# Patient Record
Sex: MALE
[De-identification: names, ages, dates, MRNs, and addresses within clinical notes are randomized per-mention and may not be internally consistent; named-entity substitution may affect disease eponyms.]

---

## 2019-09-06 ENCOUNTER — APPOINTMENT (OUTPATIENT)
Dept: UROLOGY | Facility: CLINIC | Age: 69
End: 2019-09-06
Payer: MEDICARE

## 2019-09-06 VITALS
WEIGHT: 180 LBS | HEIGHT: 67 IN | OXYGEN SATURATION: 97 % | HEART RATE: 67 BPM | DIASTOLIC BLOOD PRESSURE: 79 MMHG | BODY MASS INDEX: 28.25 KG/M2 | SYSTOLIC BLOOD PRESSURE: 112 MMHG

## 2019-09-06 DIAGNOSIS — Z87.891 PERSONAL HISTORY OF NICOTINE DEPENDENCE: ICD-10-CM

## 2019-09-06 DIAGNOSIS — Z86.39 PERSONAL HISTORY OF OTHER ENDOCRINE, NUTRITIONAL AND METABOLIC DISEASE: ICD-10-CM

## 2019-09-06 DIAGNOSIS — Z82.49 FAMILY HISTORY OF ISCHEMIC HEART DISEASE AND OTHER DISEASES OF THE CIRCULATORY SYSTEM: ICD-10-CM

## 2019-09-06 PROCEDURE — 51798 US URINE CAPACITY MEASURE: CPT

## 2019-09-06 PROCEDURE — 99203 OFFICE O/P NEW LOW 30 MIN: CPT | Mod: 25

## 2019-09-06 RX ORDER — TAMSULOSIN HYDROCHLORIDE 0.4 MG/1
0.4 CAPSULE ORAL
Refills: 0 | Status: ACTIVE | COMMUNITY

## 2019-09-06 RX ORDER — ATORVASTATIN CALCIUM 40 MG/1
40 TABLET, FILM COATED ORAL
Refills: 0 | Status: ACTIVE | COMMUNITY

## 2019-09-06 RX ORDER — MECLIZINE HYDROCHLORIDE 25 MG/1
25 TABLET ORAL
Refills: 0 | Status: ACTIVE | COMMUNITY

## 2019-09-06 NOTE — HISTORY OF PRESENT ILLNESS
[FreeTextEntry1] : 70 yo M presents with 1 month history of suprapubic pressure and discomfort with radiation down the penis\par Seems to occur only in the evening\par Voids every 2 hours during the day, nocturia every 2 hours\par no dysuria or gross hematuria\par weak stream and has been on tamsulosin for several years\par PSA 2 days ago was 1.9\par Drinks 50-60 ounces of water daily,1-2 cups of coffee,\par normal bowel movements\par no hematospermia or sexual dysfunction. Has been sexually active recently outside of his marriage. No protection

## 2019-09-06 NOTE — PHYSICAL EXAM
[General Appearance - Well Developed] : well developed [General Appearance - Well Nourished] : well nourished [Normal Appearance] : normal appearance [General Appearance - In No Acute Distress] : no acute distress [Well Groomed] : well groomed [Edema] : no peripheral edema [Respiration, Rhythm And Depth] : normal respiratory rhythm and effort [Exaggerated Use Of Accessory Muscles For Inspiration] : no accessory muscle use [Abdomen Soft] : soft [Abdomen Tenderness] : non-tender [Costovertebral Angle Tenderness] : no ~M costovertebral angle tenderness [Penis Abnormality] : normal uncircumcised penis [Urethral Meatus] : meatus normal [Urinary Bladder Findings] : the bladder was normal on palpation [Scrotum] : the scrotum was normal [Epididymis] : the epididymides were normal [Testes Tenderness] : no tenderness of the testes [Testes Mass (___cm)] : there were no testicular masses [Prostate Tenderness] : the prostate was not tender [No Prostate Nodules] : no prostate nodules [Prostate Size ___ gm] : prostate size [unfilled] gm [Normal Station and Gait] : the gait and station were normal for the patient's age [] : no rash [No Focal Deficits] : no focal deficits [Affect] : the affect was normal [Oriented To Time, Place, And Person] : oriented to person, place, and time [Mood] : the mood was normal [Not Anxious] : not anxious [No Palpable Adenopathy] : no palpable adenopathy

## 2019-09-06 NOTE — ASSESSMENT
[FreeTextEntry1] : 70 yo M with suprapubic discomfort, longstanding BPH\par \par - Discussed possible etiologies for LUTS. Discussed ways to manage them including behavioral modifications such as adequate hydration, controlling constipation, restricting fluids in the evening\par - UA, culture, GC testing\par - increase to 0.8mg tamsulosin

## 2019-09-06 NOTE — REVIEW OF SYSTEMS
[see HPI] : see HPI [both] : pain during and after intercourse [denies] : denies pain with orgasm [base] : pain in base of penis [Dizziness] : dizziness [Negative] : Endocrine

## 2019-09-12 LAB
APPEARANCE: CLEAR
BACTERIA UR CULT: NORMAL
BACTERIA: NEGATIVE
BILIRUBIN URINE: NEGATIVE
BLOOD URINE: NEGATIVE
C TRACH RRNA SPEC QL NAA+PROBE: NOT DETECTED
COLOR: YELLOW
GLUCOSE QUALITATIVE U: NEGATIVE
HYALINE CASTS: 0 /LPF
KETONES URINE: NEGATIVE
LEUKOCYTE ESTERASE URINE: NEGATIVE
MICROSCOPIC-UA: NORMAL
N GONORRHOEA RRNA SPEC QL NAA+PROBE: NOT DETECTED
NITRITE URINE: NEGATIVE
PH URINE: 5.5
PROTEIN URINE: NEGATIVE
RED BLOOD CELLS URINE: 3 /HPF
SOURCE AMPLIFICATION: NORMAL
SPECIFIC GRAVITY URINE: 1.02
SQUAMOUS EPITHELIAL CELLS: 0 /HPF
UROBILINOGEN URINE: NORMAL
WHITE BLOOD CELLS URINE: 1 /HPF

## 2019-09-13 ENCOUNTER — RESULT REVIEW (OUTPATIENT)
Age: 69
End: 2019-09-13

## 2019-09-17 ENCOUNTER — FORM ENCOUNTER (OUTPATIENT)
Age: 69
End: 2019-09-17

## 2019-09-18 ENCOUNTER — OUTPATIENT (OUTPATIENT)
Dept: OUTPATIENT SERVICES | Facility: HOSPITAL | Age: 69
LOS: 1 days | End: 2019-09-18
Payer: COMMERCIAL

## 2019-09-18 ENCOUNTER — APPOINTMENT (OUTPATIENT)
Dept: CT IMAGING | Facility: IMAGING CENTER | Age: 69
End: 2019-09-18
Payer: MEDICARE

## 2019-09-18 DIAGNOSIS — R31.29 OTHER MICROSCOPIC HEMATURIA: ICD-10-CM

## 2019-09-18 PROCEDURE — 74178 CT ABD&PLV WO CNTR FLWD CNTR: CPT | Mod: 26

## 2019-09-18 PROCEDURE — 82565 ASSAY OF CREATININE: CPT

## 2019-09-18 PROCEDURE — 74178 CT ABD&PLV WO CNTR FLWD CNTR: CPT

## 2019-09-30 ENCOUNTER — APPOINTMENT (OUTPATIENT)
Dept: UROLOGY | Facility: CLINIC | Age: 69
End: 2019-09-30
Payer: MEDICARE

## 2019-09-30 DIAGNOSIS — R31.29 OTHER MICROSCOPIC HEMATURIA: ICD-10-CM

## 2019-09-30 DIAGNOSIS — R39.9 UNSPECIFIED SYMPTOMS AND SIGNS INVOLVING THE GENITOURINARY SYSTEM: ICD-10-CM

## 2019-09-30 PROCEDURE — 52000 CYSTOURETHROSCOPY: CPT

## 2020-04-07 ENCOUNTER — RX RENEWAL (OUTPATIENT)
Age: 70
End: 2020-04-07

## 2020-04-07 RX ORDER — TAMSULOSIN HYDROCHLORIDE 0.4 MG/1
0.4 CAPSULE ORAL
Qty: 90 | Refills: 3 | Status: ACTIVE | COMMUNITY
Start: 2019-09-25 | End: 1900-01-01

## 2020-11-23 ENCOUNTER — APPOINTMENT (OUTPATIENT)
Dept: UROLOGY | Facility: CLINIC | Age: 70
End: 2020-11-23
Payer: MEDICARE

## 2020-11-23 VITALS
HEIGHT: 67 IN | DIASTOLIC BLOOD PRESSURE: 70 MMHG | RESPIRATION RATE: 15 BRPM | WEIGHT: 186 LBS | BODY MASS INDEX: 29.19 KG/M2 | TEMPERATURE: 98.3 F | SYSTOLIC BLOOD PRESSURE: 114 MMHG | HEART RATE: 74 BPM

## 2020-11-23 DIAGNOSIS — R97.20 ELEVATED PROSTATE, SPECIFIC ANTIGEN [PSA]: ICD-10-CM

## 2020-11-23 DIAGNOSIS — N13.8 BENIGN PROSTATIC HYPERPLASIA WITH LOWER URINARY TRACT SYMPMS: ICD-10-CM

## 2020-11-23 DIAGNOSIS — N40.1 BENIGN PROSTATIC HYPERPLASIA WITH LOWER URINARY TRACT SYMPMS: ICD-10-CM

## 2020-11-23 PROCEDURE — 99214 OFFICE O/P EST MOD 30 MIN: CPT

## 2020-11-23 NOTE — PHYSICAL EXAM
[General Appearance - Well Developed] : well developed [General Appearance - Well Nourished] : well nourished [Normal Appearance] : normal appearance [Well Groomed] : well groomed [General Appearance - In No Acute Distress] : no acute distress [Abdomen Soft] : soft [Abdomen Tenderness] : non-tender [Costovertebral Angle Tenderness] : no ~M costovertebral angle tenderness [Urethral Meatus] : meatus normal [Penis Abnormality] : normal uncircumcised penis [Urinary Bladder Findings] : the bladder was normal on palpation [Scrotum] : the scrotum was normal [Epididymis] : the epididymides were normal [Testes Tenderness] : no tenderness of the testes [Testes Mass (___cm)] : there were no testicular masses [Prostate Tenderness] : the prostate was not tender [No Prostate Nodules] : no prostate nodules [Prostate Size ___ gm] : prostate size [unfilled] gm [Edema] : no peripheral edema [] : no respiratory distress [Respiration, Rhythm And Depth] : normal respiratory rhythm and effort [Exaggerated Use Of Accessory Muscles For Inspiration] : no accessory muscle use [Oriented To Time, Place, And Person] : oriented to person, place, and time [Affect] : the affect was normal [Mood] : the mood was normal [Not Anxious] : not anxious [Normal Station and Gait] : the gait and station were normal for the patient's age [No Focal Deficits] : no focal deficits [No Palpable Adenopathy] : no palpable adenopathy

## 2020-11-29 PROBLEM — N40.1 BPH WITH OBSTRUCTION/LOWER URINARY TRACT SYMPTOMS: Status: ACTIVE | Noted: 2019-09-06

## 2020-11-29 NOTE — ASSESSMENT
[FreeTextEntry1] : 69 yo M with history of BPH, elevated PSA\par \par - Obtain recent PSA results from PCP\par - PSA\par - Continue tamsulosin\par - Discussed possible etiologies for elevated PSA including benign vs. malignancy\par - Discussed pros and cons of proceeding with a TRUS-PNB. Discussed risk and benefits including infection, hematochezia, hematuria, hematospermia as well as specificity and sensitivity of the biopsy.

## 2020-11-29 NOTE — ASSESSMENT
[FreeTextEntry1] : 71 yo M with history of BPH, elevated PSA\par \par - Obtain recent PSA results from PCP\par - PSA\par - Continue tamsulosin\par - Discussed possible etiologies for elevated PSA including benign vs. malignancy\par - Discussed pros and cons of proceeding with a TRUS-PNB. Discussed risk and benefits including infection, hematochezia, hematuria, hematospermia as well as specificity and sensitivity of the biopsy.

## 2020-11-29 NOTE — HISTORY OF PRESENT ILLNESS
[FreeTextEntry1] : 68 yo M presents with 1 month history of suprapubic pressure and discomfort with radiation down the penis\par Seems to occur only in the evening\par Voids every 2 hours during the day, nocturia every 2 hours\par no dysuria or gross hematuria\par weak stream and has been on tamsulosin for several years\par PSA 2 days ago was 1.9\par Drinks 50-60 ounces of water daily,1-2 cups of coffee,\par normal bowel movements\par no hematospermia or sexual dysfunction. Has been sexually active recently outside of his marriage. No protection\par \par 11/23/20 Interval history: Doing well since last visit\par Voiding every 2 hours, nocturia every 2 hours\par Drinks 3-4 L of water, 1-2 cups of coffee daily\par Pt states that he was told to follow-up for elevated PSA

## 2020-12-02 LAB — PSA SERPL-MCNC: 3.09 NG/ML

## 2022-04-27 ENCOUNTER — APPOINTMENT (OUTPATIENT)
Dept: ORTHOPEDIC SURGERY | Facility: CLINIC | Age: 72
End: 2022-04-27
Payer: MEDICARE

## 2022-04-27 VITALS — HEIGHT: 68 IN | BODY MASS INDEX: 28.34 KG/M2 | WEIGHT: 187 LBS

## 2022-04-27 DIAGNOSIS — Z96.651 PRESENCE OF RIGHT ARTIFICIAL KNEE JOINT: ICD-10-CM

## 2022-04-27 DIAGNOSIS — Z96.652 PRESENCE OF LEFT ARTIFICIAL KNEE JOINT: ICD-10-CM

## 2022-04-27 PROCEDURE — 73560 X-RAY EXAM OF KNEE 1 OR 2: CPT | Mod: LT

## 2022-04-27 PROCEDURE — 99203 OFFICE O/P NEW LOW 30 MIN: CPT

## 2022-04-27 NOTE — HISTORY OF PRESENT ILLNESS
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  Reports mild residual anterior knee numbness denies pain swelling locking giving out.  Walking independently and does play soccer

## 2022-04-27 NOTE — CONSULT LETTER
[Dear  ___] : Dear  [unfilled], [Consult Letter:] : I had the pleasure of evaluating your patient, [unfilled]. [Consult Closing:] : Thank you very much for allowing me to participate in the care of this patient.  If you have any questions, please do not hesitate to contact me. [Sincerely,] : Sincerely, [FreeTextEntry3] : Raudel Pérez MD, FAAOS\par Total Hip and Total Knee Replacement\par Anterior Total Hip Replacement\par \par \par Orthopaedic Surgery\par [facility]\par

## 2022-04-27 NOTE — PHYSICAL EXAM
[de-identified] : Constitutional:Well nourished , well developed and in no acute distress\par Psychiatric: Alert and oriented to time place and person.Appropriate affect \par Skin:Head, neck, arms and lower extremities:no lesions or discoloration\par HEENT: Normocephalic, EOM intact, Nasal septum midline,\par Respiratory: Unlabored respirations,no audible wheezing ,no tachypnea, no cyanosis\par Cardiovascular: no leg swelling  no ankle edema no JVD, pulse regular\par Vascular: no calf or thigh tenderness, \par Peripheral pulses; intact\par Lymphatics:No groin adenopathy,no lymphedema lower  or upper extremities\par Left knee healed normal alignment flexion 0/120 ligaments intact\par Right knee healed neutral alignment no effusion flexion 0/115 [de-identified] : X-rays right and left knee 3 views demonstrate satisfactory component alignment right and left total knee replacement

## 2023-04-14 ENCOUNTER — APPOINTMENT (OUTPATIENT)
Dept: UROLOGY | Facility: CLINIC | Age: 73
End: 2023-04-14

## 2023-05-03 ENCOUNTER — APPOINTMENT (OUTPATIENT)
Dept: ORTHOPEDIC SURGERY | Facility: CLINIC | Age: 73
End: 2023-05-03

## 2023-05-04 ENCOUNTER — APPOINTMENT (OUTPATIENT)
Dept: UROLOGY | Facility: CLINIC | Age: 73
End: 2023-05-04

## 2024-05-23 NOTE — HISTORY OF PRESENT ILLNESS
I reviewed the resident/fellow's documentation and discussed the patient with the resident/fellow. I agree with the resident/fellow's medical decision making as documented in the note.    Priyank Barreto MD       [FreeTextEntry1] : 70 yo M presents with 1 month history of suprapubic pressure and discomfort with radiation down the penis\par Seems to occur only in the evening\par Voids every 2 hours during the day, nocturia every 2 hours\par no dysuria or gross hematuria\par weak stream and has been on tamsulosin for several years\par PSA 2 days ago was 1.9\par Drinks 50-60 ounces of water daily,1-2 cups of coffee,\par normal bowel movements\par no hematospermia or sexual dysfunction. Has been sexually active recently outside of his marriage. No protection\par \par 11/23/20 Interval history: Doing well since last visit\par Voiding every 2 hours, nocturia every 2 hours\par Drinks 3-4 L of water, 1-2 cups of coffee daily\par Pt states that he was told to follow-up for elevated PSA

## 2024-06-21 ENCOUNTER — APPOINTMENT (OUTPATIENT)
Dept: OTOLARYNGOLOGY | Facility: CLINIC | Age: 74
End: 2024-06-21

## 2024-08-30 ENCOUNTER — NON-APPOINTMENT (OUTPATIENT)
Age: 74
End: 2024-08-30

## 2024-09-04 ENCOUNTER — RESULT REVIEW (OUTPATIENT)
Age: 74
End: 2024-09-04

## 2024-09-04 ENCOUNTER — APPOINTMENT (OUTPATIENT)
Dept: OTOLARYNGOLOGY | Facility: CLINIC | Age: 74
End: 2024-09-04
Payer: MEDICARE

## 2024-09-04 VITALS
HEIGHT: 68 IN | BODY MASS INDEX: 27.58 KG/M2 | TEMPERATURE: 97.4 F | HEART RATE: 77 BPM | DIASTOLIC BLOOD PRESSURE: 71 MMHG | OXYGEN SATURATION: 98 % | SYSTOLIC BLOOD PRESSURE: 106 MMHG | WEIGHT: 182 LBS

## 2024-09-04 DIAGNOSIS — C11.9 MALIGNANT NEOPLASM OF NASOPHARYNX, UNSPECIFIED: ICD-10-CM

## 2024-09-04 DIAGNOSIS — C77.0 SECONDARY AND UNSPECIFIED MALIGNANT NEOPLASM OF LYMPH NODES OF HEAD, FACE AND NECK: ICD-10-CM

## 2024-09-04 PROCEDURE — 99204 OFFICE O/P NEW MOD 45 MIN: CPT | Mod: 25

## 2024-09-04 PROCEDURE — 31231 NASAL ENDOSCOPY DX: CPT

## 2024-09-04 NOTE — CONSULT LETTER
[Dear  ___] : Dear  [unfilled], [Consult Letter:] : I had the pleasure of evaluating your patient, [unfilled]. [Please see my note below.] : Please see my note below. [Consult Closing:] : Thank you very much for allowing me to participate in the care of this patient.  If you have any questions, please do not hesitate to contact me. [Sincerely,] : Sincerely, [FreeTextEntry2] : STEFAN Tolliver ( [place], [place]) Adebayo Smith MD ([place], [place]) [FreeTextEntry3] : Sergio Romo MD, FACS     [facility] Associate Chair    Department of Otolaryngology  Professor Otolaryngology & Molecular Medicine [facility]

## 2024-09-04 NOTE — PROCEDURE
[Mass] : a mass [None] : none [Flexible Endoscope] : examined with the flexible endoscope [FreeTextEntry6] : there is an ulcerated and indutaed mass epicentered midline uipper NP w extension to R fossae Rosenmueller. There is no OP entension but is ? some extensikon to post aspect R inf turbinate

## 2024-09-04 NOTE — PHYSICAL EXAM
[de-identified] : AD retracted w fluid.  As generally clear [FreeTextEntry1] : there is an ulcerated and indutaed mass epicentered midline uipper NP w extension to R fossae Rosenmueller. There is no OP entension but is ? some extensikon to post aspect R inf turbinate [Midline] : trachea located in midline position [Normal] : no rashes

## 2024-09-05 ENCOUNTER — NON-APPOINTMENT (OUTPATIENT)
Age: 74
End: 2024-09-05

## 2024-09-10 ENCOUNTER — OUTPATIENT (OUTPATIENT)
Dept: OUTPATIENT SERVICES | Facility: HOSPITAL | Age: 74
LOS: 1 days | End: 2024-09-10
Payer: COMMERCIAL

## 2024-09-10 ENCOUNTER — APPOINTMENT (OUTPATIENT)
Dept: ULTRASOUND IMAGING | Facility: IMAGING CENTER | Age: 74
End: 2024-09-10
Payer: MEDICARE

## 2024-09-10 ENCOUNTER — RESULT REVIEW (OUTPATIENT)
Age: 74
End: 2024-09-10

## 2024-09-10 DIAGNOSIS — C11.9 MALIGNANT NEOPLASM OF NASOPHARYNX, UNSPECIFIED: ICD-10-CM

## 2024-09-10 DIAGNOSIS — C77.0 SECONDARY AND UNSPECIFIED MALIGNANT NEOPLASM OF LYMPH NODES OF HEAD, FACE AND NECK: ICD-10-CM

## 2024-09-10 PROCEDURE — 88364 INSITU HYBRIDIZATION (FISH): CPT | Mod: 26

## 2024-09-10 PROCEDURE — 38505 NEEDLE BIOPSY LYMPH NODES: CPT | Mod: RT

## 2024-09-10 PROCEDURE — 88360 TUMOR IMMUNOHISTOCHEM/MANUAL: CPT

## 2024-09-10 PROCEDURE — 76942 ECHO GUIDE FOR BIOPSY: CPT | Mod: 26

## 2024-09-10 PROCEDURE — 38505 NEEDLE BIOPSY LYMPH NODES: CPT

## 2024-09-10 PROCEDURE — 76942 ECHO GUIDE FOR BIOPSY: CPT

## 2024-09-10 PROCEDURE — 88342 IMHCHEM/IMCYTCHM 1ST ANTB: CPT | Mod: 26,59

## 2024-09-10 PROCEDURE — 88364 INSITU HYBRIDIZATION (FISH): CPT

## 2024-09-10 PROCEDURE — 88305 TISSUE EXAM BY PATHOLOGIST: CPT

## 2024-09-10 PROCEDURE — 88173 CYTOPATH EVAL FNA REPORT: CPT | Mod: 26

## 2024-09-10 PROCEDURE — 88341 IMHCHEM/IMCYTCHM EA ADD ANTB: CPT | Mod: 26,59

## 2024-09-10 PROCEDURE — 88305 TISSUE EXAM BY PATHOLOGIST: CPT | Mod: 26

## 2024-09-10 PROCEDURE — 88365 INSITU HYBRIDIZATION (FISH): CPT | Mod: 26,59

## 2024-09-10 PROCEDURE — 88342 IMHCHEM/IMCYTCHM 1ST ANTB: CPT

## 2024-09-10 PROCEDURE — 88365 INSITU HYBRIDIZATION (FISH): CPT

## 2024-09-10 PROCEDURE — 88173 CYTOPATH EVAL FNA REPORT: CPT

## 2024-09-10 PROCEDURE — 88360 TUMOR IMMUNOHISTOCHEM/MANUAL: CPT | Mod: 26

## 2024-09-10 PROCEDURE — 88341 IMHCHEM/IMCYTCHM EA ADD ANTB: CPT

## 2024-09-11 ENCOUNTER — OUTPATIENT (OUTPATIENT)
Dept: OUTPATIENT SERVICES | Facility: HOSPITAL | Age: 74
LOS: 1 days | End: 2024-09-11
Payer: COMMERCIAL

## 2024-09-11 ENCOUNTER — APPOINTMENT (OUTPATIENT)
Dept: NUCLEAR MEDICINE | Facility: IMAGING CENTER | Age: 74
End: 2024-09-11

## 2024-09-11 ENCOUNTER — OUTPATIENT (OUTPATIENT)
Dept: OUTPATIENT SERVICES | Facility: HOSPITAL | Age: 74
LOS: 1 days | End: 2024-09-11

## 2024-09-11 VITALS
HEART RATE: 71 BPM | HEIGHT: 66 IN | WEIGHT: 179.9 LBS | RESPIRATION RATE: 15 BRPM | TEMPERATURE: 98 F | OXYGEN SATURATION: 99 % | DIASTOLIC BLOOD PRESSURE: 82 MMHG | SYSTOLIC BLOOD PRESSURE: 121 MMHG

## 2024-09-11 DIAGNOSIS — J39.2 OTHER DISEASES OF PHARYNX: ICD-10-CM

## 2024-09-11 DIAGNOSIS — C11.9 MALIGNANT NEOPLASM OF NASOPHARYNX, UNSPECIFIED: ICD-10-CM

## 2024-09-11 DIAGNOSIS — Z96.652 PRESENCE OF LEFT ARTIFICIAL KNEE JOINT: Chronic | ICD-10-CM

## 2024-09-11 DIAGNOSIS — Z98.49 CATARACT EXTRACTION STATUS, UNSPECIFIED EYE: Chronic | ICD-10-CM

## 2024-09-11 DIAGNOSIS — Z96.651 PRESENCE OF RIGHT ARTIFICIAL KNEE JOINT: Chronic | ICD-10-CM

## 2024-09-11 DIAGNOSIS — Z98.890 OTHER SPECIFIED POSTPROCEDURAL STATES: Chronic | ICD-10-CM

## 2024-09-11 DIAGNOSIS — Z91.89 OTHER SPECIFIED PERSONAL RISK FACTORS, NOT ELSEWHERE CLASSIFIED: ICD-10-CM

## 2024-09-11 PROCEDURE — 78815 PET IMAGE W/CT SKULL-THIGH: CPT

## 2024-09-11 PROCEDURE — A9552: CPT

## 2024-09-11 PROCEDURE — 78815 PET IMAGE W/CT SKULL-THIGH: CPT | Mod: 26,PI

## 2024-09-11 NOTE — H&P PST ADULT - TEMPERATURE IN CELSIUS (DEGREES C)
Pt discharged in stable condition, discharge instructions and prescriptions given, pt verbalized understanding. Surgical site intact.pt waiting on transport. Montserrat Guidry RN     36.7

## 2024-09-11 NOTE — H&P PST ADULT - ENMT COMMENTS
FROM of neck pre op dx-  right nasopharyngeal mass and right cervical adenopathy reports of right otalgia, tinnitus, for last 2 months, right sided neck + lump for last 2 weeks-followed up with ENT- s/p cervical  node biopsy, plan for nasopharynx  biopsy

## 2024-09-11 NOTE — H&P PST ADULT - PRO ARRIVE FROM
As we spoke continue to take ibuprofen if you are headache comes back, follow-up with your primary care doctor have given you Dr. Odell Norris name and number if you do not have 1.   Return to the emergency department if you have any questions or concerns home

## 2024-09-11 NOTE — H&P PST ADULT - SPO2 (%)
99 Flap Thinning Complex Repair Preamble Text (Leave Blank If You Do Not Want): An incision was made along the previous flap suture line. Undermining was performed beneath the flap and redundant tissue was removed to restore the normal contour of the skin.

## 2024-09-11 NOTE — H&P PST ADULT - NEUROLOGICAL COMMENTS
chronic LE intermittent paresthesias for - 5 years, history of vertigo- followed with PCP was on meclizine intermittently

## 2024-09-11 NOTE — H&P PST ADULT - NECK
right lateral neck with swollen cervical lymph node/normal/supple/symmetric/no tracheal deviation/no thyromegaly/no palpable masses

## 2024-09-11 NOTE — H&P PST ADULT - PSY GEN HX ROS MEA POS PC
Initial Clinical Review    Admission: Date/Time/Statement:   Admission Orders (From admission, onward)       Ordered        04/01/24 0323  Inpatient Admission  Once                          Orders Placed This Encounter   Procedures    Inpatient Admission     Standing Status:   Standing     Number of Occurrences:   1     Order Specific Question:   Level of Care     Answer:   Med Surg [16]     Order Specific Question:   Estimated length of stay     Answer:   More than 2 Midnights     Order Specific Question:   Certification     Answer:   I certify that inpatient services are medically necessary for this patient for a duration of greater than two midnights. See H&P and MD Progress Notes for additional information about the patient's course of treatment.     ED Arrival Information       Expected   -    Arrival   3/31/2024 21:08    Acuity   Urgent              Means of arrival   Ambulance    Escorted by   [facility]    Service   Hospitalist    Admission type   Trauma Center              Arrival complaint   Fall             Chief Complaint   Patient presents with    Fall     See trauma charting       Initial Presentation: 76 y.o. male Admitted Inpatient with Acute Encephalopathy s/p Unwitnessed Fall.  Pt presented to ED by ems from IP rehab facility s/p unwitnessed fall with reported head strike. Pt is aware he fell but does not recall events. Trauma w/u in ED neg for acute injuries. WBC 15.41. On exam small lac to L forehead, confused and repetitive, frequently asking where he is and where family is.  UA with innumerable RBCs/WBC without bacteria, and has completing treatment for acute cystitis.   PMHx: T2 DM, MS, HTN, HONEY on CPAP, aneurysm of basilar artery  Plan: Admit IP d/t confusion. -- Obtain routine EEG, TSH/B12/folate. Monitor mental status closely. Neuro checks q4h. Further w/u pending these results particularly if pt's mental status should not improve. Observe off further abx currently. PT/OT evals.  Continue pt po meds.       Date: 4/2   Day 2:   Wound care note: Pressure injury right buttock, stage - Wound is oval in shape, dry and intact skin, 100% non-blanchable red intact skin. Zee-wound is dry, intact, blanchable. B/L elbows, hips, sacrum, left buttock and heels- skin is dry, intact, blanchable.   Plan: Hydraguard to b/l sacrum, buttock and heels BID and PRN. Elevate heels to offload pressure. Ehob cushion in chair when out of bed. Moisturize skin daily with skin nourishing cream.    OOB in chair. Neck pain is improved today. Acute encephalopathy as resolved. AA&Ox3. WBCs now wnl. Agreeable to return to rehab. EEG abnormal however nonspecific. No epileptic discharges. Continue supportive care for musculoskeletal neck pain. Outpatient follow-up with PCP.           ED Triage Vitals   Temperature Pulse Respirations Blood Pressure SpO2   03/31/24 2111 03/31/24 2111 03/31/24 2111 03/31/24 2111 03/31/24 2111   98.3 °F (36.8 °C) 89 18 169/80 94 %      Temp Source Heart Rate Source Patient Position - Orthostatic VS BP Location FiO2 (%)   03/31/24 2111 03/31/24 2111 03/31/24 2111 04/01/24 0609 --   Oral Monitor Lying Right arm       Pain Score       04/01/24 0900       No Pain          Wt Readings from Last 1 Encounters:   03/31/24 69.3 kg (152 lb 12.5 oz)     Additional Vital Signs:   Date/Time Temp Pulse Resp BP MAP (mmHg) SpO2 O2 Device Patient Position - Orthostatic VS   04/02/24 0730 -- -- -- -- -- -- None (Room air) --   04/02/24 07:21:44 98 °F (36.7 °C) 67 17 140/69 93 95 % None (Room air) Lying   04/01/24 2211 -- 68 -- -- -- -- -- --   04/01/24 2158 -- -- -- 130/62 -- -- -- --   04/01/24 21:56:53 98 °F (36.7 °C) -- 18 100/41 Abnormal  61 Abnormal  -- -- --   04/01/24 2012 -- -- -- -- -- -- None (Room air) --   04/01/24 16:07:46 98.2 °F (36.8 °C) -- -- 147/68 94 -- -- --   04/01/24 14:41:59 98.6 °F (37 °C) 83 16 126/54 78 96 % None (Room air) Lying   04/01/24 0915 -- 76 -- -- -- -- -- --   04/01/24 0900 --  -- -- -- -- -- None (Room air) --   04/01/24 0820 97.6 °F (36.4 °C) -- 16 137/61 86 -- -- --   04/01/24 0700 -- 60 12 156/65 93 96 % None (Room air) Lying   04/01/24 0609 -- 66 15 144/64 92 96 % None (Room air) Lying   04/01/24 0300 -- 74 16 176/95 Abnormal  -- 97 % None (Room air) --   04/01/24 0200 -- 66 12 150/62 -- 98 % None (Room air) Lying   04/01/24 0100 -- 76 15 145/68 -- 98 % None (Room air) Lying   03/31/24 2300 -- 82 20 158/74 -- 99 % None (Room air) Lying   03/31/24 2230 -- 80 19 174/77 Abnormal  -- 97 % None (Room air) Lying   03/31/24 2200 -- 78 17 168/74 -- 98 % None (Room air) Lying   03/31/24 2145 -- 82 21 164/74 -- 99 % None (Room air) Lying   03/31/24 2129 -- 87 20 158/125 Abnormal  -- 97 % None (Room air) --   03/31/24 21:15:22 -- 88 19 156/68 -- 97 % None (Room air) Lying   03/31/24 21:11:26 98.3 °F (36.8 °C) 89 18 169/80 -- 94 % None (Room air) Lying     Pertinent Labs/Diagnostic Test Results:   EEG 4/1:   Interpretation:   This is an abnormal 28 minutes awake and drowsy EEG due to excessive theta and delta activity during wakefulness and generalized intermittent rhythmic delta activity (GRDA).   These findings are etiologically nonspecific for moderate diffuse cerebral dysfunction.    MRI cervical spine wo contrast   Final Result by Rasheed Lassiter MD (04/01 0848)      Severely motion degraded, incomplete MR of the cervical spine, verging on nondiagnostic.         XR Trauma multiple (B/RA trauma bay ONLY)   Final Result by Branden Dale DO (04/01 0040)      No acute cardiopulmonary disease within limitations of supine imaging.      Other findings as above.      Correlation with pending trauma CTs recommended.         XR chest 1 view   Final Result by Branden Dale DO (04/01 1047)      No acute cardiopulmonary disease within limitations of supine imaging.      Other findings as above.      Correlation with pending trauma CTs recommended.         XR pelvis ap  only 1 or 2 vw   Final Result by Branden Dale DO (04/01 1047)      No acute cardiopulmonary disease within limitations of supine imaging.      Other findings as above.      Correlation with pending trauma CTs recommended.         CT head without contrast   Final Result by Wil Phillips MD (03/31 2232)      No acute intracranial hemorrhage or depressed calvarial fracture. Senescent changes and other ancillary findings detailed above.         CT spine cervical without contrast   Final Result by Wil Phillips MD (03/31 2238)      Multilevel degenerative changes without acute cervical spine fracture or traumatic malalignment.            Results from last 7 days   Lab Units 04/02/24  0454 04/01/24  0458 03/31/24 2120 03/31/24 2119   WBC Thousand/uL 11.15* 13.50* 15.41*  --    HEMOGLOBIN g/dL 13.3 14.8 14.6  --    I STAT HEMOGLOBIN g/dl  --   --   --  15.0   HEMATOCRIT % 41.7 45.4 44.7  --    HEMATOCRIT, ISTAT %  --   --   --  44   PLATELETS Thousands/uL 416* 394* 444*  --    NEUTROS ABS Thousands/µL 4.87  --  9.70*  --      Results from last 7 days   Lab Units 04/02/24  0454 04/01/24  0609 03/31/24 2305 03/31/24 2119   SODIUM mmol/L 140 139 136  --    POTASSIUM mmol/L 3.8 4.4 5.3  --    CHLORIDE mmol/L 104 102 102  --    CO2 mmol/L 24 27 27  --    CO2, I-STAT mmol/L  --   --   --  31   ANION GAP mmol/L 12 10 7  --    BUN mg/dL 17 12 13  --    CREATININE mg/dL 0.88 0.77 0.77  --    EGFR ml/min/1.73sq m 84 89 89  --    CALCIUM mg/dL 9.3 9.6 9.9  --    CALCIUM, IONIZED, ISTAT mmol/L  --   --   --  1.17     Results from last 7 days   Lab Units 03/31/24  2305   AST U/L 25   ALT U/L 23   ALK PHOS U/L 82   TOTAL PROTEIN g/dL 7.5   ALBUMIN g/dL 3.6   TOTAL BILIRUBIN mg/dL 0.44     Results from last 7 days   Lab Units 04/02/24  1058 04/02/24  0724 04/01/24  2051 04/01/24  1710 04/01/24  1240   POC GLUCOSE mg/dl 131 112 108 178* 115     Results from last 7 days   Lab Units 04/02/24  0454 04/01/24  0609  03/31/24  2305   GLUCOSE RANDOM mg/dL 112 111 122     Results from last 7 days   Lab Units 03/31/24  2119   PH, KENIA I-STAT  7.432*   PCO2, KENIA ISTAT mm HG 44.1   PO2, KENIA ISTAT mm HG 19.0*   HCO3, KENIA ISTAT mmol/L 29.4   I STAT BASE EXC mmol/L 4*   I STAT O2 SAT % 30*     Results from last 7 days   Lab Units 04/01/24  0458   TSH 3RD GENERATON uIU/mL 2.475     Results from last 7 days   Lab Units 04/01/24  0059   CLARITY UA  Extra Turbid   COLOR UA  Yellow   SPEC GRAV UA  1.010   PH UA  6.5   GLUCOSE UA mg/dl 1000 (1%)*   KETONES UA mg/dl Negative   BLOOD UA  Small*   PROTEIN UA mg/dl 30 (1+)*   NITRITE UA  Negative   BILIRUBIN UA  Negative   UROBILINOGEN UA (BE) mg/dl <2.0   LEUKOCYTES UA  Large*   WBC UA /hpf Innumerable*   RBC UA /hpf Innumerable*   BACTERIA UA /hpf None Seen   EPITHELIAL CELLS WET PREP /hpf None Seen   MUCUS THREADS  Occasional*       ED Treatment:   Medication Administration from 03/31/2024 2105 to 04/01/2024 0753         Date/Time Order Dose Route Action     03/31/2024 2137 EDT tetanus-diphtheria-acellular pertussis (BOOSTRIX) IM injection 0.5 mL 0.5 mL Intramuscular Given     03/31/2024 2337 EDT acetaminophen (Ofirmev) injection 1,000 mg 1,000 mg Intravenous New Bag       Past Medical History:   Diagnosis Date    Diabetes (HCC)     Hypertension     Multiple sclerosis (HCC)      Present on Admission:   Acute encephalopathy   Leukocytosis   HONEY on CPAP   Fall   Primary hypertension   Type 2 diabetes mellitus without complication, without long-term current use of insulin (HCC)   Aneurysm of basilar artery (HCC)   Multiple sclerosis (HCC)   Urinary retention      Admitting Diagnosis: Unspecified multiple injuries, initial encounter [T07.XXXA]  Age/Sex: 76 y.o. male  Admission Orders:  Scheduled Medications:  acetaminophen, 975 mg, Oral, Q8H LEE  amLODIPine, 10 mg, Oral, Daily   And  atorvastatin, 80 mg, Oral, Daily  clopidogrel, 75 mg, Oral, Daily  vitamin B-12, 500 mcg, Oral, Daily  enoxaparin,  40 mg, Subcutaneous, Daily  gabapentin, 300 mg, Oral, BID  gabapentin, 600 mg, Oral, HS  insulin lispro, 1-5 Units, Subcutaneous, TID AC  insulin lispro, 1-5 Units, Subcutaneous, HS  latanoprost, 1 drop, Both Eyes, HS  lidocaine, 1 patch, Topical, Daily  pantoprazole, 40 mg, Oral, BID  propranolol, 60 mg, Oral, Daily  sertraline, 25 mg, Oral, Daily    PRN Meds:  bisacodyl, 10 mg, Rectal, Daily PRN  ondansetron, 4 mg, Intravenous, Q6H PRN        IP CONSULT TO GERONTOLOGY    Network Utilization Review Department  ATTENTION: Please call with any questions or concerns to [phone] and carefully listen to the prompts so that you are directed to the right person. All voicemails are confidential.   For Discharge needs, contact Care Management DC Support Team at [phone] opt. [phone]  Send all requests for admission clinical reviews, approved or denied determinations and any other requests to dedicated fax number below belonging to the [place] where the patient is receiving treatment. List of dedicated fax numbers for the Facilities:  FACILITY NAME UR FAX NUMBER   ADMISSION DENIALS (Administrative/Medical Necessity) [phone]   DISCHARGE SUPPORT TEAM ([facility]) [phone]   PARENT CHILD HEALTH (Maternity/NICU/Pediatrics) [phone]   [facility] [phone]   [facility] [phone]   [facility] [phone]   [facility] [phone]   [facility] [phone]   [facility] [phone]   [facility] [phone]   [facility] [phone]   [facility] [phone]   [facility] [phone]   [facility] [phone]   [facility] [phone]          anxiety/insomnia

## 2024-09-11 NOTE — H&P PST ADULT - LYMPHATICS COMMENTS
right lateral cervical lymph node enlargement- s/p biopsy with mild ecchymotic area on right lateral neck

## 2024-09-11 NOTE — H&P PST ADULT - NSICDXFAMILYHX_GEN_ALL_CORE_FT
FAMILY HISTORY:  Aunt  Still living? Unknown  Family hx of colon cancer, Age at diagnosis: Age Unknown

## 2024-09-11 NOTE — H&P PST ADULT - NSICDXPASTSURGICALHX_GEN_ALL_CORE_FT
PAST SURGICAL HISTORY:  H/O cervical biopsy     H/O colonoscopy     History of knee replacement procedure of right knee     S/P cataract surgery     S/P total knee replacement, left

## 2024-09-11 NOTE — H&P PST ADULT - NSANTHOSAYNRD_GEN_A_CORE
No. GARCIA screening performed.  STOP BANG Legend: 0-2 = LOW Risk; 3-4 = INTERMEDIATE Risk; 5-8 = HIGH Risk

## 2024-09-11 NOTE — H&P PST ADULT - PROBLEM SELECTOR PLAN 3
Postoperative Delirium Screen    Patient eligible for flaco risk screen age>75? (if <= 75 then done) NO    Health care proxy paperwork given to patient? Yes (all patients should be given the packet to fill out at home and return on day of surgery to pre-op RN)    Impaired mobility (ie: uses cane, walker, wheelchair, or assist device)? Yes/no    Known dementia diagnosis? Yes/no    Impaired functional status (METS<4)? Yes/no    Malnutrition BMI<20? Yes/no

## 2024-09-11 NOTE — H&P PST ADULT - HISTORY OF PRESENT ILLNESS
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 About 2 months ago pt  was experiencing right ear ringing and feeling the ear clogged, antibiotics and and a nasal spray given with no improvement. s/p followed up with otolaryngologist given medication, steroids and sprays and a ct scan was ordered. Ct scan showed nasopharyngeal mass. Pt also developed lump on the  right side of neck about 2 weeks. Symptoms still the same and now he is experiencing right side otalgia and his voice sounds nasally. s/p right dised cervical lymph node biopsy -09/10/24, plan for nasopharynx biopsy. 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 Pt  was experiencing right ear ringing and feeling the ear clogged for last 2 months, s/p antibiotics  and  nasal spray given with no improvement. Pt followed up with otolaryngologist given medication, steroids and sprays and a ct scan was ordered. Ct scan showed nasopharyngeal mass. Also developed lump on the  right side of neck about 2 weeks ago. s/p right sided cervical lymph node biopsy -09/10/24, plan for nasopharynx biopsy.

## 2024-09-11 NOTE — H&P PST ADULT - PROBLEM SELECTOR PLAN 1
Patient is tentatively scheduled  for nasopharynx biopsy, nasal  endoscopy with biopsy with Dr Romo- 09/17/24.    Pre-op instructions provided. Pt given verbal and written instructions with teach back on pt own omeprazole. Pt verbalized understanding with return demonstration.    No labs warranted for minimally risk procedure

## 2024-09-11 NOTE — H&P PST ADULT - ADDITIONAL PE
denies loose teeth, patient  has upper complete dentures  visualization of only the base of uvula- mallampati class 3

## 2024-09-11 NOTE — H&P PST ADULT - BLOOD TRANSFUSION, PREVIOUS, PROFILE
Have Your Skin Lesions Been Treated?: not been treated Is This A New Presentation, Or A Follow-Up?: Skin Lesions How Severe Is Your Skin Lesion?: moderate no

## 2024-09-11 NOTE — H&P PST ADULT - PROBLEM/PLAN-3
"                  Clinical Nutrition     Patient Name: Esteban Winters  YOB: 2018  MRN: [id number]  Date of Encounter: 20 14:07  Admission date: 2018    Reason for Visit: Education on discharge feeding plan    Patient/Client History:   Hospital Problem List     , gestational age 34 completed weeks    RDS (respiratory distress syndrome in the )    LGA (large for gestational age) infant    Anthropometrics:  Birth Length: (inches)  Current Length: 20.5  Height: 49.2 cm (19.37\")      Birth OFC:   Current OFC: Head Circumference: 13.58\" (34.5 cm)  Head Circumference: 13.39\" (34 cm)      Birth Weight:                                              3100 g (6 lb 13.4 oz)  Current Weight: Weight: 3112 g (6 lb 13.8 oz)   Weight change from Birth Weight: 0%         Food and Nutrition-Related History:     Discharge diet: EBM fortified with HMF 1:25; Similac Sensitive mixed to 22 kcal/oz if no EBM    Education Assessment:    Education provided to: Mother and Father  Readiness to learn: Acceptance and Eager  Barriers to learning: No barriers identified at this time  Method of Education: Written material and Verbal instruction  Level of Understanding: Knowledge or skill consistently and independently        Met with parents to discuss discharge feeding plan.  Mom states she has been pumping but does not get as much when pumping at night. She states when she visits infant in NICU she is able to pump more during those times.  Advised mom her milk supply may increase once infant is home and around her more frequently in a less stressful environment.   We went over mixing HMF with her expressed milk at a 1:25 ratio.  Discussed use of Sim Sensitive as needed and how to mix to 22 kcal/oz RD provided HMF and formula, faxed form to have additional HMF shipped to parents address. Food safety reviewed.    Nutrition Diagnosis        Problem Food and nutrition knowledge deficit   Etiology " Discharge feeding plan   Signs/Symptoms Instruction on mixing EBM with HMF and formula use required       Intervention/Recommendations      Provided written and verbal instructions, Provided written and verbal instructions, mixing/measurement equipment  and Provided formula samples     Monitor: RD contact information provided to family and available to assist as needed.      Maria Del Carmen Parada RD,   Time Spent: 30 min         DISPLAY PLAN FREE TEXT

## 2024-09-13 ENCOUNTER — APPOINTMENT (OUTPATIENT)
Dept: OTOLARYNGOLOGY | Facility: CLINIC | Age: 74
End: 2024-09-13

## 2024-09-13 PROBLEM — M19.90 UNSPECIFIED OSTEOARTHRITIS, UNSPECIFIED SITE: Chronic | Status: ACTIVE | Noted: 2024-09-11

## 2024-09-13 PROBLEM — F41.9 ANXIETY DISORDER, UNSPECIFIED: Chronic | Status: ACTIVE | Noted: 2024-09-11

## 2024-09-13 PROBLEM — N40.0 BENIGN PROSTATIC HYPERPLASIA WITHOUT LOWER URINARY TRACT SYMPTOMS: Chronic | Status: ACTIVE | Noted: 2024-09-11

## 2024-09-15 ENCOUNTER — OUTPATIENT (OUTPATIENT)
Dept: OUTPATIENT SERVICES | Facility: HOSPITAL | Age: 74
LOS: 1 days | Discharge: HOME | End: 2024-09-15

## 2024-09-15 DIAGNOSIS — Z98.890 OTHER SPECIFIED POSTPROCEDURAL STATES: Chronic | ICD-10-CM

## 2024-09-15 DIAGNOSIS — Z96.652 PRESENCE OF LEFT ARTIFICIAL KNEE JOINT: Chronic | ICD-10-CM

## 2024-09-15 DIAGNOSIS — C11.9 MALIGNANT NEOPLASM OF NASOPHARYNX, UNSPECIFIED: ICD-10-CM

## 2024-09-15 DIAGNOSIS — Z96.651 PRESENCE OF RIGHT ARTIFICIAL KNEE JOINT: Chronic | ICD-10-CM

## 2024-09-15 DIAGNOSIS — Z98.49 CATARACT EXTRACTION STATUS, UNSPECIFIED EYE: Chronic | ICD-10-CM

## 2024-09-16 ENCOUNTER — TRANSCRIPTION ENCOUNTER (OUTPATIENT)
Age: 74
End: 2024-09-16

## 2024-09-16 NOTE — ASU PATIENT PROFILE, ADULT - BLOOD TRANSFUSION, PREVIOUS, PROFILE
Video Visit - Kem Byrd 34 y o  female MRN: [id number]    REQUIRED DOCUMENTATION:         1  This service was provided via [facility]  2  Provider located at [address] [place] [id number] [phone]  3  [employer] provider: ERIK Cervantes  4  Identify all parties in room with patient during [facility] visit:  Patient   5  After connecting through [employer], patient was identified by name and date of birth  Patient was then informed that this was a Telemedicine visit and that the exam was being conducted confidentially over secure lines  My office door was closed  No one else was in the room  Patient acknowledged consent and understanding of privacy and security of the Telemedicine visit  I informed the patient that I have reviewed their record in Epic and presented the opportunity for them to ask any questions regarding the visit today  The patient agreed to participate  This is a 34year old female here today for video visit  She states she is still having vaginally itching and pressure  She is having frequent urination  She states she had 2 virtual visit and then seen ob/gyn  She states she continues to have urgency, frequency and itchy  She was seen by OB/GYN yesterday and had testing ordering  She continues to have symptoms despite being almost done with flagyl  She has pending ultrasound  Exam from yesterday appears relatively benign  She has ultrasound scheduled for 6/12  Review of Systems   HENT: Negative  Respiratory: Negative  Cardiovascular: Negative  Gastrointestinal: Positive for abdominal pain  Genitourinary: Positive for urgency and vaginal discharge  Negative for flank pain, frequency, hematuria and vaginal bleeding  Neurological: Negative  Psychiatric/Behavioral: Negative  There were no vitals filed for this visit  Physical Exam  Constitutional:       Appearance: Normal appearance     HENT:      Head: Normocephalic and atraumatic  Skin:     Comments: No rash   Neurological:      Mental Status: She is alert and oriented to person, place, and time  Psychiatric:         Mood and Affect: Mood normal          Behavior: Behavior normal          Thought Content: Thought content normal          Judgment: Judgment normal        Diagnoses and all orders for this visit:    Suprapubic discomfort  -     phenazopyridine (PYRIDIUM) 100 mg tablet; Take 1 tablet (100 mg total) by mouth 3 (three) times a day as needed for bladder spasms for up to 3 days      Patient Instructions   At this time       Follow up with PCP if not improved, if symptoms are worse, go to the ER  no

## 2024-09-16 NOTE — ASU PATIENT PROFILE, ADULT - AS SC BRADEN NUTRITION
Encounter Date: 8/18/2017       History     Chief Complaint   Patient presents with    Extremity Weakness     Mother reports paatient having leg weakness, reported he keeps falling and cant walk X2 days     The mother reported that the child has not been using bilateral lower extremities seems to be having pain in or around his upper femur area bilaterally.  She says as long as he is sitting still he seems to be okay but the minute he tries to walk he has pain      The history is provided by the mother.   Leg Pain    The incident occurred at home. There was no injury mechanism. The incident occurred today. The pain is present in the left hip, left thigh, right hip and right thigh. The quality of the pain is described as aching. The pain is at a severity of 4/10. The pain has been constant since onset. Associated symptoms include inability to bear weight. He reports no foreign bodies present. The symptoms are aggravated by bearing weight. He has tried nothing for the symptoms.     Review of patient's allergies indicates:  No Known Allergies  Past Medical History:   Diagnosis Date    RSV (respiratory syncytial virus infection)      History reviewed. No pertinent surgical history.  Family History   Problem Relation Age of Onset    No Known Problems Mother     No Known Problems Father      Social History   Substance Use Topics    Smoking status: Never Smoker    Smokeless tobacco: Never Used    Alcohol use No     Review of Systems   Constitutional: Negative.    HENT: Negative.    Eyes: Negative.    Respiratory: Negative.    Cardiovascular: Negative.    Gastrointestinal: Negative.    Endocrine: Negative.    Genitourinary: Negative.  Negative for decreased urine volume, difficulty urinating, discharge, dysuria, enuresis, flank pain, frequency, genital sores, hematuria, penile pain, penile swelling, scrotal swelling, testicular pain and urgency.   Musculoskeletal: Positive for gait problem.   Skin: Negative.     Allergic/Immunologic: Negative.    Hematological: Negative.    Psychiatric/Behavioral: Negative.    All other systems reviewed and are negative.      Physical Exam     Initial Vitals [08/18/17 1723]   BP Pulse Resp Temp SpO2   -- (!) 66 (!) 18 99.1 °F (37.3 °C) 96 %      MAP       --         Physical Exam    Nursing note and vitals reviewed.  Constitutional: Vital signs are normal. He appears well-developed and well-nourished. He is active, easily engaged and cooperative.   HENT:   Head: Normocephalic and atraumatic.   Right Ear: Tympanic membrane normal.   Left Ear: Tympanic membrane normal.   Eyes: Lids are normal. Red reflex is present bilaterally. Visual tracking is normal.   Neck: Trachea normal, normal range of motion, full passive range of motion without pain and phonation normal. Neck supple.   Cardiovascular: Normal rate, regular rhythm, S1 normal and S2 normal. Pulses are strong and palpable.    Pulmonary/Chest: Effort normal. There is normal air entry.   Abdominal: Soft. Bowel sounds are normal. Hernia confirmed negative in the right inguinal area and confirmed negative in the left inguinal area.   Genitourinary: Testes normal and penis normal. Cremasteric reflex is present. Circumcised.   Musculoskeletal:        Right hip: He exhibits decreased range of motion and tenderness.        Legs:  Lymphadenopathy: No inguinal adenopathy noted on the right or left side.   Neurological: He is alert and oriented for age.   Skin: Skin is warm and moist.         ED Course   Procedures  Labs Reviewed - No data to display           Imaging Results          X-Ray Hips Bilateral 2 View Inc AP Pelvis (Final result)  Result time 08/18/17 18:17:32   Procedure changed from X-Ray Hip 2 View Left     Final result by Harris Saavedra MD (08/18/17 18:17:32)                 Impression:        No acute displaced fracture or dislocation identified.      Electronically signed by: HARRIS SAAVEDRA MD, MD  Date:      08/18/17  Time:    18:17              Narrative:    COMPARISON: bilateral femur series same date    FINDINGS: 2 views bilateral hip series.      Skeletally immature patient.  Bones are well mineralized. Alignment is within normal limits. No abnormal widening of the physes.  No acute displaced fracture, dislocation, or destructive osseous process identified.  The joint spaces appear relatively maintained.   No subcutaneous emphysema or radiodense retained foreign body.                             X-Ray Femur AP/LAT Left (Final result)  Result time 08/18/17 18:16:46    Final result by Harris Saavedra MD (08/18/17 18:16:46)                 Impression:        No acute displaced fracture or dislocation identified.      Electronically signed by: HARRIS SAAVEDRA MD, MD  Date:     08/18/17  Time:    18:16              Narrative:    COMPARISON: Contralateral right femur and bilateral hip series same date    FINDINGS: 2 views left femur.      Skeletally immature patient.  Bones are well mineralized. Alignment is within normal limits. No abnormal widening of the physes.  No acute displaced fracture, dislocation, or destructive osseous process identified.  The joint spaces appear relatively maintained.   No subcutaneous emphysema or radiodense retained foreign body.                             X-Ray Femur 2 AP/LAT Right (Final result)  Result time 08/18/17 18:16:28    Final result by Harris Saavedra MD (08/18/17 18:16:28)                 Impression:        No acute displaced fracture or dislocation identified.      Electronically signed by: HARRIS SAAVEDRA MD, MD  Date:     08/18/17  Time:    18:16              Narrative:    COMPARISON: Contralateral left femur and bilateral hip series same date    FINDINGS: 2 views right femur.      Skeletally immature patient.  Bones are well mineralized. Alignment is within normal limits. No abnormal widening of the physes.  No acute displaced fracture, dislocation, or destructive osseous  process identified.  The joint spaces appear relatively maintained.   No subcutaneous emphysema or radiodense retained foreign body.                              Medical Decision Making:   ED Management:  This patient presents with a gait disturbance.  Physical examinations are nonspecific but the mother is noted that the child is favoring bilateral hips.  My examination essentially revealed the child's apprehensive when I move or rotate his hips he cries.  He has no evidence of the  problem as he has no hernia and a normal cremasteric reflex with bilateral descended testes.  His suprapubic area is nontender he has no back pain.  Patient is neurological status is unremarkable.  When the patient is left sitting in alone he behaves normally but when he gets up to walk he starts to cry.  X-ray evaluation was unremarkable to include bilateral hips pelvis and bilateral femurs.  The etiology of his presenting symptomatology is unclear.  We'll discharge patient with instructions to the mother and father to give Tylenol Motrin for pain I will also write for low dose hydrocodone in case the pain is intractable.  I'm encouraged the mother to follow up with pediatrician or an orthopedic surgeon if the problem persist.  There is no sign of osteomyelitis                   ED Course     Clinical Impression:   The primary encounter diagnosis was Musculoskeletal pain of left lower extremity. Diagnoses of Pain and Musculoskeletal pain of right thigh were also pertinent to this visit.                           Shad Apodaca MD  08/18/17 [id number]     (4) excellent

## 2024-09-17 ENCOUNTER — RESULT REVIEW (OUTPATIENT)
Age: 74
End: 2024-09-17

## 2024-09-17 ENCOUNTER — OUTPATIENT (OUTPATIENT)
Dept: OUTPATIENT SERVICES | Facility: HOSPITAL | Age: 74
LOS: 1 days | Discharge: HOME | End: 2024-09-17
Payer: MEDICARE

## 2024-09-17 ENCOUNTER — TRANSCRIPTION ENCOUNTER (OUTPATIENT)
Age: 74
End: 2024-09-17

## 2024-09-17 ENCOUNTER — APPOINTMENT (OUTPATIENT)
Dept: OTOLARYNGOLOGY | Facility: HOSPITAL | Age: 74
End: 2024-09-17

## 2024-09-17 VITALS
HEART RATE: 70 BPM | RESPIRATION RATE: 15 BRPM | SYSTOLIC BLOOD PRESSURE: 141 MMHG | OXYGEN SATURATION: 98 % | DIASTOLIC BLOOD PRESSURE: 83 MMHG

## 2024-09-17 VITALS
RESPIRATION RATE: 16 BRPM | TEMPERATURE: 98 F | HEIGHT: 66 IN | HEART RATE: 66 BPM | WEIGHT: 179.9 LBS | OXYGEN SATURATION: 96 % | DIASTOLIC BLOOD PRESSURE: 83 MMHG | SYSTOLIC BLOOD PRESSURE: 124 MMHG

## 2024-09-17 DIAGNOSIS — C11.9 MALIGNANT NEOPLASM OF NASOPHARYNX, UNSPECIFIED: ICD-10-CM

## 2024-09-17 DIAGNOSIS — Z96.651 PRESENCE OF RIGHT ARTIFICIAL KNEE JOINT: Chronic | ICD-10-CM

## 2024-09-17 DIAGNOSIS — Z98.890 OTHER SPECIFIED POSTPROCEDURAL STATES: Chronic | ICD-10-CM

## 2024-09-17 DIAGNOSIS — Z96.652 PRESENCE OF LEFT ARTIFICIAL KNEE JOINT: Chronic | ICD-10-CM

## 2024-09-17 DIAGNOSIS — Z98.49 CATARACT EXTRACTION STATUS, UNSPECIFIED EYE: Chronic | ICD-10-CM

## 2024-09-17 LAB — NON-GYNECOLOGICAL CYTOLOGY STUDY: SIGNIFICANT CHANGE UP

## 2024-09-17 PROCEDURE — 88305 TISSUE EXAM BY PATHOLOGIST: CPT | Mod: 26

## 2024-09-17 PROCEDURE — 31237 NSL/SINS NDSC SURG BX POLYPC: CPT | Mod: GC,RT

## 2024-09-17 PROCEDURE — 88364 INSITU HYBRIDIZATION (FISH): CPT | Mod: 26

## 2024-09-17 PROCEDURE — 88342 IMHCHEM/IMCYTCHM 1ST ANTB: CPT | Mod: 26

## 2024-09-17 PROCEDURE — 88365 INSITU HYBRIDIZATION (FISH): CPT | Mod: 26

## 2024-09-17 RX ORDER — OXYCODONE HYDROCHLORIDE 5 MG/1
5 TABLET ORAL ONCE
Refills: 0 | Status: DISCONTINUED | OUTPATIENT
Start: 2024-09-17 | End: 2024-09-17

## 2024-09-17 RX ORDER — FENTANYL CITRATE 50 UG/ML
25 INJECTION INTRAMUSCULAR; INTRAVENOUS
Refills: 0 | Status: DISCONTINUED | OUTPATIENT
Start: 2024-09-17 | End: 2024-09-17

## 2024-09-17 RX ORDER — OXYCODONE HYDROCHLORIDE 5 MG/1
1 TABLET ORAL
Qty: 20 | Refills: 0
Start: 2024-09-17

## 2024-09-17 NOTE — ASU DISCHARGE PLAN (ADULT/PEDIATRIC) - ASU DC SPECIAL INSTRUCTIONSFT
take tylenol alternating with oxycodone (narcotic) for pain  you may resume all normal activity and home medications

## 2024-09-17 NOTE — ASU DISCHARGE PLAN (ADULT/PEDIATRIC) - NURSING INSTRUCTIONS
You received IV Tylenol for pain management at 4PM. Please DO NOT take any Tylenol (Acetaminophen) containing products, such as Vicodin, Percocet, Excedrin, and cold medications for the next 6 hours (until 10PM). DO NOT TAKE MORE THAN 3000 MG OF TYLENOL in a 24 hour period.

## 2024-09-18 ENCOUNTER — NON-APPOINTMENT (OUTPATIENT)
Age: 74
End: 2024-09-18

## 2024-09-19 ENCOUNTER — APPOINTMENT (OUTPATIENT)
Dept: RADIATION ONCOLOGY | Facility: CLINIC | Age: 74
End: 2024-09-19
Payer: MEDICARE

## 2024-09-19 ENCOUNTER — APPOINTMENT (OUTPATIENT)
Dept: OTOLARYNGOLOGY | Facility: CLINIC | Age: 74
End: 2024-09-19
Payer: MEDICARE

## 2024-09-19 VITALS
OXYGEN SATURATION: 99 % | TEMPERATURE: 97.16 F | WEIGHT: 182 LBS | RESPIRATION RATE: 16 BRPM | SYSTOLIC BLOOD PRESSURE: 133 MMHG | HEART RATE: 73 BPM | HEIGHT: 68 IN | BODY MASS INDEX: 27.58 KG/M2 | DIASTOLIC BLOOD PRESSURE: 81 MMHG

## 2024-09-19 DIAGNOSIS — H65.91 UNSPECIFIED NONSUPPURATIVE OTITIS MEDIA, RIGHT EAR: ICD-10-CM

## 2024-09-19 DIAGNOSIS — Z78.9 OTHER SPECIFIED HEALTH STATUS: ICD-10-CM

## 2024-09-19 DIAGNOSIS — G89.18 OTHER ACUTE POSTPROCEDURAL PAIN: ICD-10-CM

## 2024-09-19 DIAGNOSIS — H65.21 CHRONIC SEROUS OTITIS MEDIA, RIGHT EAR: ICD-10-CM

## 2024-09-19 DIAGNOSIS — H90.3 SENSORINEURAL HEARING LOSS, BILATERAL: ICD-10-CM

## 2024-09-19 DIAGNOSIS — H90.A11 CONDUCTIVE HEARING LOSS, UNILATERAL, RIGHT EAR WITH RESTRICTED HEARING ON THE CONTRALATERAL SIDE: ICD-10-CM

## 2024-09-19 PROCEDURE — 92504 EAR MICROSCOPY EXAMINATION: CPT

## 2024-09-19 PROCEDURE — 99213 OFFICE O/P EST LOW 20 MIN: CPT | Mod: 25

## 2024-09-19 PROCEDURE — 69433 CREATE EARDRUM OPENING: CPT | Mod: RT

## 2024-09-19 PROCEDURE — 99204 OFFICE O/P NEW MOD 45 MIN: CPT | Mod: GC

## 2024-09-19 RX ORDER — OXYCODONE 5 MG/1
5 TABLET ORAL EVERY 8 HOURS
Qty: 18 | Refills: 0 | Status: ACTIVE | COMMUNITY
Start: 2024-09-19 | End: 1900-01-01

## 2024-09-19 RX ORDER — DEXAMETHASONE 4 MG/1
4 TABLET ORAL
Qty: 25 | Refills: 0 | Status: ACTIVE | COMMUNITY
Start: 2024-09-19 | End: 1900-01-01

## 2024-09-20 PROBLEM — H90.3 BILATERAL SENSORINEURAL HEARING LOSS: Status: ACTIVE | Noted: 2024-09-20

## 2024-09-20 PROBLEM — H90.A11 CONDUCTIVE HEARING LOSS OF RIGHT EAR WITH RESTRICTED HEARING OF LEFT EAR: Status: ACTIVE | Noted: 2024-09-20

## 2024-09-20 PROBLEM — H65.21 RIGHT CHRONIC SEROUS OTITIS MEDIA: Status: ACTIVE | Noted: 2024-09-20

## 2024-09-20 PROBLEM — H65.91 MIDDLE EAR EFFUSION, RIGHT: Status: ACTIVE | Noted: 2024-09-20

## 2024-09-20 NOTE — DATA REVIEWED
[de-identified] : I have independently reviewed the patient's audiogram from dr greer's office and my findings include ashley SNHL R CHL, B franniep

## 2024-09-20 NOTE — HISTORY OF PRESENT ILLNESS
[de-identified] : 74 year old male presents for ear tube placement.  Referred by Dr. Demetrius sheldon of right nasopharyngeal mass, right sided neck lymphadenopathy Reports right ear echoing and clogging sensation for about 2 months  Denies otalgia, otorrhea, ear infections.

## 2024-09-20 NOTE — CONSULT LETTER
[Dear  ___] : Dear  [unfilled], [Consult Letter:] : I had the pleasure of evaluating your patient, [unfilled]. [Please see my note below.] : Please see my note below. [Consult Closing:] : Thank you very much for allowing me to participate in the care of this patient.  If you have any questions, please do not hesitate to contact me. [Sincerely,] : Sincerely, [FreeTextEntry2] : Ning Ruiz MD  [FreeTextEntry3] : Mauro Freire MD, Otology Neurology & Skull Base Surgery

## 2024-09-20 NOTE — PHYSICAL EXAM
[Binocular Microscopic Exam] : Binocular microscopic exam was performed [Hearing Quinonez Test (Tuning Fork On Forehead)] : no lateralization of tone [Hearing Loss Right Only] : normal [Hearing Loss Left Only] : normal [Rinne Test Air Conduction Persists > Bone Conduction Right] : bone conduction greater than air conduction on the right [Rinne Test Air Conduction Persists > Bone Conduction Left] : bone conduction greater than air conduction on the left [Nystagmus] : ~T no ~M nystagmus was seen [Fukuda Step Test] : Fukuda Step Test was Negative [Romberg's Sign] : Romberg's sign was absent [Fistula Sign] : Fistula Sign: Negative [Past-Pointing] : Past-Pointing: Negative [Sailaja-Hallmalkake] : [place]-Hallpike: Negative [de-identified] : opaque [de-identified] : brown tinged middle ear fluid [Normal] : no rashes

## 2024-09-20 NOTE — HISTORY OF PRESENT ILLNESS
[de-identified] : 74 year old male presents for ear tube placement.  Referred by Dr. Demetrius sheldon of right nasopharyngeal mass, right sided neck lymphadenopathy Reports right ear echoing and clogging sensation for about 2 months  Denies otalgia, otorrhea, ear infections.

## 2024-09-20 NOTE — PROCEDURE
[Risk and Benefits Discussed] : The purpose, risks, discomforts, benefits and alternatives of the procedure have been explained to the patient including no treatment. [Same] : same as the Pre Op Dx. [] : M & T [FreeTextEntry1] : R FREDERIC/GAEL [FreeTextEntry4] : topical phenol [FreeTextEntry6] : After application of topical phenol for anesthesia, incision was made with a myringotomy blade anteriorly in the tympanic membrane. Fluid was aspirated from the middle ear. A paparella type 1 tube was inserted atraumatically. Drops were instilled and cotton ball applied to lateral ear canal.  Patient tolerated the procedure well without complications.

## 2024-09-20 NOTE — DATA REVIEWED
[de-identified] : I have independently reviewed the patient's audiogram from dr greer's office and my findings include ashley SNHL R CHL, B franniep

## 2024-09-20 NOTE — PHYSICAL EXAM
[Binocular Microscopic Exam] : Binocular microscopic exam was performed [Hearing Quinonez Test (Tuning Fork On Forehead)] : no lateralization of tone [Hearing Loss Right Only] : normal [Hearing Loss Left Only] : normal [Rinne Test Air Conduction Persists > Bone Conduction Right] : bone conduction greater than air conduction on the right [Rinne Test Air Conduction Persists > Bone Conduction Left] : bone conduction greater than air conduction on the left [Nystagmus] : ~T no ~M nystagmus was seen [Fukuda Step Test] : Fukuda Step Test was Negative [Romberg's Sign] : Romberg's sign was absent [Fistula Sign] : Fistula Sign: Negative [Past-Pointing] : Past-Pointing: Negative [[place]-Hallmalkake] : [place]-Hallpike: Negative [de-identified] : opaque [de-identified] : brown tinged middle ear fluid [Normal] : no rashes

## 2024-09-23 ENCOUNTER — NON-APPOINTMENT (OUTPATIENT)
Age: 74
End: 2024-09-23

## 2024-09-24 ENCOUNTER — APPOINTMENT (OUTPATIENT)
Dept: HEMATOLOGY ONCOLOGY | Facility: CLINIC | Age: 74
End: 2024-09-24
Payer: MEDICARE

## 2024-09-24 ENCOUNTER — APPOINTMENT (OUTPATIENT)
Dept: OTOLARYNGOLOGY | Facility: CLINIC | Age: 74
End: 2024-09-24
Payer: MEDICARE

## 2024-09-24 ENCOUNTER — RESULT REVIEW (OUTPATIENT)
Age: 74
End: 2024-09-24

## 2024-09-24 VITALS
HEIGHT: 67 IN | SYSTOLIC BLOOD PRESSURE: 120 MMHG | OXYGEN SATURATION: 99 % | BODY MASS INDEX: 28.49 KG/M2 | DIASTOLIC BLOOD PRESSURE: 78 MMHG | TEMPERATURE: 97.2 F | RESPIRATION RATE: 16 BRPM | WEIGHT: 181.5 LBS | HEART RATE: 67 BPM

## 2024-09-24 DIAGNOSIS — Z80.0 FAMILY HISTORY OF MALIGNANT NEOPLASM OF DIGESTIVE ORGANS: ICD-10-CM

## 2024-09-24 DIAGNOSIS — Z78.9 OTHER SPECIFIED HEALTH STATUS: ICD-10-CM

## 2024-09-24 DIAGNOSIS — C77.0 SECONDARY AND UNSPECIFIED MALIGNANT NEOPLASM OF LYMPH NODES OF HEAD, FACE AND NECK: ICD-10-CM

## 2024-09-24 LAB
BASOPHILS # BLD AUTO: 0.01 K/UL — SIGNIFICANT CHANGE UP (ref 0–0.2)
BASOPHILS NFR BLD AUTO: 0.1 % — SIGNIFICANT CHANGE UP (ref 0–2)
EOSINOPHIL # BLD AUTO: 0.01 K/UL — SIGNIFICANT CHANGE UP (ref 0–0.5)
EOSINOPHIL NFR BLD AUTO: 0.1 % — SIGNIFICANT CHANGE UP (ref 0–6)
HCT VFR BLD CALC: 39 % — SIGNIFICANT CHANGE UP (ref 39–50)
HGB BLD-MCNC: 13.2 G/DL — SIGNIFICANT CHANGE UP (ref 13–17)
IMM GRANULOCYTES NFR BLD AUTO: 1 % — HIGH (ref 0–0.9)
LYMPHOCYTES # BLD AUTO: 1.75 K/UL — SIGNIFICANT CHANGE UP (ref 1–3.3)
LYMPHOCYTES # BLD AUTO: 19.1 % — SIGNIFICANT CHANGE UP (ref 13–44)
MCHC RBC-ENTMCNC: 32.4 PG — SIGNIFICANT CHANGE UP (ref 27–34)
MCHC RBC-ENTMCNC: 33.8 G/DL — SIGNIFICANT CHANGE UP (ref 32–36)
MCV RBC AUTO: 95.8 FL — SIGNIFICANT CHANGE UP (ref 80–100)
MONOCYTES # BLD AUTO: 1.12 K/UL — HIGH (ref 0–0.9)
MONOCYTES NFR BLD AUTO: 12.3 % — SIGNIFICANT CHANGE UP (ref 2–14)
NEUTROPHILS # BLD AUTO: 6.16 K/UL — SIGNIFICANT CHANGE UP (ref 1.8–7.4)
NEUTROPHILS NFR BLD AUTO: 67.4 % — SIGNIFICANT CHANGE UP (ref 43–77)
NRBC # BLD: 0 /100 WBCS — SIGNIFICANT CHANGE UP (ref 0–0)
PLATELET # BLD AUTO: 328 K/UL — SIGNIFICANT CHANGE UP (ref 150–400)
RBC # BLD: 4.07 M/UL — LOW (ref 4.2–5.8)
RBC # FLD: 13.1 % — SIGNIFICANT CHANGE UP (ref 10.3–14.5)
SURGICAL PATHOLOGY STUDY: SIGNIFICANT CHANGE UP
WBC # BLD: 9.14 K/UL — SIGNIFICANT CHANGE UP (ref 3.8–10.5)
WBC # FLD AUTO: 9.14 K/UL — SIGNIFICANT CHANGE UP (ref 3.8–10.5)

## 2024-09-24 PROCEDURE — 92610 EVALUATE SWALLOWING FUNCTION: CPT | Mod: GN

## 2024-09-24 PROCEDURE — 99205 OFFICE O/P NEW HI 60 MIN: CPT | Mod: 25

## 2024-09-24 PROCEDURE — G2212 PROLONG OUTPT/OFFICE VIS: CPT

## 2024-09-24 RX ORDER — METOCLOPRAMIDE 10 MG/1
10 TABLET ORAL
Qty: 60 | Refills: 5 | Status: ACTIVE | COMMUNITY
Start: 2024-09-24 | End: 1900-01-01

## 2024-09-24 NOTE — HISTORY OF PRESENT ILLNESS
[Disease: _____________________] : Disease: [unfilled] [T: ___] : T[unfilled] [N: ___] : N[unfilled] [AJCC Stage: ____] : AJCC Stage: [unfilled] [de-identified] : 74M with former remote smoking history developed a clogging sensation in his ear in roughly July 2024.  He saw ENT and was treated with a course of antibiotics without symptom improvement.  Patient had follow-up in August and was sent for a CT scan of his neck revealing a large nasopharyngeal mass with cervical lymph node involvement.  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  Patient was referred for nonoperative management.  PET/CT with evidence of a locally advanced disease. Patient's case was reviewed and discussed at &N tumor board on 9/24/2024 with recommendation for induction therapy followed by definitive chemo/RT.  It was noted that patient has evidence of sensorineural hearing loss however that would not preclude administration of cisplatin chemotherapy.  Patient presents today with his wife and son; his daughter showed up at the end of the visit.  Patient established care with radiation oncology.  Patient was prescribed oxycodone to use as needed for pain by radiation oncology along with a course of dexamethasone 4 mg twice daily x 2 weeks. He reports discomfort and a clogged sensation in his head.  He has right neck discomfort due to lymphadenopathy.  Reports difficulty sleeping due to his symptoms.  Denies dysphagia.  He feels that his nose is clogged.  Weight is stable. [de-identified] : - Lymph node right level 3 US guided biopsy 9/10/2024: Positive for malignant cells.  Squamous cell carcinoma. HAMILTON DILEEP is negative. PD-L1 CPS positive at 20. - Right nasopharynx biopsy 9/17/2024: Nonkeratinizing squamous cell carcinoma, p16 positive. HAMILTON DILEEP is negative. HPV E6/E7 DILEEP is positive.

## 2024-09-24 NOTE — REASON FOR VISIT
[Initial Consultation] : an initial consultation [Spouse] : spouse [Other: _____] : [unfilled] [FreeTextEntry2] : Nasopharyngeal Cancer

## 2024-09-24 NOTE — ASSESSMENT
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  Discussed and recommended induction systemic therapy given the extent of disease, followed by definitive concurrent chemo/RT. Recommend: - Administration of induction chemotherapy with cisplatin 37.5mg/m2 and gemcitabine 1000 mg/m2 IV on days 1 and 8 repeated every 3 weeks x 3 cycles.  Dosing/schedule chosen given patient's age.  Should the patient develop significant toxicity to the cisplatin such as nephrotoxicity or ototoxicity, would plan to utilize carboplatin chemotherapy. - Risks, benefits and side effects of systemic therapy were discussed with the patient who agreed to proceed and signed the consent form; drug info sheets provided. - The appropriate antiemetic and prophylactic medications were prescribed - Obtain blood work in the office today in preparation for systemic therapy - Will have the patient follow-up prior to C1 D8 of systemic therapy to assess tolerability and then on D1 of each cycle going forward, or more often depending on clinical course. - Patient will need a restaging scan at completion of induction therapy to assess response. - Following induction therapy, plan to proceed with definitive RT with administration of concurrent chemotherapy.  Would plan to administer weekly cisplatin or weekly carboplatin, depending upon tolerability/clinical course.  During concurrent chemo/RT, if the patient is receiving weekly cisplatin, patient will need IVF hydration on a separate day from chemotherapy administration where serum chemistries and renal function can be monitored. - Arranged for the start of induction chemotherapy to begin this week. All questions answered to their apparent satisfaction

## 2024-09-24 NOTE — PHYSICAL EXAM
[Fully active, able to carry on all pre-disease performance without restriction] : Status 0 - Fully active, able to carry on all pre-disease performance without restriction [Normal] : affect appropriate [de-identified] : No icterus  [de-identified] : MMM, + bulging of superior O/P  [de-identified] : Supple Bulky Right conglomerate LAD; shottly Left LAD  [de-identified] : Clear  [de-identified] : S1 S2  [de-identified] : No edema  [de-identified] : No spine/CVA tenderness  [de-identified] : Ambulatory

## 2024-09-24 NOTE — PHYSICAL EXAM
[Fully active, able to carry on all pre-disease performance without restriction] : Status 0 - Fully active, able to carry on all pre-disease performance without restriction [Normal] : affect appropriate [de-identified] : No icterus  [de-identified] : MMM, + bulging of superior O/P  [de-identified] : Supple Bulky Right conglomerate LAD; shottly Left LAD  [de-identified] : Clear  [de-identified] : S1 S2  [de-identified] : No edema  [de-identified] : No spine/CVA tenderness  [de-identified] : Ambulatory

## 2024-09-24 NOTE — HISTORY OF PRESENT ILLNESS
[Disease: _____________________] : Disease: [unfilled] [T: ___] : T[unfilled] [N: ___] : N[unfilled] [AJCC Stage: ____] : AJCC Stage: [unfilled] [de-identified] : 74M with former remote smoking history developed a clogging sensation in his ear in roughly July 2024.  He saw ENT and was treated with a course of antibiotics without symptom improvement.  Patient had follow-up in August and was sent for a CT scan of his neck revealing a large nasopharyngeal mass with cervical lymph node involvement.  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  Patient was referred for nonoperative management.  PET/CT with evidence of a locally advanced disease. Patient's case was reviewed and discussed at &N tumor board on 9/24/2024 with recommendation for induction therapy followed by definitive chemo/RT.  It was noted that patient has evidence of sensorineural hearing loss however that would not preclude administration of cisplatin chemotherapy.  Patient presents today with his wife and son; his daughter showed up at the end of the visit.  Patient established care with radiation oncology.  Patient was prescribed oxycodone to use as needed for pain by radiation oncology along with a course of dexamethasone 4 mg twice daily x 2 weeks. He reports discomfort and a clogged sensation in his head.  He has right neck discomfort due to lymphadenopathy.  Reports difficulty sleeping due to his symptoms.  Denies dysphagia.  He feels that his nose is clogged.  Weight is stable. [de-identified] : - Lymph node right level 3 US guided biopsy 9/10/2024: Positive for malignant cells.  Squamous cell carcinoma. HAMILTON DILEEP is negative. PD-L1 CPS positive at 20. - Right nasopharynx biopsy 9/17/2024: Nonkeratinizing squamous cell carcinoma, p16 positive. HAMILTON DILEEP is negative. HPV E6/E7 DILEEP is positive.

## 2024-09-24 NOTE — RESULTS/DATA
[FreeTextEntry1] : Images Reviewed/Interpreted:  - CT neck 8/27/2024: Soft tissue mass centered in the right fossa of Rosenmuller extending anteriorly and laterally into the palate teen tonsils, parapharyngeal and  space with probable involvement of the posterior palate and lateral/posterior pharyngeal wall.  There is extension posterolaterally into the carotid space to placing the internal jugular vein and internal carotid artery.  Mild/moderate stenosis of the internal carotid artery at this location.  No definite osseous or muscular invasion.  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 Multiple probably contiguous cystic lesions with irregular peripheral enhancement extends just inferior to the soft tissue lesion which extend inferiorly posterior lateral to the carotid along the jugular chain and posterior triangle to the level of the thyroid with mild mass effect and probable partial encasement of the common and internal carotid artery without significant stenosis at these locations.  Contact with multiple definite invasion of multiple cervical muscles including the SCM.  Findings most compatible with cystic necrotic adenopathy versus abscess. Right mastoid effusion and opacification of the middle ear suspicious for otomastoiditis with probable eustachian tube obstruction due to the nasopharyngeal mass. Asymmetric prominence of the lingual tonsils. Hyperdensity within the inferior maxillary sinuses bilateral compatible with chronic calcified inspissated secretions or probable benign osseous thickening. Cervical spondylosis.  -PET/CT 9/11/24:  Abnormal skull vertex-to-thigh FDG-PET/CT scan. 1. FDG-avid mass centered in right fossa of Rosenmuller corresponds to known squamous cell carcinoma. 2. Extensive FDG-avid right cervical lymphadenopathy and few FDG-avid left cervical lymph nodes are compatible with metastatic disease. 3. No evidence of distant disease.

## 2024-09-25 ENCOUNTER — NON-APPOINTMENT (OUTPATIENT)
Age: 74
End: 2024-09-25

## 2024-09-25 NOTE — VITALS
[Maximal Pain Intensity: 4/10] : 4/10 [Least Pain Intensity: 0/10] : 0/10 [Pain Description/Quality: ___] : Pain description/quality: [unfilled] [Pain Duration: ___] : Pain duration: [unfilled] [Pain Location: ___] : Pain Location: [unfilled] [OTC] : OTC [NSAID/Non-Opioid] : NSAID/Non-Opioid [90: Able to carry normal activity; minor signs or symptoms of disease.] : 90: Able to carry normal activity; minor signs or symptoms of disease.  [ECOG Performance Status: 0 - Fully active, able to carry on all pre-disease performance without restriction] : Performance Status: 0 - Fully active, able to carry on all pre-disease performance without restriction [8 - Distress Level] : Distress Level: 8 [Pain Interferes with ADLs] : Pain does not interfere with activities of daily living [FreeTextEntry7] : Does not wish to speak with  SURG

## 2024-09-25 NOTE — HISTORY OF PRESENT ILLNESS
[FreeTextEntry1] : Mr Cee is a 74 year old man, former quit in 1991 with a newly diagnosed nasopharyngeal carcinoma [id number], stage IV  Oncologic history About 2 months ago he was experiencing right ear ringing and feeling the ear clogged, antibiotics and a nasal spray given with no improvement. He waited a few weeks and then saw otolaryngologist given medication, steroids and sprays and a CT scan was ordered. Since then, about 2 weeks ago a lump appeared on the right of the cheek area. Symptoms still the same and now he is experiencing right side otalgia and his voice sounds nasally. No weight loss, eating ok. Occasionally trouble swallowing.  8/27/2024 CT STN showing soft tissues mass centered in the right fossa of Rosenmuller extending anteriorly and laterally to the palatine tonsils, parapharyngeal and  space. There is probable involvement of the posterior palate and lateral/posterior pharyngeal wall and into the carotid space. No definitive osseous/muscular invasion. There is aswoicaited cystic necrotic adenopathy on ipsilateral neck.  9/10/2024 Right level LN3 positive for squamous cell carcinoma. p40 and p16 positivee  9/11/2024  PET/CT There is a mass centered around the right fossa of Rosenmuller measuring 4.3 x 3.1 x 5.1 cm, SUV 24.1 (image 56 of dedicated head and neck series). There is an extensive FDG-avid right cervical chain lymph node conglomerate extending from the III-IV, measuring 4.2 x 2.4 x 7.0 cm, SUV 24.1(image 74). Scattered additional, FDG-avid bilateral cervical lymph and right supraclavicular nodes are compatible with metastatic disease. For reference, a right level V lymph node measures 1.1 x 0.9 cm, SUV 14.3 (image 73) and a left level V node measures 0.8 x 0.7 cm, SUV 10.9 (image 79). No evidence of distant disease.  9/3/2024 MRI STN performed, read is pending  He is scheduled to see Dr Araujo on 24th September

## 2024-09-25 NOTE — REVIEW OF SYSTEMS
[Dysphagia] : dysphagia [Negative] : Allergic/Immunologic [Loss of Hearing] : no loss of hearing [Nosebleeds] : no nosebleeds [Hoarseness] : no hoarseness [Odynophagia] : no odynophagia [Mucosal Pain] : no mucosal pain [FreeTextEntry3] : Cataracts  [FreeTextEntry4] : Occasional difficulty swallowing

## 2024-09-25 NOTE — HISTORY OF PRESENT ILLNESS
[FreeTextEntry1] : Mr Cee is a 74 year old man, former quit in 1991 with a newly diagnosed nasopharyngeal carcinoma [id number], stage IV  Oncologic history About 2 months ago he was experiencing right ear ringing and feeling the ear clogged, antibiotics and a nasal spray given with no improvement. He waited a few weeks and then saw otolaryngologist given medication, steroids and sprays and a CT scan was ordered. Since then, about 2 weeks ago a lump appeared on the right of the cheek area. Symptoms still the same and now he is experiencing right side otalgia and his voice sounds nasally. No weight loss, eating ok. Occasionally trouble swallowing.  8/27/2024 CT STN showing soft tissues mass centered in the right fossa of Rosenmuller extending anteriorly and laterally to the palatine tonsils, parapharyngeal and  space. There is probable involvement of the posterior palate and lateral/posterior pharyngeal wall and into the carotid space. No definitive osseous/muscular invasion. There is aswoicaited cystic necrotic adenopathy on ipsilateral neck.  9/10/2024 Right level LN3 positive for squamous cell carcinoma. p40 and p16 positivee  9/11/2024  PET/CT There is a mass centered around the right fossa of Rosenmuller measuring 4.3 x 3.1 x 5.1 cm, SUV 24.1 (image 56 of dedicated head and neck series). There is an extensive FDG-avid right cervical chain lymph node conglomerate extending from the III-IV, measuring 4.2 x 2.4 x 7.0 cm, SUV 24.1(image 74). Scattered additional, FDG-avid bilateral cervical lymph and right supraclavicular nodes are compatible with metastatic disease. For reference, a right level V lymph node measures 1.1 x 0.9 cm, SUV 14.3 (image 73) and a left level V node measures 0.8 x 0.7 cm, SUV 10.9 (image 79). No evidence of distant disease.  9/3/2024 MRI STN performed, read is pending  He is scheduled to see Dr Araujo on 24th September

## 2024-09-25 NOTE — HISTORY OF PRESENT ILLNESS
[FreeTextEntry1] : Mr Cee is a 74 year old man, former quit in 1991 with a newly diagnosed nasopharyngeal carcinoma [id number], stage IV  Oncologic history About 2 months ago he was experiencing right ear ringing and feeling the ear clogged, antibiotics and a nasal spray given with no improvement. He waited a few weeks and then saw otolaryngologist given medication, steroids and sprays and a CT scan was ordered. Since then, about 2 weeks ago a lump appeared on the right of the cheek area. Symptoms still the same and now he is experiencing right side otalgia and his voice sounds nasally. No weight loss, eating ok. Occasionally trouble swallowing.  8/27/2024 CT STN showing soft tissues mass centered in the right fossa of Rosenmuller extending anteriorly and laterally to the palatine tonsils, parapharyngeal and  space. There is probable involvement of the posterior palate and lateral/posterior pharyngeal wall and into the carotid space. No definitive osseous/muscular invasion. There is aswoicaited cystic necrotic adenopathy on ipsilateral neck.  9/10/2024 Right level LN3 positive for squamous cell carcinoma. p40 and p16 positivee  9/11/2024  PET/CT There is a mass centered around the right fossa of Rosenmuller measuring 4.3 x 3.1 x 5.1 cm, SUV 24.1 (image 56 of dedicated head and neck series). There is an extensive FDG-avid right cervical chain lymph node conglomerate extending from the III-IV, measuring 4.2 x 2.4 x 7.0 cm, SUV 24.1(image 74). Scattered additional, FDG-avid bilateral cervical lymph and right supraclavicular nodes are compatible with metastatic disease. For reference, a right level V lymph node measures 1.1 x 0.9 cm, SUV 14.3 (image 73) and a left level V node measures 0.8 x 0.7 cm, SUV 10.9 (image 79). No evidence of distant disease.  9/3/2024 MRI STN performed, read is pending  He is scheduled to see Dr Araujo on 24th September

## 2024-09-25 NOTE — VITALS
[Maximal Pain Intensity: 4/10] : 4/10 [Least Pain Intensity: 0/10] : 0/10 [Pain Description/Quality: ___] : Pain description/quality: [unfilled] [Pain Duration: ___] : Pain duration: [unfilled] [Pain Location: ___] : Pain Location: [unfilled] [OTC] : OTC [NSAID/Non-Opioid] : NSAID/Non-Opioid [90: Able to carry normal activity; minor signs or symptoms of disease.] : 90: Able to carry normal activity; minor signs or symptoms of disease.  [ECOG Performance Status: 0 - Fully active, able to carry on all pre-disease performance without restriction] : Performance Status: 0 - Fully active, able to carry on all pre-disease performance without restriction [8 - Distress Level] : Distress Level: 8 [Pain Interferes with ADLs] : Pain does not interfere with activities of daily living [FreeTextEntry7] : Does not wish to speak with

## 2024-09-26 ENCOUNTER — NON-APPOINTMENT (OUTPATIENT)
Age: 74
End: 2024-09-26

## 2024-09-26 ENCOUNTER — APPOINTMENT (OUTPATIENT)
Dept: INFUSION THERAPY | Facility: HOSPITAL | Age: 74
End: 2024-09-26

## 2024-09-26 LAB
ALBUMIN SERPL ELPH-MCNC: 4.1 G/DL
ALP BLD-CCNC: 68 U/L
ALT SERPL-CCNC: 17 U/L
ANION GAP SERPL CALC-SCNC: 13 MMOL/L
AST SERPL-CCNC: 14 U/L
BILIRUB SERPL-MCNC: 0.4 MG/DL
BUN SERPL-MCNC: 24 MG/DL
CALCIUM SERPL-MCNC: 9.8 MG/DL
CHLORIDE SERPL-SCNC: 100 MMOL/L
CO2 SERPL-SCNC: 25 MMOL/L
CREAT SERPL-MCNC: 1.06 MG/DL
EGFR: 75 ML/MIN/1.73M2
GLUCOSE SERPL-MCNC: 96 MG/DL
HBV CORE IGG+IGM SER QL: NONREACTIVE
HBV SURFACE AB SER QL: NONREACTIVE
HBV SURFACE AG SER QL: NONREACTIVE
HCV AB SER QL: NONREACTIVE
HCV S/CO RATIO: 0.11 S/CO
LDH SERPL-CCNC: 181 U/L
MAGNESIUM SERPL-MCNC: 2 MG/DL
POTASSIUM SERPL-SCNC: 4.2 MMOL/L
PROT SERPL-MCNC: 6.3 G/DL
SODIUM SERPL-SCNC: 138 MMOL/L
TSH SERPL-ACNC: 3 UIU/ML

## 2024-09-26 RX ORDER — FINASTERIDE 1 MG/1
1 TABLET, FILM COATED ORAL
Refills: 0 | DISCHARGE

## 2024-09-26 RX ORDER — OMEPRAZOLE 40 MG/1
1 CAPSULE, DELAYED RELEASE ORAL
Refills: 0 | DISCHARGE

## 2024-09-26 RX ORDER — ALPRAZOLAM 0.25 MG
1 TABLET ORAL
Refills: 0 | DISCHARGE

## 2024-09-26 RX ORDER — ACETAMINOPHEN 325 MG/1
0 TABLET ORAL
Refills: 0 | DISCHARGE

## 2024-09-26 RX ORDER — TAMSULOSIN HYDROCHLORIDE 0.4 MG/1
1 CAPSULE ORAL
Refills: 0 | DISCHARGE

## 2024-09-26 RX ORDER — IBUPROFEN 600 MG
0 TABLET ORAL
Refills: 0 | DISCHARGE

## 2024-09-27 DIAGNOSIS — Z51.11 ENCOUNTER FOR ANTINEOPLASTIC CHEMOTHERAPY: ICD-10-CM

## 2024-09-27 DIAGNOSIS — R11.2 NAUSEA WITH VOMITING, UNSPECIFIED: ICD-10-CM

## 2024-09-30 ENCOUNTER — NON-APPOINTMENT (OUTPATIENT)
Age: 74
End: 2024-09-30

## 2024-10-01 ENCOUNTER — APPOINTMENT (OUTPATIENT)
Dept: OTOLARYNGOLOGY | Facility: CLINIC | Age: 74
End: 2024-10-01

## 2024-10-04 ENCOUNTER — RESULT REVIEW (OUTPATIENT)
Age: 74
End: 2024-10-04

## 2024-10-04 ENCOUNTER — APPOINTMENT (OUTPATIENT)
Dept: HEMATOLOGY ONCOLOGY | Facility: CLINIC | Age: 74
End: 2024-10-04
Payer: MEDICARE

## 2024-10-04 ENCOUNTER — NON-APPOINTMENT (OUTPATIENT)
Age: 74
End: 2024-10-04

## 2024-10-04 ENCOUNTER — APPOINTMENT (OUTPATIENT)
Dept: INFUSION THERAPY | Facility: HOSPITAL | Age: 74
End: 2024-10-04

## 2024-10-04 LAB
ANION GAP SERPL CALC-SCNC: 11 MMOL/L — SIGNIFICANT CHANGE UP (ref 5–17)
BASOPHILS # BLD AUTO: 0 K/UL — SIGNIFICANT CHANGE UP (ref 0–0.2)
BASOPHILS NFR BLD AUTO: 0 % — SIGNIFICANT CHANGE UP (ref 0–2)
BUN SERPL-MCNC: 24 MG/DL — HIGH (ref 7–23)
CALCIUM SERPL-MCNC: 9.6 MG/DL — SIGNIFICANT CHANGE UP (ref 8.4–10.5)
CHLORIDE SERPL-SCNC: 99 MMOL/L — SIGNIFICANT CHANGE UP (ref 96–108)
CO2 SERPL-SCNC: 25 MMOL/L — SIGNIFICANT CHANGE UP (ref 22–31)
CREAT SERPL-MCNC: 1.05 MG/DL — SIGNIFICANT CHANGE UP (ref 0.5–1.3)
EGFR: 75 ML/MIN/1.73M2 — SIGNIFICANT CHANGE UP
EOSINOPHIL # BLD AUTO: 0.01 K/UL — SIGNIFICANT CHANGE UP (ref 0–0.5)
EOSINOPHIL NFR BLD AUTO: 0.1 % — SIGNIFICANT CHANGE UP (ref 0–6)
GLUCOSE SERPL-MCNC: 97 MG/DL — SIGNIFICANT CHANGE UP (ref 70–99)
HCT VFR BLD CALC: 36.1 % — LOW (ref 39–50)
HGB BLD-MCNC: 12.2 G/DL — LOW (ref 13–17)
IMM GRANULOCYTES NFR BLD AUTO: 0.5 % — SIGNIFICANT CHANGE UP (ref 0–0.9)
LYMPHOCYTES # BLD AUTO: 1.8 K/UL — SIGNIFICANT CHANGE UP (ref 1–3.3)
LYMPHOCYTES # BLD AUTO: 21.3 % — SIGNIFICANT CHANGE UP (ref 13–44)
MAGNESIUM SERPL-MCNC: 2 MG/DL — SIGNIFICANT CHANGE UP (ref 1.6–2.6)
MCHC RBC-ENTMCNC: 32.4 PG — SIGNIFICANT CHANGE UP (ref 27–34)
MCHC RBC-ENTMCNC: 33.8 G/DL — SIGNIFICANT CHANGE UP (ref 32–36)
MCV RBC AUTO: 95.8 FL — SIGNIFICANT CHANGE UP (ref 80–100)
MONOCYTES # BLD AUTO: 0.64 K/UL — SIGNIFICANT CHANGE UP (ref 0–0.9)
MONOCYTES NFR BLD AUTO: 7.6 % — SIGNIFICANT CHANGE UP (ref 2–14)
NEUTROPHILS # BLD AUTO: 5.98 K/UL — SIGNIFICANT CHANGE UP (ref 1.8–7.4)
NEUTROPHILS NFR BLD AUTO: 70.5 % — SIGNIFICANT CHANGE UP (ref 43–77)
NRBC # BLD: 0 /100 WBCS — SIGNIFICANT CHANGE UP (ref 0–0)
PLATELET # BLD AUTO: 173 K/UL — SIGNIFICANT CHANGE UP (ref 150–400)
POTASSIUM SERPL-MCNC: 3.9 MMOL/L — SIGNIFICANT CHANGE UP (ref 3.5–5.3)
POTASSIUM SERPL-SCNC: 3.9 MMOL/L — SIGNIFICANT CHANGE UP (ref 3.5–5.3)
RBC # BLD: 3.77 M/UL — LOW (ref 4.2–5.8)
RBC # FLD: 13.1 % — SIGNIFICANT CHANGE UP (ref 10.3–14.5)
SODIUM SERPL-SCNC: 135 MMOL/L — SIGNIFICANT CHANGE UP (ref 135–145)
WBC # BLD: 8.47 K/UL — SIGNIFICANT CHANGE UP (ref 3.8–10.5)
WBC # FLD AUTO: 8.47 K/UL — SIGNIFICANT CHANGE UP (ref 3.8–10.5)

## 2024-10-04 PROCEDURE — 99214 OFFICE O/P EST MOD 30 MIN: CPT

## 2024-10-04 NOTE — ASSESSMENT
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  Discussed and recommended induction systemic therapy given the extent of disease, followed by definitive concurrent chemo/RT. Recommend: - Continue induction chemotherapy with cisplatin 37.5mg/m2 and gemcitabine 1000 mg/m2 IV on days 1 and 8 repeated every 3 weeks x 3 cycles. Currently C1D8 -Monitor blood work -Pt completing course of steroid rx'd by Dr. Carter for pain. Pain is much improved.  - Patient will need a restaging scan at completion of induction therapy to assess response. - Following induction therapy, plan to proceed with definitive RT with administration of concurrent chemotherapy.  Would plan to administer weekly cisplatin or weekly carboplatin, depending upon tolerability/clinical course.  During concurrent chemo/RT, if the patient is receiving weekly cisplatin, patient will need IVF hydration on a separate day from chemotherapy administration where serum chemistries and renal function can be monitored. -Continue follow up D8 of each induction cycle to assess tolerability.

## 2024-10-04 NOTE — HISTORY OF PRESENT ILLNESS
[Disease: _____________________] : Disease: [unfilled] [T: ___] : T[unfilled] [N: ___] : N[unfilled] [AJCC Stage: ____] : AJCC Stage: [unfilled] [de-identified] : 74M with former remote smoking history developed a clogging sensation in his ear in roughly July 2024.  He saw ENT and was treated with a course of antibiotics without symptom improvement.  Patient had follow-up in August and was sent for a CT scan of his neck revealing a large nasopharyngeal mass with cervical lymph node involvement.  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  Patient was referred for nonoperative management.  PET/CT with evidence of a locally advanced disease. Patient's case was reviewed and discussed at [facility] tumor board on 9/24/2024 with recommendation for induction therapy followed by definitive chemo/RT.  It was noted that patient has evidence of sensorineural hearing loss however that would not preclude administration of cisplatin chemotherapy.  Patient presents today with his wife and son; his daughter showed up at the end of the visit.  Patient established care with radiation oncology.  Patient was prescribed oxycodone to use as needed for pain by radiation oncology along with a course of dexamethasone 4 mg twice daily x 2 weeks. He reports discomfort and a clogged sensation in his head.  He has right neck discomfort due to lymphadenopathy.  Reports difficulty sleeping due to his symptoms.  Denies dysphagia.  He feels that his nose is clogged.  Weight is stable. [de-identified] : - Lymph node right level 3 US guided biopsy 9/10/2024: Positive for malignant cells.  Squamous cell carcinoma. HAMILTON DILEEP is negative. PD-L1 CPS positive at 20. - Right nasopharynx biopsy 9/17/2024: Nonkeratinizing squamous cell carcinoma, p16 positive. HAMILTON DILEEP is negative. HPV E6/E7 DILEEP is positive. [de-identified] : Since initial consultation, pt started treatment with induction chemotherapy with cisplatin 37.5mg/m2 and gemcitabine 1000 mg/m2 IV on days 1 and 8 repeated every 3 weeks x 3 cycles. He is being evaluated in the treatment room prior to C1D8. He tolerated W1 wo AE. Pt endorses mild fatigue Pain/discomfort improved.  Completing course of dexamethasone per Dr. Carter's recommendation.  CCRT being planned following induction therapy.  No new complaints

## 2024-10-04 NOTE — PHYSICAL EXAM
[Fully active, able to carry on all pre-disease performance without restriction] : Status 0 - Fully active, able to carry on all pre-disease performance without restriction [Normal] : affect appropriate [de-identified] : No icterus  [de-identified] : MMM, + bulging of superior O/P  [de-identified] : Supple Bulky Right conglomerate LAD; shottly Left LAD  [de-identified] : Clear  [de-identified] : S1 S2  [de-identified] : No edema  [de-identified] : No spine/CVA tenderness  [de-identified] : Ambulatory

## 2024-10-05 LAB
ALBUMIN SERPL ELPH-MCNC: 3.6 G/DL — SIGNIFICANT CHANGE UP (ref 3.3–5)
ALP SERPL-CCNC: 60 U/L — SIGNIFICANT CHANGE UP (ref 40–120)
ALT FLD-CCNC: 22 U/L — SIGNIFICANT CHANGE UP (ref 10–45)
AST SERPL-CCNC: 18 U/L — SIGNIFICANT CHANGE UP (ref 10–40)
BILIRUB DIRECT SERPL-MCNC: 0.1 MG/DL — SIGNIFICANT CHANGE UP (ref 0–0.3)
BILIRUB INDIRECT FLD-MCNC: 0.1 MG/DL — LOW (ref 0.2–1.2)
BILIRUB SERPL-MCNC: 0.2 MG/DL — SIGNIFICANT CHANGE UP (ref 0.2–1.2)
PROT SERPL-MCNC: 6.3 G/DL — SIGNIFICANT CHANGE UP (ref 6–8.3)

## 2024-10-08 ENCOUNTER — APPOINTMENT (OUTPATIENT)
Dept: OTOLARYNGOLOGY | Facility: CLINIC | Age: 74
End: 2024-10-08
Payer: MEDICARE

## 2024-10-08 VITALS
HEIGHT: 67 IN | WEIGHT: 173 LBS | OXYGEN SATURATION: 99 % | HEART RATE: 99 BPM | SYSTOLIC BLOOD PRESSURE: 126 MMHG | BODY MASS INDEX: 27.15 KG/M2 | DIASTOLIC BLOOD PRESSURE: 82 MMHG

## 2024-10-08 VITALS — BODY MASS INDEX: 28.49 KG/M2 | WEIGHT: 181.5 LBS | HEIGHT: 67 IN

## 2024-10-08 DIAGNOSIS — H90.A11 CONDUCTIVE HEARING LOSS, UNILATERAL, RIGHT EAR WITH RESTRICTED HEARING ON THE CONTRALATERAL SIDE: ICD-10-CM

## 2024-10-08 DIAGNOSIS — H65.21 CHRONIC SEROUS OTITIS MEDIA, RIGHT EAR: ICD-10-CM

## 2024-10-08 DIAGNOSIS — H65.91 UNSPECIFIED NONSUPPURATIVE OTITIS MEDIA, RIGHT EAR: ICD-10-CM

## 2024-10-08 PROCEDURE — 92557 COMPREHENSIVE HEARING TEST: CPT

## 2024-10-08 PROCEDURE — 92504 EAR MICROSCOPY EXAMINATION: CPT

## 2024-10-08 PROCEDURE — 99214 OFFICE O/P EST MOD 30 MIN: CPT | Mod: 25

## 2024-10-08 PROCEDURE — 99214 OFFICE O/P EST MOD 30 MIN: CPT

## 2024-10-08 PROCEDURE — 92567 TYMPANOMETRY: CPT

## 2024-10-09 ENCOUNTER — OUTPATIENT (OUTPATIENT)
Dept: OUTPATIENT SERVICES | Facility: HOSPITAL | Age: 74
LOS: 1 days | Discharge: HOME | End: 2024-10-09

## 2024-10-09 DIAGNOSIS — Z98.890 OTHER SPECIFIED POSTPROCEDURAL STATES: Chronic | ICD-10-CM

## 2024-10-09 DIAGNOSIS — Z96.651 PRESENCE OF RIGHT ARTIFICIAL KNEE JOINT: Chronic | ICD-10-CM

## 2024-10-09 DIAGNOSIS — Z98.49 CATARACT EXTRACTION STATUS, UNSPECIFIED EYE: Chronic | ICD-10-CM

## 2024-10-09 DIAGNOSIS — Z96.652 PRESENCE OF LEFT ARTIFICIAL KNEE JOINT: Chronic | ICD-10-CM

## 2024-10-18 ENCOUNTER — RESULT REVIEW (OUTPATIENT)
Age: 74
End: 2024-10-18

## 2024-10-18 ENCOUNTER — APPOINTMENT (OUTPATIENT)
Dept: INFUSION THERAPY | Facility: HOSPITAL | Age: 74
End: 2024-10-18

## 2024-10-18 ENCOUNTER — NON-APPOINTMENT (OUTPATIENT)
Age: 74
End: 2024-10-18

## 2024-10-18 ENCOUNTER — APPOINTMENT (OUTPATIENT)
Dept: HEMATOLOGY ONCOLOGY | Facility: CLINIC | Age: 74
End: 2024-10-18

## 2024-10-18 ENCOUNTER — APPOINTMENT (OUTPATIENT)
Dept: HEMATOLOGY ONCOLOGY | Facility: CLINIC | Age: 74
End: 2024-10-18
Payer: MEDICARE

## 2024-10-18 VITALS
SYSTOLIC BLOOD PRESSURE: 122 MMHG | DIASTOLIC BLOOD PRESSURE: 81 MMHG | TEMPERATURE: 97.3 F | OXYGEN SATURATION: 97 % | WEIGHT: 181.66 LBS | HEART RATE: 85 BPM | BODY MASS INDEX: 28.45 KG/M2 | RESPIRATION RATE: 16 BRPM

## 2024-10-18 DIAGNOSIS — C11.9 MALIGNANT NEOPLASM OF NASOPHARYNX, UNSPECIFIED: ICD-10-CM

## 2024-10-18 DIAGNOSIS — E86.0 DEHYDRATION: ICD-10-CM

## 2024-10-18 LAB
BASOPHILS # BLD AUTO: 0.03 K/UL — SIGNIFICANT CHANGE UP (ref 0–0.2)
BASOPHILS NFR BLD AUTO: 0.7 % — SIGNIFICANT CHANGE UP (ref 0–2)
EOSINOPHIL # BLD AUTO: 0.3 K/UL — SIGNIFICANT CHANGE UP (ref 0–0.5)
EOSINOPHIL NFR BLD AUTO: 6.8 % — HIGH (ref 0–6)
HCT VFR BLD CALC: 36 % — LOW (ref 39–50)
HGB BLD-MCNC: 12.1 G/DL — LOW (ref 13–17)
IMM GRANULOCYTES NFR BLD AUTO: 1.4 % — HIGH (ref 0–0.9)
LYMPHOCYTES # BLD AUTO: 1.42 K/UL — SIGNIFICANT CHANGE UP (ref 1–3.3)
LYMPHOCYTES # BLD AUTO: 32.2 % — SIGNIFICANT CHANGE UP (ref 13–44)
MAGNESIUM SERPL-MCNC: 1.8 MG/DL — SIGNIFICANT CHANGE UP (ref 1.6–2.6)
MCHC RBC-ENTMCNC: 32.4 PG — SIGNIFICANT CHANGE UP (ref 27–34)
MCHC RBC-ENTMCNC: 33.6 G/DL — SIGNIFICANT CHANGE UP (ref 32–36)
MCV RBC AUTO: 96.3 FL — SIGNIFICANT CHANGE UP (ref 80–100)
MONOCYTES # BLD AUTO: 0.63 K/UL — SIGNIFICANT CHANGE UP (ref 0–0.9)
MONOCYTES NFR BLD AUTO: 14.3 % — HIGH (ref 2–14)
NEUTROPHILS # BLD AUTO: 1.97 K/UL — SIGNIFICANT CHANGE UP (ref 1.8–7.4)
NEUTROPHILS NFR BLD AUTO: 44.6 % — SIGNIFICANT CHANGE UP (ref 43–77)
NRBC # BLD: 0 /100 WBCS — SIGNIFICANT CHANGE UP (ref 0–0)
PLATELET # BLD AUTO: 312 K/UL — SIGNIFICANT CHANGE UP (ref 150–400)
RBC # BLD: 3.74 M/UL — LOW (ref 4.2–5.8)
RBC # FLD: 13.3 % — SIGNIFICANT CHANGE UP (ref 10.3–14.5)
WBC # BLD: 4.41 K/UL — SIGNIFICANT CHANGE UP (ref 3.8–10.5)
WBC # FLD AUTO: 4.41 K/UL — SIGNIFICANT CHANGE UP (ref 3.8–10.5)

## 2024-10-18 PROCEDURE — 99214 OFFICE O/P EST MOD 30 MIN: CPT

## 2024-10-19 LAB
ALBUMIN SERPL ELPH-MCNC: 3.9 G/DL — SIGNIFICANT CHANGE UP (ref 3.3–5)
ALP SERPL-CCNC: 73 U/L — SIGNIFICANT CHANGE UP (ref 40–120)
ALT FLD-CCNC: 15 U/L — SIGNIFICANT CHANGE UP (ref 10–45)
AST SERPL-CCNC: 15 U/L — SIGNIFICANT CHANGE UP (ref 10–40)
BILIRUB DIRECT SERPL-MCNC: 0.1 MG/DL — SIGNIFICANT CHANGE UP (ref 0–0.3)
BILIRUB INDIRECT FLD-MCNC: 0.1 MG/DL — LOW (ref 0.2–1.2)
BILIRUB SERPL-MCNC: 0.2 MG/DL — SIGNIFICANT CHANGE UP (ref 0.2–1.2)
PROT SERPL-MCNC: 6 G/DL — SIGNIFICANT CHANGE UP (ref 6–8.3)

## 2024-10-22 ENCOUNTER — NON-APPOINTMENT (OUTPATIENT)
Age: 74
End: 2024-10-22

## 2024-10-25 ENCOUNTER — RESULT REVIEW (OUTPATIENT)
Age: 74
End: 2024-10-25

## 2024-10-25 ENCOUNTER — APPOINTMENT (OUTPATIENT)
Dept: HEMATOLOGY ONCOLOGY | Facility: CLINIC | Age: 74
End: 2024-10-25
Payer: MEDICARE

## 2024-10-25 ENCOUNTER — NON-APPOINTMENT (OUTPATIENT)
Age: 74
End: 2024-10-25

## 2024-10-25 ENCOUNTER — LABORATORY RESULT (OUTPATIENT)
Age: 74
End: 2024-10-25

## 2024-10-25 ENCOUNTER — APPOINTMENT (OUTPATIENT)
Dept: INFUSION THERAPY | Facility: HOSPITAL | Age: 74
End: 2024-10-25

## 2024-10-25 DIAGNOSIS — C11.9 MALIGNANT NEOPLASM OF NASOPHARYNX, UNSPECIFIED: ICD-10-CM

## 2024-10-25 DIAGNOSIS — C77.0 SECONDARY AND UNSPECIFIED MALIGNANT NEOPLASM OF LYMPH NODES OF HEAD, FACE AND NECK: ICD-10-CM

## 2024-10-25 LAB
ALBUMIN SERPL ELPH-MCNC: 3.7 G/DL — SIGNIFICANT CHANGE UP (ref 3.3–5)
ALP SERPL-CCNC: 62 U/L — SIGNIFICANT CHANGE UP (ref 40–120)
ALT FLD-CCNC: 10 U/L — SIGNIFICANT CHANGE UP (ref 10–45)
ANION GAP SERPL CALC-SCNC: 11 MMOL/L — SIGNIFICANT CHANGE UP (ref 5–17)
AST SERPL-CCNC: 18 U/L — SIGNIFICANT CHANGE UP (ref 10–40)
BASOPHILS # BLD AUTO: 0.05 K/UL — SIGNIFICANT CHANGE UP (ref 0–0.2)
BASOPHILS NFR BLD AUTO: 1.3 % — SIGNIFICANT CHANGE UP (ref 0–2)
BILIRUB DIRECT SERPL-MCNC: 0.1 MG/DL — SIGNIFICANT CHANGE UP (ref 0–0.3)
BILIRUB INDIRECT FLD-MCNC: SIGNIFICANT CHANGE UP MG/DL (ref 0.2–1.2)
BILIRUB SERPL-MCNC: <0.2 MG/DL — SIGNIFICANT CHANGE UP (ref 0.2–1.2)
BUN SERPL-MCNC: 20 MG/DL — SIGNIFICANT CHANGE UP (ref 7–23)
CALCIUM SERPL-MCNC: 9.5 MG/DL — SIGNIFICANT CHANGE UP (ref 8.4–10.5)
CHLORIDE SERPL-SCNC: 102 MMOL/L — SIGNIFICANT CHANGE UP (ref 96–108)
CO2 SERPL-SCNC: 24 MMOL/L — SIGNIFICANT CHANGE UP (ref 22–31)
CREAT SERPL-MCNC: 1.08 MG/DL — SIGNIFICANT CHANGE UP (ref 0.5–1.3)
EGFR: 73 ML/MIN/1.73M2 — SIGNIFICANT CHANGE UP
EOSINOPHIL # BLD AUTO: 0.24 K/UL — SIGNIFICANT CHANGE UP (ref 0–0.5)
EOSINOPHIL NFR BLD AUTO: 6.3 % — HIGH (ref 0–6)
GLUCOSE SERPL-MCNC: 106 MG/DL — HIGH (ref 70–99)
HCT VFR BLD CALC: 30.9 % — LOW (ref 39–50)
HGB BLD-MCNC: 10.6 G/DL — LOW (ref 13–17)
IMM GRANULOCYTES NFR BLD AUTO: 4.2 % — HIGH (ref 0–0.9)
LYMPHOCYTES # BLD AUTO: 1.61 K/UL — SIGNIFICANT CHANGE UP (ref 1–3.3)
LYMPHOCYTES # BLD AUTO: 42.6 % — SIGNIFICANT CHANGE UP (ref 13–44)
MAGNESIUM SERPL-MCNC: 1.9 MG/DL — SIGNIFICANT CHANGE UP (ref 1.6–2.6)
MCHC RBC-ENTMCNC: 32.3 PG — SIGNIFICANT CHANGE UP (ref 27–34)
MCHC RBC-ENTMCNC: 34.3 G/DL — SIGNIFICANT CHANGE UP (ref 32–36)
MCV RBC AUTO: 94.2 FL — SIGNIFICANT CHANGE UP (ref 80–100)
MONOCYTES # BLD AUTO: 0.42 K/UL — SIGNIFICANT CHANGE UP (ref 0–0.9)
MONOCYTES NFR BLD AUTO: 11.1 % — SIGNIFICANT CHANGE UP (ref 2–14)
NEUTROPHILS # BLD AUTO: 1.3 K/UL — LOW (ref 1.8–7.4)
NEUTROPHILS NFR BLD AUTO: 34.5 % — LOW (ref 43–77)
NRBC # BLD: 0 /100 WBCS — SIGNIFICANT CHANGE UP (ref 0–0)
PLATELET # BLD AUTO: 320 K/UL — SIGNIFICANT CHANGE UP (ref 150–400)
POTASSIUM SERPL-MCNC: 4.1 MMOL/L — SIGNIFICANT CHANGE UP (ref 3.5–5.3)
POTASSIUM SERPL-SCNC: 4.1 MMOL/L — SIGNIFICANT CHANGE UP (ref 3.5–5.3)
PROT SERPL-MCNC: 6.4 G/DL — SIGNIFICANT CHANGE UP (ref 6–8.3)
RBC # BLD: 3.28 M/UL — LOW (ref 4.2–5.8)
RBC # FLD: 13 % — SIGNIFICANT CHANGE UP (ref 10.3–14.5)
SODIUM SERPL-SCNC: 137 MMOL/L — SIGNIFICANT CHANGE UP (ref 135–145)
WBC # BLD: 3.78 K/UL — LOW (ref 3.8–10.5)
WBC # FLD AUTO: 3.78 K/UL — LOW (ref 3.8–10.5)

## 2024-10-25 PROCEDURE — G2211 COMPLEX E/M VISIT ADD ON: CPT

## 2024-10-25 PROCEDURE — 99214 OFFICE O/P EST MOD 30 MIN: CPT

## 2024-11-06 ENCOUNTER — APPOINTMENT (OUTPATIENT)
Dept: OTOLARYNGOLOGY | Facility: CLINIC | Age: 74
End: 2024-11-06
Payer: MEDICARE

## 2024-11-06 VITALS
OXYGEN SATURATION: 96 % | HEART RATE: 92 BPM | WEIGHT: 176 LBS | HEIGHT: 67 IN | DIASTOLIC BLOOD PRESSURE: 78 MMHG | SYSTOLIC BLOOD PRESSURE: 124 MMHG | BODY MASS INDEX: 27.62 KG/M2

## 2024-11-06 DIAGNOSIS — C77.0 SECONDARY AND UNSPECIFIED MALIGNANT NEOPLASM OF LYMPH NODES OF HEAD, FACE AND NECK: ICD-10-CM

## 2024-11-06 PROCEDURE — 31231 NASAL ENDOSCOPY DX: CPT

## 2024-11-06 PROCEDURE — 99214 OFFICE O/P EST MOD 30 MIN: CPT | Mod: 25

## 2024-11-08 ENCOUNTER — RESULT REVIEW (OUTPATIENT)
Age: 74
End: 2024-11-08

## 2024-11-08 ENCOUNTER — APPOINTMENT (OUTPATIENT)
Dept: HEMATOLOGY ONCOLOGY | Facility: CLINIC | Age: 74
End: 2024-11-08
Payer: MEDICARE

## 2024-11-08 ENCOUNTER — APPOINTMENT (OUTPATIENT)
Dept: INFUSION THERAPY | Facility: HOSPITAL | Age: 74
End: 2024-11-08

## 2024-11-08 ENCOUNTER — APPOINTMENT (OUTPATIENT)
Dept: HEMATOLOGY ONCOLOGY | Facility: CLINIC | Age: 74
End: 2024-11-08

## 2024-11-08 DIAGNOSIS — C11.9 MALIGNANT NEOPLASM OF NASOPHARYNX, UNSPECIFIED: ICD-10-CM

## 2024-11-08 DIAGNOSIS — C77.0 SECONDARY AND UNSPECIFIED MALIGNANT NEOPLASM OF LYMPH NODES OF HEAD, FACE AND NECK: ICD-10-CM

## 2024-11-08 LAB
ALBUMIN SERPL ELPH-MCNC: 3.6 G/DL — SIGNIFICANT CHANGE UP (ref 3.3–5)
ALP SERPL-CCNC: 63 U/L — SIGNIFICANT CHANGE UP (ref 40–120)
ALT FLD-CCNC: 12 U/L — SIGNIFICANT CHANGE UP (ref 10–45)
AST SERPL-CCNC: 15 U/L — SIGNIFICANT CHANGE UP (ref 10–40)
BASOPHILS # BLD AUTO: 0.03 K/UL — SIGNIFICANT CHANGE UP (ref 0–0.2)
BASOPHILS NFR BLD AUTO: 0.8 % — SIGNIFICANT CHANGE UP (ref 0–2)
BILIRUB DIRECT SERPL-MCNC: 0.1 MG/DL — SIGNIFICANT CHANGE UP (ref 0–0.3)
BILIRUB INDIRECT FLD-MCNC: 0.1 MG/DL — LOW (ref 0.2–1.2)
BILIRUB SERPL-MCNC: 0.2 MG/DL — SIGNIFICANT CHANGE UP (ref 0.2–1.2)
EOSINOPHIL # BLD AUTO: 0.07 K/UL — SIGNIFICANT CHANGE UP (ref 0–0.5)
EOSINOPHIL NFR BLD AUTO: 2 % — SIGNIFICANT CHANGE UP (ref 0–6)
FERRITIN SERPL-MCNC: 386 NG/ML — SIGNIFICANT CHANGE UP (ref 30–400)
FOLATE SERPL-MCNC: 9.8 NG/ML — SIGNIFICANT CHANGE UP
HCT VFR BLD CALC: 31.4 % — LOW (ref 39–50)
HGB BLD-MCNC: 10.7 G/DL — LOW (ref 13–17)
IMM GRANULOCYTES NFR BLD AUTO: 2.2 % — HIGH (ref 0–0.9)
IRON SATN MFR SERPL: 27 % — SIGNIFICANT CHANGE UP (ref 16–55)
IRON SATN MFR SERPL: 68 UG/DL — SIGNIFICANT CHANGE UP (ref 45–165)
LYMPHOCYTES # BLD AUTO: 1.39 K/UL — SIGNIFICANT CHANGE UP (ref 1–3.3)
LYMPHOCYTES # BLD AUTO: 39 % — SIGNIFICANT CHANGE UP (ref 13–44)
MAGNESIUM SERPL-MCNC: 1.8 MG/DL — SIGNIFICANT CHANGE UP (ref 1.6–2.6)
MCHC RBC-ENTMCNC: 32.6 PG — SIGNIFICANT CHANGE UP (ref 27–34)
MCHC RBC-ENTMCNC: 34.1 G/DL — SIGNIFICANT CHANGE UP (ref 32–36)
MCV RBC AUTO: 95.7 FL — SIGNIFICANT CHANGE UP (ref 80–100)
MONOCYTES # BLD AUTO: 0.68 K/UL — SIGNIFICANT CHANGE UP (ref 0–0.9)
MONOCYTES NFR BLD AUTO: 19.1 % — HIGH (ref 2–14)
NEUTROPHILS # BLD AUTO: 1.31 K/UL — LOW (ref 1.8–7.4)
NEUTROPHILS NFR BLD AUTO: 36.9 % — LOW (ref 43–77)
NRBC # BLD: 0 /100 WBCS — SIGNIFICANT CHANGE UP (ref 0–0)
PLATELET # BLD AUTO: 220 K/UL — SIGNIFICANT CHANGE UP (ref 150–400)
PROT SERPL-MCNC: 6 G/DL — SIGNIFICANT CHANGE UP (ref 6–8.3)
RBC # BLD: 3.21 M/UL — LOW (ref 4.2–5.8)
RBC # BLD: 3.28 M/UL — LOW (ref 4.2–5.8)
RBC # FLD: 14.2 % — SIGNIFICANT CHANGE UP (ref 10.3–14.5)
RETICS #: 133.5 K/UL — HIGH (ref 25–125)
RETICS/RBC NFR: 4.2 % — HIGH (ref 0.5–2.5)
TIBC SERPL-MCNC: 253 UG/DL — SIGNIFICANT CHANGE UP (ref 220–430)
UIBC SERPL-MCNC: 185 UG/DL — SIGNIFICANT CHANGE UP (ref 110–370)
VIT B12 SERPL-MCNC: >2000 PG/ML — HIGH (ref 232–1245)
WBC # BLD: 3.56 K/UL — LOW (ref 3.8–10.5)
WBC # FLD AUTO: 3.56 K/UL — LOW (ref 3.8–10.5)

## 2024-11-08 PROCEDURE — G2211 COMPLEX E/M VISIT ADD ON: CPT

## 2024-11-08 PROCEDURE — 99214 OFFICE O/P EST MOD 30 MIN: CPT

## 2024-11-11 ENCOUNTER — OUTPATIENT (OUTPATIENT)
Dept: OUTPATIENT SERVICES | Facility: HOSPITAL | Age: 74
LOS: 1 days | Discharge: HOME | End: 2024-11-11

## 2024-11-11 ENCOUNTER — NON-APPOINTMENT (OUTPATIENT)
Age: 74
End: 2024-11-11

## 2024-11-11 DIAGNOSIS — C11.9 MALIGNANT NEOPLASM OF NASOPHARYNX, UNSPECIFIED: ICD-10-CM

## 2024-11-11 DIAGNOSIS — Z98.890 OTHER SPECIFIED POSTPROCEDURAL STATES: Chronic | ICD-10-CM

## 2024-11-11 DIAGNOSIS — Z96.651 PRESENCE OF RIGHT ARTIFICIAL KNEE JOINT: Chronic | ICD-10-CM

## 2024-11-11 DIAGNOSIS — Z98.49 CATARACT EXTRACTION STATUS, UNSPECIFIED EYE: Chronic | ICD-10-CM

## 2024-11-15 ENCOUNTER — APPOINTMENT (OUTPATIENT)
Dept: INFUSION THERAPY | Facility: HOSPITAL | Age: 74
End: 2024-11-15

## 2024-11-15 ENCOUNTER — APPOINTMENT (OUTPATIENT)
Dept: HEMATOLOGY ONCOLOGY | Facility: CLINIC | Age: 74
End: 2024-11-15

## 2024-11-15 ENCOUNTER — RESULT REVIEW (OUTPATIENT)
Age: 74
End: 2024-11-15

## 2024-11-15 ENCOUNTER — NON-APPOINTMENT (OUTPATIENT)
Age: 74
End: 2024-11-15

## 2024-11-15 LAB
ANION GAP SERPL CALC-SCNC: 11 MMOL/L — SIGNIFICANT CHANGE UP (ref 5–17)
BASOPHILS # BLD AUTO: 0.02 K/UL — SIGNIFICANT CHANGE UP (ref 0–0.2)
BASOPHILS NFR BLD AUTO: 1.2 % — SIGNIFICANT CHANGE UP (ref 0–2)
BUN SERPL-MCNC: 20 MG/DL — SIGNIFICANT CHANGE UP (ref 7–23)
CALCIUM SERPL-MCNC: 10.2 MG/DL — SIGNIFICANT CHANGE UP (ref 8.4–10.5)
CHLORIDE SERPL-SCNC: 105 MMOL/L — SIGNIFICANT CHANGE UP (ref 96–108)
CO2 SERPL-SCNC: 26 MMOL/L — SIGNIFICANT CHANGE UP (ref 22–31)
CREAT SERPL-MCNC: 1.13 MG/DL — SIGNIFICANT CHANGE UP (ref 0.5–1.3)
EGFR: 69 ML/MIN/1.73M2 — SIGNIFICANT CHANGE UP
EOSINOPHIL # BLD AUTO: 0.01 K/UL — SIGNIFICANT CHANGE UP (ref 0–0.5)
EOSINOPHIL NFR BLD AUTO: 0.6 % — SIGNIFICANT CHANGE UP (ref 0–6)
GLUCOSE SERPL-MCNC: 92 MG/DL — SIGNIFICANT CHANGE UP (ref 70–99)
HCT VFR BLD CALC: 27.5 % — LOW (ref 39–50)
HGB BLD-MCNC: 9.5 G/DL — LOW (ref 13–17)
IMM GRANULOCYTES NFR BLD AUTO: 1.2 % — HIGH (ref 0–0.9)
LYMPHOCYTES # BLD AUTO: 1.01 K/UL — SIGNIFICANT CHANGE UP (ref 1–3.3)
LYMPHOCYTES # BLD AUTO: 58.7 % — HIGH (ref 13–44)
MAGNESIUM SERPL-MCNC: 1.7 MG/DL — SIGNIFICANT CHANGE UP (ref 1.6–2.6)
MCHC RBC-ENTMCNC: 33.5 PG — SIGNIFICANT CHANGE UP (ref 27–34)
MCHC RBC-ENTMCNC: 34.5 G/DL — SIGNIFICANT CHANGE UP (ref 32–36)
MCV RBC AUTO: 96.8 FL — SIGNIFICANT CHANGE UP (ref 80–100)
MONOCYTES # BLD AUTO: 0.14 K/UL — SIGNIFICANT CHANGE UP (ref 0–0.9)
MONOCYTES NFR BLD AUTO: 8.1 % — SIGNIFICANT CHANGE UP (ref 2–14)
NEUTROPHILS # BLD AUTO: 0.52 K/UL — LOW (ref 1.8–7.4)
NEUTROPHILS NFR BLD AUTO: 30.2 % — LOW (ref 43–77)
NRBC # BLD: 0 /100 WBCS — SIGNIFICANT CHANGE UP (ref 0–0)
PLATELET # BLD AUTO: 215 K/UL — SIGNIFICANT CHANGE UP (ref 150–400)
POTASSIUM SERPL-MCNC: 4 MMOL/L — SIGNIFICANT CHANGE UP (ref 3.5–5.3)
POTASSIUM SERPL-SCNC: 4 MMOL/L — SIGNIFICANT CHANGE UP (ref 3.5–5.3)
RBC # BLD: 2.84 M/UL — LOW (ref 4.2–5.8)
RBC # FLD: 14.2 % — SIGNIFICANT CHANGE UP (ref 10.3–14.5)
SODIUM SERPL-SCNC: 141 MMOL/L — SIGNIFICANT CHANGE UP (ref 135–145)
WBC # BLD: 1.72 K/UL — LOW (ref 3.8–10.5)
WBC # FLD AUTO: 1.72 K/UL — LOW (ref 3.8–10.5)

## 2024-11-18 DIAGNOSIS — E86.0 DEHYDRATION: ICD-10-CM

## 2024-11-19 ENCOUNTER — NON-APPOINTMENT (OUTPATIENT)
Age: 74
End: 2024-11-19

## 2024-11-22 ENCOUNTER — APPOINTMENT (OUTPATIENT)
Dept: INFUSION THERAPY | Facility: HOSPITAL | Age: 74
End: 2024-11-22

## 2024-11-22 ENCOUNTER — RESULT REVIEW (OUTPATIENT)
Age: 74
End: 2024-11-22

## 2024-11-22 ENCOUNTER — APPOINTMENT (OUTPATIENT)
Dept: HEMATOLOGY ONCOLOGY | Facility: CLINIC | Age: 74
End: 2024-11-22

## 2024-11-22 VITALS
DIASTOLIC BLOOD PRESSURE: 77 MMHG | SYSTOLIC BLOOD PRESSURE: 116 MMHG | WEIGHT: 174.37 LBS | OXYGEN SATURATION: 96 % | TEMPERATURE: 97.5 F | BODY MASS INDEX: 27.31 KG/M2 | HEART RATE: 110 BPM | RESPIRATION RATE: 16 BRPM

## 2024-11-22 DIAGNOSIS — C77.0 SECONDARY AND UNSPECIFIED MALIGNANT NEOPLASM OF LYMPH NODES OF HEAD, FACE AND NECK: ICD-10-CM

## 2024-11-22 DIAGNOSIS — C11.9 MALIGNANT NEOPLASM OF NASOPHARYNX, UNSPECIFIED: ICD-10-CM

## 2024-11-22 LAB
BASOPHILS # BLD AUTO: 0.02 K/UL — SIGNIFICANT CHANGE UP (ref 0–0.2)
BASOPHILS NFR BLD AUTO: 0.5 % — SIGNIFICANT CHANGE UP (ref 0–2)
EOSINOPHIL # BLD AUTO: 0.02 K/UL — SIGNIFICANT CHANGE UP (ref 0–0.5)
EOSINOPHIL NFR BLD AUTO: 0.5 % — SIGNIFICANT CHANGE UP (ref 0–6)
HCT VFR BLD CALC: 32.6 % — LOW (ref 39–50)
HGB BLD-MCNC: 10.9 G/DL — LOW (ref 13–17)
IMM GRANULOCYTES NFR BLD AUTO: 0.2 % — SIGNIFICANT CHANGE UP (ref 0–0.9)
LYMPHOCYTES # BLD AUTO: 1.44 K/UL — SIGNIFICANT CHANGE UP (ref 1–3.3)
LYMPHOCYTES # BLD AUTO: 35.9 % — SIGNIFICANT CHANGE UP (ref 13–44)
MCHC RBC-ENTMCNC: 32.4 PG — SIGNIFICANT CHANGE UP (ref 27–34)
MCHC RBC-ENTMCNC: 33.4 G/DL — SIGNIFICANT CHANGE UP (ref 32–36)
MCV RBC AUTO: 97 FL — SIGNIFICANT CHANGE UP (ref 80–100)
MONOCYTES # BLD AUTO: 0.8 K/UL — SIGNIFICANT CHANGE UP (ref 0–0.9)
MONOCYTES NFR BLD AUTO: 20 % — HIGH (ref 2–14)
NEUTROPHILS # BLD AUTO: 1.72 K/UL — LOW (ref 1.8–7.4)
NEUTROPHILS NFR BLD AUTO: 42.9 % — LOW (ref 43–77)
NRBC # BLD: 0 /100 WBCS — SIGNIFICANT CHANGE UP (ref 0–0)
PLATELET # BLD AUTO: 182 K/UL — SIGNIFICANT CHANGE UP (ref 150–400)
RBC # BLD: 3.36 M/UL — LOW (ref 4.2–5.8)
RBC # FLD: 15.1 % — HIGH (ref 10.3–14.5)
WBC # BLD: 4.01 K/UL — SIGNIFICANT CHANGE UP (ref 3.8–10.5)
WBC # FLD AUTO: 4.01 K/UL — SIGNIFICANT CHANGE UP (ref 3.8–10.5)

## 2024-11-22 PROCEDURE — G2211 COMPLEX E/M VISIT ADD ON: CPT

## 2024-11-22 PROCEDURE — 99214 OFFICE O/P EST MOD 30 MIN: CPT

## 2024-11-22 RX ORDER — OMEPRAZOLE 40 MG/1
40 CAPSULE, DELAYED RELEASE ORAL
Refills: 0 | Status: ACTIVE | COMMUNITY

## 2024-11-22 RX ORDER — FINASTERIDE 5 MG/1
5 TABLET, FILM COATED ORAL
Refills: 0 | Status: ACTIVE | COMMUNITY

## 2024-11-24 DIAGNOSIS — C11.9 MALIGNANT NEOPLASM OF NASOPHARYNX, UNSPECIFIED: ICD-10-CM

## 2024-11-25 ENCOUNTER — NON-APPOINTMENT (OUTPATIENT)
Age: 74
End: 2024-11-25

## 2024-11-25 DIAGNOSIS — Z51.11 ENCOUNTER FOR ANTINEOPLASTIC CHEMOTHERAPY: ICD-10-CM

## 2024-11-25 DIAGNOSIS — R11.2 NAUSEA WITH VOMITING, UNSPECIFIED: ICD-10-CM

## 2024-11-25 DIAGNOSIS — R52 PAIN, UNSPECIFIED: ICD-10-CM

## 2024-11-25 PROCEDURE — 77263 THER RADIOLOGY TX PLNG CPLX: CPT

## 2024-11-25 PROCEDURE — 77334 RADIATION TREATMENT AID(S): CPT | Mod: 26

## 2024-11-25 RX ORDER — TRAMADOL HYDROCHLORIDE 50 MG/1
50 TABLET, COATED ORAL TWICE DAILY
Qty: 10 | Refills: 0 | Status: ACTIVE | COMMUNITY
Start: 2024-11-25 | End: 1900-01-01

## 2024-11-26 ENCOUNTER — NON-APPOINTMENT (OUTPATIENT)
Age: 74
End: 2024-11-26

## 2024-11-27 ENCOUNTER — NON-APPOINTMENT (OUTPATIENT)
Age: 74
End: 2024-11-27

## 2024-11-27 DIAGNOSIS — C11.9 MALIGNANT NEOPLASM OF NASOPHARYNX, UNSPECIFIED: ICD-10-CM

## 2024-11-29 ENCOUNTER — EMERGENCY (EMERGENCY)
Facility: HOSPITAL | Age: 74
LOS: 1 days | Discharge: HOME | End: 2024-11-29
Attending: STUDENT IN AN ORGANIZED HEALTH CARE EDUCATION/TRAINING PROGRAM | Admitting: STUDENT IN AN ORGANIZED HEALTH CARE EDUCATION/TRAINING PROGRAM
Payer: MEDICARE

## 2024-11-29 VITALS
SYSTOLIC BLOOD PRESSURE: 124 MMHG | HEART RATE: 112 BPM | DIASTOLIC BLOOD PRESSURE: 83 MMHG | OXYGEN SATURATION: 98 % | TEMPERATURE: 98 F | RESPIRATION RATE: 16 BRPM

## 2024-11-29 VITALS
HEART RATE: 122 BPM | HEIGHT: 66.93 IN | DIASTOLIC BLOOD PRESSURE: 86 MMHG | RESPIRATION RATE: 16 BRPM | OXYGEN SATURATION: 99 % | WEIGHT: 173.06 LBS | SYSTOLIC BLOOD PRESSURE: 146 MMHG

## 2024-11-29 DIAGNOSIS — Z98.49 CATARACT EXTRACTION STATUS, UNSPECIFIED EYE: Chronic | ICD-10-CM

## 2024-11-29 DIAGNOSIS — Z96.651 PRESENCE OF RIGHT ARTIFICIAL KNEE JOINT: Chronic | ICD-10-CM

## 2024-11-29 DIAGNOSIS — Z98.890 OTHER SPECIFIED POSTPROCEDURAL STATES: Chronic | ICD-10-CM

## 2024-11-29 DIAGNOSIS — Z96.652 PRESENCE OF LEFT ARTIFICIAL KNEE JOINT: Chronic | ICD-10-CM

## 2024-11-29 PROCEDURE — 93010 ELECTROCARDIOGRAM REPORT: CPT

## 2024-11-29 PROCEDURE — 99284 EMERGENCY DEPT VISIT MOD MDM: CPT

## 2024-11-29 RX ORDER — DIPHENHYDRAMINE HCL 25 MG
25 CAPSULE ORAL ONCE
Refills: 0 | Status: DISCONTINUED | OUTPATIENT
Start: 2024-11-29 | End: 2024-11-29

## 2024-11-29 RX ORDER — DIPHENHYDRAMINE HCL 25 MG
50 CAPSULE ORAL ONCE
Refills: 0 | Status: COMPLETED | OUTPATIENT
Start: 2024-11-29 | End: 2024-11-29

## 2024-11-29 RX ADMIN — Medication 50 MILLIGRAM(S): at 20:58

## 2024-11-29 NOTE — ED PROVIDER NOTE - NSICDXPASTMEDICALHX_GEN_ALL_CORE_FT
PAST MEDICAL HISTORY:  Anxiety     BPH with elevated PSA     Cancer of sinus     Hyperlipidemia     Osteoarthritis

## 2024-11-29 NOTE — ED PROVIDER NOTE - CLINICAL SUMMARY MEDICAL DECISION MAKING FREE TEXT BOX
74M, PMHx BPH, HLD, osteoarthritis, sinus cancer recently completing chemotherapy, pending radiation presents to ED for allergic reaction.  Patient states that approximately 2 hours prior to presentation, he had shrimp cocktail and salmon.  Following ingestion, patient developed diffuse urticaria and pruritic rash.  Patient states that rash is largely resolved at this time, although some patchy areas remain.  Denies any airway swelling, shortness of breath beyond baseline.  States that he had a similar episode approximately 1 month ago, not related to any known food.  Patient states that he has never had issues with salmon, shrimp prior.  Patient recently completed chemotherapy on Friday, is currently pending radiation as stated above.  No chemotherapy reactions throughout treatment course.  Denies fever, cough, chills, chest pain, N/V/D/C, urinary/bowel symptoms, trauma.    Vitals:  /83  Resp 16 unlabored    Temp 97.6 oral  SpO2 98% room air    Patient well-appearing.  Patchy urticaria noted, no desquamation, no palmar erythema, no plantar erythema.  No oral lesions.  Rash largely resolving, patient never had any airway involvement.  Low concern for chemotherapeutic 74M, PMHx BPH, HLD, osteoarthritis, sinus cancer recently completing chemotherapy, pending radiation presents to ED for allergic reaction.  Patient states that approximately 2 hours prior to presentation, he had shrimp cocktail and salmon.  Following ingestion, patient developed diffuse urticaria and pruritic rash.  Patient states that rash is largely resolved at this time, although some patchy areas remain.  Denies any airway swelling, shortness of breath beyond baseline.  States that he had a similar episode approximately 1 month ago, not related to any known food.  Patient states that he has never had issues with salmon, shrimp prior.  Patient recently completed chemotherapy on Friday, is currently pending radiation as stated above.  No chemotherapy reactions throughout treatment course.  Denies fever, cough, chills, chest pain, N/V/D/C, urinary/bowel symptoms, trauma.    Vitals:  /83  Resp 16 unlabored    Temp 97.6 oral  SpO2 98% room air    Patient well-appearing.  Patchy urticaria noted, no desquamation, no palmar erythema, no plantar erythema.  No oral lesions.  Rash largely resolving, patient never had any airway involvement.  Low concern for chemotherapeutic reaction given previous tolerance, absence of pain, disclamation, fever, other red flag signs.  Likely allergic reaction of unknown etiology.  Given significant resolution without intervention, will provide p.o. Benadryl.  Reassessment.  Discharge.

## 2024-11-29 NOTE — ED ADULT NURSE NOTE - NSFALLHARMRISKINTERV_ED_ALL_ED

## 2024-11-29 NOTE — ED PROVIDER NOTE - NSFOLLOWUPINSTRUCTIONS_ED_ALL_ED_FT
You were seen in the emergency department today for hives and itching.  We do not believe that this was related to your chemotherapy.  Rather, we believe this is a simple allergic reaction.  It mostly subsided prior to being evaluated.  We gave you Benadryl in the emergency department.  This should work over the next several hours.    If you continue to have this rash and it is unchanged, you can continue to take Benadryl.  Please take it as indicated on the bottle.    Please return to the emergency department if you get worsening rash, if your skin begins to blister, if you get redness and pain anywhere, if you develop fevers, chills, chest pain, shortness of breath, or any other symptoms you find concerning.    Please follow-up with your physician as an outpatient for further management.

## 2024-11-29 NOTE — ED ADULT TRIAGE NOTE - CHIEF COMPLAINT QUOTE
c/o allergic reaction after eating salmon at 1700 PM. also reports ate shrimp. NKDA. hives to b/l arms/legs. pt completed chemotherapy on Friday for nasal CA. starts radiation this week. O2 >95% room air, breathing unlabored. unable to obtain temp in triage. Hx BPH, anxiety

## 2024-11-29 NOTE — ED PROVIDER NOTE - PHYSICAL EXAMINATION
Gen: AAOx3, non-toxic  HEENT: NCAT. PEERLA, EOMI, oral mucosa moist, normal conjunctiva  Lung: CTAB, no respiratory distress, no wheezes/rhonchi/rales B/L, speaking in full sentences  CV: RRR, no murmurs, rubs or gallops  Abd: soft, NTND, no guarding, no CVA tenderness  MSK: no visible deformities  Neuro: No focal sensory or motor deficits  Skin: Warm, well perfused, patchy urticaria, no airway swelling, no angioedema   Psych: normal affect.

## 2024-11-29 NOTE — ED PROVIDER NOTE - INTERNATIONAL TRAVEL
[address] [facility] [address] [facility] 
[phone] Patient: Angeles Martinez MRN:  [id number] Visit Information Date & Time Provider Department Dept. Phone Encounter #  
 7/26/2018  8:00 AM Judit Garcia MD [facility] Internal Medicine [phone] [id number] Follow-up Instructions Return if symptoms worsen or fail to improve before scheduled appointment. Your Appointments 9/18/2018 10:35 AM  
ROUTINE CARE with Naresh Segura MD  
[facility] [employer] Internal Medicine [facility]) Appt Note: f/u 6m  
 [address] [facility] [address]  
[facility] [address] [address] [place] [address] [place]  
  
    
 11/13/2018 10:20 AM  
ESTABLISHED PATIENT with Kaye Caballero MD  
[facility] ([facility]) Appt Note: 3 to 4 month f/u  
 [address] [facility]  
[address] [address] Upcoming Health Maintenance Date Due  
 EYE EXAM RETINAL OR DILATED Q1 2/9/2018 FOOT EXAM Q1 4/25/2018 MEDICARE YEARLY EXAM 8/1/2018 Influenza Age 5 to Adult 8/1/2018 HEMOGLOBIN A1C Q6M 9/8/2018 GLAUCOMA SCREENING Q2Y 2/9/2019 BREAST CANCER SCRN MAMMOGRAM 2/28/2019 LIPID PANEL Q1 3/8/2019 MICROALBUMIN Q1 7/17/2019 DTaP/Tdap/Td series (2 - Td) 8/17/2023 COLONOSCOPY 6/27/2026 Allergies as of 7/26/2018  Review Complete On: 7/26/2018 By: Cristopher Downing LPN Severity Noted Reaction Type Reactions Adhesive  09/08/2009    Itching Hydrocodone  07/19/2010    Itching Lorazepam  04/02/2015    Other (comments) Other Medication  09/08/2009    Rash Tide detergent Percocet [Oxycodone-acetaminophen]  09/08/2009    Itching Sudafed [Pseudoephedrine Hcl]  10/17/2010   Side Effect Other (comments) Vertigo Wellbutrin [Bupropion Hcl]  08/31/2011    Nausea and Vomiting Zithromax [Azithromycin]  06/22/2011    Vertigo Zyrtec [Cetirizine]  10/14/2009    Nausea Only Current Immunizations  Reviewed on 8/15/2017 Name Date Influenza High Dose Vaccine PF 8/14/2017, 8/29/2013 Influenza Vaccine 8/8/2016, 10/1/2015, 9/15/2014 Influenza Vaccine Whole 9/25/2012 Pneumococcal Conjugate (PCV-13) 3/17/2015 Pneumococcal Polysaccharide (PPSV-23) 4/23/2016 TDAP Vaccine 7/3/2012 Tdap 8/17/2013 Varicella Virus Vaccine Live 9/1/2007 ZZZ-RETIRED (DO NOT USE) Pneumococcal Vaccine (Unspecified Type) 12/1/2009 Not reviewed this visit You Were Diagnosed With   
  
 Codes Comments Nausea    -  Primary ICD-10-CM: R11.0 ICD-9-CM: 787.02 Resting tremor     ICD-10-CM: R25.9 ICD-9-CM: 781.0 Orthostatic hypotension     ICD-10-CM: I95.1 ICD-9-CM: 458.0 Vitals BP Pulse Temp Resp Height(growth percentile) Weight(growth percentile) 120/76 (BP 1 Location: Right arm, BP Patient Position: Sitting) 76 97.6 °F (36.4 °C) (Oral) 16 5' 6\" (1.676 m) 142 lb 9.6 oz (64.7 kg) SpO2 BMI OB Status Smoking Status 99% 23.02 kg/m2 Hysterectomy Never Smoker Vitals History BMI and BSA Data Body Mass Index Body Surface Area 23.02 kg/m 2 1.74 m 2 Preferred Pharmacy Pharmacy Name Phone [facility] [address] [place] [address], [address] [place] [address] [phone] Your Updated Medication List  
  
   
This list is accurate as of 7/26/18  8:54 AM.  Always use your most recent med list.  
  
  
  
  
 acetaminophen 325 mg tablet Commonly known as:  TYLENOL Take 325 mg by mouth every four (4) hours as needed for Pain. atorvastatin 20 mg tablet Commonly known as:  LIPITOR  
TAKE 1 TABLET BY MOUTH EVERY EVENING  
  
 carbidopa-levodopa  mg per tablet Commonly known as:  SINEMET  
 Take 1 Tab by mouth three (3) times daily. CORICIDIN PO Take  by mouth as needed. fludrocortisone 0.1 mg tablet Commonly known as:  FLORINEF  
TAKE 1 TABLET BY MOUTH TWICE DAILY  
  
 midodrine 5 mg tablet Commonly known as:  Jasper Mussel Take  by mouth three (3) times daily. ondansetron 4 mg disintegrating tablet Commonly known as:  ZOFRAN ODT Take 1 Tab by mouth every eight (8) hours as needed for Nausea. PARoxetine 30 mg tablet Commonly known as:  PAXIL Take 30 mg by mouth daily. Eitan Quintana NP/ Dr John Greene  
  
 potassium chloride 20 mEq tablet Commonly known as:  K-DUR, KLOR-CON  
TAKE 1 TABLET BY MOUTH DAILY PROBIOTIC PO Take  by mouth daily. pyridostigmine 60 mg tablet Commonly known as:  MESTINON  
TAKE 1 TABLET BY MOUTH THREE TIMES DAILY QUEtiapine 25 mg tablet Commonly known as:  SEROquel Take 25 mg by mouth nightly as needed. Take 1-2 tabs qhs prn Eitan Quintana NP/Dr Woodson  
  
 sodium chloride 1 gram tablet Take 1 g by mouth two (2) times a day. Prescriptions Sent to Pharmacy Refills  
 ondansetron (ZOFRAN ODT) 4 mg disintegrating tablet 0 Sig: Take 1 Tab by mouth every eight (8) hours as needed for Nausea. Class: Normal  
 Pharmacy: [facility] [address] [place] [address] Ph #: [phone] Route: Oral  
  
We Performed the Following CBC WITH AUTOMATED DIFF [62951 CPT(R)] LIPASE [id number] CPT(R)] Follow-up Instructions Return if symptoms worsen or fail to improve before scheduled appointment. Patient Instructions It was a pleasure to see you! As discussed: Your symptoms are likely from a medication side effect (Sinemet). I've ordered labs to look for other causes. Please contact Dr. Casi Bryan Nausea and Vomiting: Care Instructions Your Care Instructions When you are nauseated, you may feel weak and sweaty and notice a lot of saliva in your mouth. Nausea often leads to vomiting. Most of the time you do not need to worry about nausea and vomiting, but they can be signs of other illnesses. Two common causes of nausea and vomiting are stomach flu and food poisoning. Nausea and vomiting from viral stomach flu will usually start to improve within 24 hours. Nausea and vomiting from food poisoning may last from 12 to 48 hours. The doctor has checked you carefully, but problems can develop later. If you notice any problems or new symptoms, get medical treatment right away. Follow-up care is a key part of your treatment and safety. Be sure to make and go to all appointments, and call your doctor if you are having problems. It's also a good idea to know your test results and keep a list of the medicines you take. How can you care for yourself at home? · To prevent dehydration, drink plenty of fluids, enough so that your urine is light yellow or clear like water. Choose water and other caffeine-free clear liquids until you feel better. If you have kidney, heart, or liver disease and have to limit fluids, talk with your doctor before you increase the amount of fluids you drink. · Rest in bed until you feel better. · When you are able to eat, try clear soups, mild foods, and liquids until all symptoms are gone for 12 to 48 hours. Other good choices include dry toast, crackers, cooked cereal, and gelatin dessert, such as Jell-O. When should you call for help? Call 911 anytime you think you may need emergency care. For example, call if: 
  · You passed out (lost consciousness).  
 Call your doctor now or seek immediate medical care if: 
  · You have symptoms of dehydration, such as: ¨ Dry eyes and a dry mouth. ¨ Passing only a little dark urine. ¨ Feeling thirstier than usual.  
  · You have new or worsening belly pain.  
  · You have a new or higher fever.   · You vomit blood or what looks like coffee grounds.  
 Watch closely for changes in your health, and be sure to contact your doctor if: 
  · You have ongoing nausea and vomiting.  
  · Your vomiting is getting worse.  
  · Your vomiting lasts longer than 2 days.  
  · You are not getting better as expected. Where can you learn more? Go to http://ash-jeronimo.info/. Enter 25 [id number] in the search box to learn more about \"Nausea and Vomiting: Care Instructions. \" Current as of: November 20, 2017 Content Version: 11.7 © 0703-3571 [employer]. Care instructions adapted under license by [employer] (which disclaims liability or warranty for this information). If you have questions about a medical condition or this instruction, always ask your healthcare professional. [address] any warranty or liability for your use of this information. Introducing \[facility]\"" & HEALTH SERVICES! Dear Keagan Henning: Thank you for requesting a [employer] account. Our records indicate that you already have an active [employer] account. You can access your account anytime at [email or web address] Did you know that you can access your hospital and ER discharge instructions at any time in [employer]? You can also review all of your test results from your hospital stay or ER visit. Additional Information If you have questions, please visit the Frequently Asked Questions section of the [employer] website at [email or web address] [email or web address]/. Remember, [employer] is NOT to be used for urgent needs. For medical emergencies, dial 911. Now available from your iPhone and Android! Please provide this summary of care documentation to your next provider. Your primary care clinician is listed as RYLEE RASHID. If you have any questions after today's visit, please call [phone]. No

## 2024-11-29 NOTE — ED PROVIDER NOTE - PATIENT PORTAL LINK FT
You can access the FollowMyHealth Patient Portal offered by [facility] by registering at the following website: [email or web address]. By joining [employer]’s FollowMyHealth portal, you will also be able to view your health information using other applications (apps) compatible with our system.

## 2024-11-29 NOTE — ED PROVIDER NOTE - ATTENDING CONTRIBUTION TO CARE
I have personally performed a face to face medical and diagnostic evaluation of the patient. I have discussed with and reviewed the Resident's note and agree with the History, ROS, Physical Exam and MDM unless otherwise indicated. A brief summary of my personal evaluation and impression can be found below.    Obde TORRES: 74-year-old male, history of hyperlipidemia BPH ocular sinus cancer, recently completing chemotherapy last week, presents to the ED with a chief complaint of concern for possible allergic reaction, patient reports around 530 this evening he started developing itchy red rash to his bilateral thighs abdomen upper extremities without clear trigger, has never had an eruption like this before, no new foods pets detergents linens or other exposures.  Patient reports rashes significantly improved upon ED arrival, not painful.  He denies any nausea vomiting chest pain trouble breathing wheezing urinary or bowel complaints, denies any throat closing sensation or throat discomfort.    All other ROS negative, except as above and as per HPI and ROS section.    VITALS: Initial triage and subsequent vitals have been reviewed by me.  GEN APPEARANCE: Alert, non-toxic, well-appearing, NAD.  HEAD: Atraumatic.  EYES: PERRLa, EOMI, vision grossly intact.   MOUTH: MMM, no tonsillar erythema, swelling or exudates, protecting airway, handling secretions.=  NECK: Supple  CV: RRR, S1S2, no c/r/m/g. Cap refill <2 seconds. No bruits.   LUNGS: CTAB. No abnormal breath sounds.  ABDOMEN: Soft, NTND. No guarding or rebound.   MSK/EXT: No spinal or extremity point tenderness. No CVA ttp. Pelvis stable. No peripheral edema.  NEURO: Alert, follows commands. Weight bearing normal. Speech normal. Sensation and motor normal x4 extremities.   SKIN: mild erythematous blanching urticarial like rash to b/l thighs, no rash to palms/soles   PSYCH: Appropriate    Plan/MDM: Exam mild tachycardia otherwise vitals stable nontoxic very well-appearing with physical exam as above DDx concern for likely mild allergic reaction of unclear trigger, consider possible dryer air in setting of changing seasons?  No new soaps exposures or foods that are concerning to patient, patient also reports significant improvement upon my assessment in the ER, presentation not consistent with that of anaphylaxis, given near complete resolution of symptoms we will discharge with p.o. Benadryl wife to drive patient home.  Strict return precautions were discussed, patient is agreeable and comfortable plan for discharge and monitoring symptoms at home.

## 2024-11-29 NOTE — ED PROVIDER NOTE - PATIENT'S GENDER IDENTITY
[facility]   Pharmacy Pharmacokinetic Monitoring Service - Vancomycin     Michael Szymanski is a 87 y.o. male starting on vancomycin therapy for Skin and Soft Tissue Infection. Pharmacy consulted by LAMINE Raya, for monitoring and adjustment.    Target Concentration: Goal AUC/BARBARA 400-600 mg*hr/L    Additional Antimicrobials: Cefepime    Pertinent Laboratory Values:   Wt Readings from Last 1 Encounters:   08/01/24 89.8 kg (198 lb)     Temp Readings from Last 1 Encounters:   08/01/24 98 °F (36.7 °C) (Oral)     Estimated Creatinine Clearance: 61 mL/min (based on SCr of 1 mg/dL).  Recent Labs     08/01/24  0930   CREATININE 1.0   BUN 23   WBC 11.6*     Procalcitonin: no level    Pertinent Cultures:  Culture Date Source Results   08/01/24 Blood x 2  Sent    MRSA Nasal Swab: N/A. Non-respiratory infection.    Plan:  Dosing recommendations based on Bayesian software  Start vancomycin 2250 mg IV x 1 dose, followed by 1500 mg IV every 24 hours  Anticipated AUC of 502 and trough concentration of 13 at steady state  Renal labs as indicated   Vancomycin concentration ordered for 08/03 @ 0730   Pharmacy will continue to monitor patient and adjust therapy as indicated    Thank you for the consult,  Eloina Salgado RPH  8/1/2024 10:51 AM    Male

## 2024-11-29 NOTE — ED ADULT NURSE NOTE - DRUG PRE-SCREENING (DAST -1)
Headaches are unchanged.  She is interested in the new migraine medication although I had no experience with it so for the time being her to keep her on Maxalt until the drug is on the market a little longer       Statement Selected

## 2024-12-10 PROBLEM — C31.9 MALIGNANT NEOPLASM OF ACCESSORY SINUS, UNSPECIFIED: Chronic | Status: ACTIVE | Noted: 2024-11-29

## 2024-12-10 RX ORDER — OFLOXACIN OTIC 3 MG/ML
0.3 SOLUTION AURICULAR (OTIC)
Qty: 2 | Refills: 1 | Status: ACTIVE | COMMUNITY
Start: 2024-12-10 | End: 1900-01-01

## 2024-12-11 PROCEDURE — 77301 RADIOTHERAPY DOSE PLAN IMRT: CPT | Mod: 26

## 2024-12-11 PROCEDURE — 77338 DESIGN MLC DEVICE FOR IMRT: CPT | Mod: 26

## 2024-12-11 PROCEDURE — 77300 RADIATION THERAPY DOSE PLAN: CPT | Mod: 26

## 2024-12-12 DIAGNOSIS — H92.11 OTORRHEA, RIGHT EAR: ICD-10-CM

## 2024-12-12 RX ORDER — COLISTIN SULFATE, NEOMYCIN SULFATE, THONZONIUM BROMIDE AND HYDROCORTISONE ACETATE 3; 3.3; .5; 1 MG/ML; MG/ML; MG/ML; MG/ML
3.3-3-10-0.5 SUSPENSION AURICULAR (OTIC) 4 TIMES DAILY
Qty: 1 | Refills: 1 | Status: ACTIVE | COMMUNITY
Start: 2024-12-12 | End: 1900-01-01

## 2024-12-16 RX ORDER — NEOMYCIN SULFATE, POLYMYXIN B SULFATE, HYDROCORTISONE 3.5; 10000; 1 MG/ML; [USP'U]/ML; MG/ML
1 SOLUTION/ DROPS AURICULAR (OTIC) 4 TIMES DAILY
Qty: 1 | Refills: 1 | Status: ACTIVE | COMMUNITY
Start: 2024-12-16 | End: 1900-01-01

## 2024-12-17 ENCOUNTER — APPOINTMENT (OUTPATIENT)
Dept: OTOLARYNGOLOGY | Facility: CLINIC | Age: 74
End: 2024-12-17

## 2024-12-18 ENCOUNTER — NON-APPOINTMENT (OUTPATIENT)
Age: 74
End: 2024-12-18

## 2024-12-23 PROCEDURE — 77427 RADIATION TX MANAGEMENT X5: CPT

## 2024-12-23 PROCEDURE — G6002: CPT | Mod: 26

## 2024-12-24 ENCOUNTER — RESULT REVIEW (OUTPATIENT)
Age: 74
End: 2024-12-24

## 2024-12-24 ENCOUNTER — APPOINTMENT (OUTPATIENT)
Dept: INFUSION THERAPY | Facility: HOSPITAL | Age: 74
End: 2024-12-24

## 2024-12-24 LAB
BASOPHILS # BLD AUTO: 0.02 K/UL — SIGNIFICANT CHANGE UP (ref 0–0.2)
BASOPHILS NFR BLD AUTO: 0.3 % — SIGNIFICANT CHANGE UP (ref 0–2)
EOSINOPHIL # BLD AUTO: 0.05 K/UL — SIGNIFICANT CHANGE UP (ref 0–0.5)
EOSINOPHIL NFR BLD AUTO: 0.8 % — SIGNIFICANT CHANGE UP (ref 0–6)
HCT VFR BLD CALC: 34.8 % — LOW (ref 39–50)
HGB BLD-MCNC: 11.4 G/DL — LOW (ref 13–17)
IMM GRANULOCYTES NFR BLD AUTO: 0.3 % — SIGNIFICANT CHANGE UP (ref 0–0.9)
LYMPHOCYTES # BLD AUTO: 1.36 K/UL — SIGNIFICANT CHANGE UP (ref 1–3.3)
LYMPHOCYTES # BLD AUTO: 22.7 % — SIGNIFICANT CHANGE UP (ref 13–44)
MCHC RBC-ENTMCNC: 32.8 G/DL — SIGNIFICANT CHANGE UP (ref 32–36)
MCHC RBC-ENTMCNC: 33.5 PG — SIGNIFICANT CHANGE UP (ref 27–34)
MCV RBC AUTO: 102.4 FL — HIGH (ref 80–100)
MONOCYTES # BLD AUTO: 0.82 K/UL — SIGNIFICANT CHANGE UP (ref 0–0.9)
MONOCYTES NFR BLD AUTO: 13.7 % — SIGNIFICANT CHANGE UP (ref 2–14)
NEUTROPHILS # BLD AUTO: 3.73 K/UL — SIGNIFICANT CHANGE UP (ref 1.8–7.4)
NEUTROPHILS NFR BLD AUTO: 62.2 % — SIGNIFICANT CHANGE UP (ref 43–77)
NRBC # BLD: 0 /100 WBCS — SIGNIFICANT CHANGE UP (ref 0–0)
PLATELET # BLD AUTO: 161 K/UL — SIGNIFICANT CHANGE UP (ref 150–400)
RBC # BLD: 3.4 M/UL — LOW (ref 4.2–5.8)
RBC # FLD: 15 % — HIGH (ref 10.3–14.5)
WBC # BLD: 6 K/UL — SIGNIFICANT CHANGE UP (ref 3.8–10.5)
WBC # FLD AUTO: 6 K/UL — SIGNIFICANT CHANGE UP (ref 3.8–10.5)

## 2024-12-24 PROCEDURE — G6002: CPT | Mod: 26

## 2024-12-26 ENCOUNTER — NON-APPOINTMENT (OUTPATIENT)
Age: 74
End: 2024-12-26

## 2024-12-26 VITALS
HEART RATE: 93 BPM | DIASTOLIC BLOOD PRESSURE: 76 MMHG | OXYGEN SATURATION: 96 % | RESPIRATION RATE: 16 BRPM | SYSTOLIC BLOOD PRESSURE: 134 MMHG

## 2024-12-26 PROCEDURE — G6002: CPT | Mod: 26

## 2024-12-27 ENCOUNTER — RESULT REVIEW (OUTPATIENT)
Age: 74
End: 2024-12-27

## 2024-12-27 ENCOUNTER — APPOINTMENT (OUTPATIENT)
Dept: INFUSION THERAPY | Facility: HOSPITAL | Age: 74
End: 2024-12-27

## 2024-12-27 ENCOUNTER — APPOINTMENT (OUTPATIENT)
Dept: HEMATOLOGY ONCOLOGY | Facility: CLINIC | Age: 74
End: 2024-12-27
Payer: MEDICARE

## 2024-12-27 PROBLEM — G89.3 CANCER-RELATED PAIN: Status: ACTIVE | Noted: 2024-12-27

## 2024-12-27 PROBLEM — K11.7 XEROSTOMIA: Status: ACTIVE | Noted: 2024-12-27

## 2024-12-27 LAB
ANION GAP SERPL CALC-SCNC: 15 MMOL/L — SIGNIFICANT CHANGE UP (ref 5–17)
BUN SERPL-MCNC: 20 MG/DL — SIGNIFICANT CHANGE UP (ref 7–23)
CALCIUM SERPL-MCNC: 10 MG/DL — SIGNIFICANT CHANGE UP (ref 8.4–10.5)
CHLORIDE SERPL-SCNC: 102 MMOL/L — SIGNIFICANT CHANGE UP (ref 96–108)
CO2 SERPL-SCNC: 22 MMOL/L — SIGNIFICANT CHANGE UP (ref 22–31)
CREAT SERPL-MCNC: 0.99 MG/DL — SIGNIFICANT CHANGE UP (ref 0.5–1.3)
EGFR: 81 ML/MIN/1.73M2 — SIGNIFICANT CHANGE UP
GLUCOSE SERPL-MCNC: 122 MG/DL — HIGH (ref 70–99)
MAGNESIUM SERPL-MCNC: 1.7 MG/DL — SIGNIFICANT CHANGE UP (ref 1.6–2.6)
POTASSIUM SERPL-MCNC: 4 MMOL/L — SIGNIFICANT CHANGE UP (ref 3.5–5.3)
POTASSIUM SERPL-SCNC: 4 MMOL/L — SIGNIFICANT CHANGE UP (ref 3.5–5.3)
SODIUM SERPL-SCNC: 139 MMOL/L — SIGNIFICANT CHANGE UP (ref 135–145)

## 2024-12-27 PROCEDURE — 77014: CPT | Mod: 26

## 2024-12-27 PROCEDURE — 99214 OFFICE O/P EST MOD 30 MIN: CPT

## 2024-12-27 RX ORDER — DIPHENHYDRAMINE HYDROCHLORIDE AND LIDOCAINE HYDROCHLORIDE AND ALUMINUM HYDROXIDE AND MAGNESIUM HYDRO
KIT EVERY 4 HOURS
Qty: 1 | Refills: 2 | Status: ACTIVE | COMMUNITY
Start: 2024-12-27 | End: 1900-01-01

## 2024-12-30 ENCOUNTER — APPOINTMENT (OUTPATIENT)
Dept: INFUSION THERAPY | Facility: HOSPITAL | Age: 74
End: 2024-12-30

## 2024-12-30 ENCOUNTER — APPOINTMENT (OUTPATIENT)
Dept: HEMATOLOGY ONCOLOGY | Facility: CLINIC | Age: 74
End: 2024-12-30
Payer: MEDICARE

## 2024-12-30 ENCOUNTER — RESULT REVIEW (OUTPATIENT)
Age: 74
End: 2024-12-30

## 2024-12-30 LAB
BASOPHILS # BLD AUTO: 0.04 K/UL — SIGNIFICANT CHANGE UP (ref 0–0.2)
BASOPHILS NFR BLD AUTO: 0.7 % — SIGNIFICANT CHANGE UP (ref 0–2)
EOSINOPHIL # BLD AUTO: 0.05 K/UL — SIGNIFICANT CHANGE UP (ref 0–0.5)
EOSINOPHIL NFR BLD AUTO: 0.9 % — SIGNIFICANT CHANGE UP (ref 0–6)
HCT VFR BLD CALC: 33.8 % — LOW (ref 39–50)
HGB BLD-MCNC: 11.5 G/DL — LOW (ref 13–17)
IMM GRANULOCYTES NFR BLD AUTO: 3 % — HIGH (ref 0–0.9)
LYMPHOCYTES # BLD AUTO: 1.19 K/UL — SIGNIFICANT CHANGE UP (ref 1–3.3)
LYMPHOCYTES # BLD AUTO: 22.2 % — SIGNIFICANT CHANGE UP (ref 13–44)
MCHC RBC-ENTMCNC: 33.5 PG — SIGNIFICANT CHANGE UP (ref 27–34)
MCHC RBC-ENTMCNC: 34 G/DL — SIGNIFICANT CHANGE UP (ref 32–36)
MCV RBC AUTO: 98.5 FL — SIGNIFICANT CHANGE UP (ref 80–100)
MONOCYTES # BLD AUTO: 1.01 K/UL — HIGH (ref 0–0.9)
MONOCYTES NFR BLD AUTO: 18.8 % — HIGH (ref 2–14)
NEUTROPHILS # BLD AUTO: 2.91 K/UL — SIGNIFICANT CHANGE UP (ref 1.8–7.4)
NEUTROPHILS NFR BLD AUTO: 54.4 % — SIGNIFICANT CHANGE UP (ref 43–77)
NRBC # BLD: 0 /100 WBCS — SIGNIFICANT CHANGE UP (ref 0–0)
PLATELET # BLD AUTO: 170 K/UL — SIGNIFICANT CHANGE UP (ref 150–400)
RBC # BLD: 3.43 M/UL — LOW (ref 4.2–5.8)
RBC # FLD: 14.5 % — SIGNIFICANT CHANGE UP (ref 10.3–14.5)
WBC # BLD: 5.36 K/UL — SIGNIFICANT CHANGE UP (ref 3.8–10.5)
WBC # FLD AUTO: 5.36 K/UL — SIGNIFICANT CHANGE UP (ref 3.8–10.5)

## 2024-12-30 PROCEDURE — G6002: CPT | Mod: 26

## 2024-12-30 PROCEDURE — 99213 OFFICE O/P EST LOW 20 MIN: CPT

## 2024-12-30 RX ORDER — GABAPENTIN 300 MG/1
300 CAPSULE ORAL
Qty: 30 | Refills: 2 | Status: ACTIVE | COMMUNITY
Start: 2024-12-30 | End: 1900-01-01

## 2024-12-31 ENCOUNTER — NON-APPOINTMENT (OUTPATIENT)
Age: 74
End: 2024-12-31

## 2024-12-31 PROCEDURE — G6002: CPT | Mod: 26

## 2024-12-31 PROCEDURE — 77427 RADIATION TX MANAGEMENT X5: CPT

## 2025-01-01 ENCOUNTER — APPOINTMENT (OUTPATIENT)
Dept: HEMATOLOGY ONCOLOGY | Facility: CLINIC | Age: 75
End: 2025-01-01

## 2025-01-01 ENCOUNTER — RESULT REVIEW (OUTPATIENT)
Age: 75
End: 2025-01-01

## 2025-01-01 ENCOUNTER — APPOINTMENT (OUTPATIENT)
Dept: HEMATOLOGY ONCOLOGY | Facility: CLINIC | Age: 75
End: 2025-01-01
Payer: MEDICARE

## 2025-01-01 ENCOUNTER — INPATIENT (INPATIENT)
Facility: HOSPITAL | Age: 75
LOS: 10 days | Discharge: HOME | End: 2025-07-08
Attending: STUDENT IN AN ORGANIZED HEALTH CARE EDUCATION/TRAINING PROGRAM | Admitting: STUDENT IN AN ORGANIZED HEALTH CARE EDUCATION/TRAINING PROGRAM
Payer: MEDICARE

## 2025-01-01 ENCOUNTER — NON-APPOINTMENT (OUTPATIENT)
Age: 75
End: 2025-01-01

## 2025-01-01 ENCOUNTER — INPATIENT (INPATIENT)
Facility: HOSPITAL | Age: 75
LOS: 0 days | End: 2025-08-06
Attending: HOSPITALIST | Admitting: HOSPITALIST
Payer: MEDICARE

## 2025-01-01 ENCOUNTER — INPATIENT (INPATIENT)
Facility: HOSPITAL | Age: 75
LOS: 0 days | Discharge: HOME | End: 2025-06-07
Attending: OTOLARYNGOLOGY | Admitting: OTOLARYNGOLOGY
Payer: MEDICARE

## 2025-01-01 ENCOUNTER — APPOINTMENT (OUTPATIENT)
Dept: INFUSION THERAPY | Facility: HOSPITAL | Age: 75
End: 2025-01-01

## 2025-01-01 ENCOUNTER — OUTPATIENT (OUTPATIENT)
Dept: OUTPATIENT SERVICES | Facility: HOSPITAL | Age: 75
LOS: 1 days | End: 2025-01-01
Payer: MEDICARE

## 2025-01-01 ENCOUNTER — APPOINTMENT (OUTPATIENT)
Dept: CT IMAGING | Facility: HOSPITAL | Age: 75
End: 2025-01-01

## 2025-01-01 ENCOUNTER — APPOINTMENT (OUTPATIENT)
Dept: GERIATRICS | Facility: CLINIC | Age: 75
End: 2025-01-01

## 2025-01-01 ENCOUNTER — OUTPATIENT (OUTPATIENT)
Dept: OUTPATIENT SERVICES | Facility: HOSPITAL | Age: 75
LOS: 1 days | Discharge: HOME | End: 2025-01-01

## 2025-01-01 VITALS
RESPIRATION RATE: 12 BRPM | DIASTOLIC BLOOD PRESSURE: 54 MMHG | HEART RATE: 70 BPM | WEIGHT: 173.06 LBS | OXYGEN SATURATION: 100 % | TEMPERATURE: 98 F | HEIGHT: 68 IN | SYSTOLIC BLOOD PRESSURE: 97 MMHG

## 2025-01-01 VITALS
TEMPERATURE: 97 F | RESPIRATION RATE: 17 BRPM | DIASTOLIC BLOOD PRESSURE: 67 MMHG | SYSTOLIC BLOOD PRESSURE: 122 MMHG | HEART RATE: 84 BPM | OXYGEN SATURATION: 96 %

## 2025-01-01 VITALS
TEMPERATURE: 98 F | RESPIRATION RATE: 20 BRPM | HEART RATE: 101 BPM | SYSTOLIC BLOOD PRESSURE: 105 MMHG | OXYGEN SATURATION: 98 % | DIASTOLIC BLOOD PRESSURE: 73 MMHG

## 2025-01-01 VITALS
HEART RATE: 88 BPM | SYSTOLIC BLOOD PRESSURE: 113 MMHG | RESPIRATION RATE: 18 BRPM | OXYGEN SATURATION: 98 % | DIASTOLIC BLOOD PRESSURE: 68 MMHG | TEMPERATURE: 98 F

## 2025-01-01 VITALS
HEART RATE: 96 BPM | TEMPERATURE: 101 F | HEIGHT: 69 IN | RESPIRATION RATE: 22 BRPM | WEIGHT: 151.9 LBS | DIASTOLIC BLOOD PRESSURE: 82 MMHG | SYSTOLIC BLOOD PRESSURE: 118 MMHG | OXYGEN SATURATION: 90 %

## 2025-01-01 VITALS
OXYGEN SATURATION: 97 % | RESPIRATION RATE: 19 BRPM | SYSTOLIC BLOOD PRESSURE: 121 MMHG | HEART RATE: 62 BPM | DIASTOLIC BLOOD PRESSURE: 68 MMHG | TEMPERATURE: 98 F

## 2025-01-01 VITALS
HEIGHT: 64.17 IN | OXYGEN SATURATION: 100 % | RESPIRATION RATE: 20 BRPM | SYSTOLIC BLOOD PRESSURE: 116 MMHG | TEMPERATURE: 101 F | DIASTOLIC BLOOD PRESSURE: 74 MMHG | HEART RATE: 138 BPM

## 2025-01-01 VITALS
SYSTOLIC BLOOD PRESSURE: 119 MMHG | RESPIRATION RATE: 16 BRPM | TEMPERATURE: 97 F | OXYGEN SATURATION: 96 % | DIASTOLIC BLOOD PRESSURE: 69 MMHG | HEART RATE: 88 BPM

## 2025-01-01 DIAGNOSIS — I48.91 UNSPECIFIED ATRIAL FIBRILLATION: ICD-10-CM

## 2025-01-01 DIAGNOSIS — Z96.651 PRESENCE OF RIGHT ARTIFICIAL KNEE JOINT: Chronic | ICD-10-CM

## 2025-01-01 DIAGNOSIS — C11.9 MALIGNANT NEOPLASM OF NASOPHARYNX, UNSPECIFIED: ICD-10-CM

## 2025-01-01 DIAGNOSIS — D49.0 NEOPLASM OF UNSPECIFIED BEHAVIOR OF DIGESTIVE SYSTEM: ICD-10-CM

## 2025-01-01 DIAGNOSIS — A41.9 SEPSIS, UNSPECIFIED ORGANISM: ICD-10-CM

## 2025-01-01 DIAGNOSIS — D64.81 ANEMIA DUE TO ANTINEOPLASTIC CHEMOTHERAPY: ICD-10-CM

## 2025-01-01 DIAGNOSIS — Z98.49 CATARACT EXTRACTION STATUS, UNSPECIFIED EYE: Chronic | ICD-10-CM

## 2025-01-01 DIAGNOSIS — K57.92 DIVERTICULITIS OF INTESTINE, PART UNSPECIFIED, WITHOUT PERFORATION OR ABSCESS WITHOUT BLEEDING: ICD-10-CM

## 2025-01-01 DIAGNOSIS — R53.83 OTHER FATIGUE: ICD-10-CM

## 2025-01-01 DIAGNOSIS — E78.5 HYPERLIPIDEMIA, UNSPECIFIED: ICD-10-CM

## 2025-01-01 DIAGNOSIS — J96.01 ACUTE RESPIRATORY FAILURE WITH HYPOXIA: ICD-10-CM

## 2025-01-01 DIAGNOSIS — Z98.890 OTHER SPECIFIED POSTPROCEDURAL STATES: Chronic | ICD-10-CM

## 2025-01-01 DIAGNOSIS — K57.20 DIVERTICULITIS OF LARGE INTESTINE WITH PERFORATION AND ABSCESS WITHOUT BLEEDING: ICD-10-CM

## 2025-01-01 DIAGNOSIS — R53.82 CHRONIC FATIGUE, UNSPECIFIED: ICD-10-CM

## 2025-01-01 DIAGNOSIS — R78.81 BACTEREMIA: ICD-10-CM

## 2025-01-01 DIAGNOSIS — J38.4 EDEMA OF LARYNX: ICD-10-CM

## 2025-01-01 DIAGNOSIS — R22.1 LOCALIZED SWELLING, MASS AND LUMP, NECK: ICD-10-CM

## 2025-01-01 DIAGNOSIS — Z46.82 ENCOUNTER FOR FITTING AND ADJUSTMENT OF NON-VASCULAR CATHETER: ICD-10-CM

## 2025-01-01 DIAGNOSIS — Z29.9 ENCOUNTER FOR PROPHYLACTIC MEASURES, UNSPECIFIED: ICD-10-CM

## 2025-01-01 DIAGNOSIS — R13.10 DYSPHAGIA, UNSPECIFIED: ICD-10-CM

## 2025-01-01 DIAGNOSIS — G93.41 METABOLIC ENCEPHALOPATHY: ICD-10-CM

## 2025-01-01 DIAGNOSIS — Z51.11 ENCOUNTER FOR ANTINEOPLASTIC CHEMOTHERAPY: ICD-10-CM

## 2025-01-01 DIAGNOSIS — K11.7 DISTURBANCES OF SALIVARY SECRETION: ICD-10-CM

## 2025-01-01 DIAGNOSIS — C77.0 SECONDARY AND UNSPECIFIED MALIGNANT NEOPLASM OF LYMPH NODES OF HEAD, FACE AND NECK: ICD-10-CM

## 2025-01-01 DIAGNOSIS — J96.02 ACUTE RESPIRATORY FAILURE WITH HYPERCAPNIA: ICD-10-CM

## 2025-01-01 DIAGNOSIS — N40.0 BENIGN PROSTATIC HYPERPLASIA WITHOUT LOWER URINARY TRACT SYMPTOMS: ICD-10-CM

## 2025-01-01 DIAGNOSIS — T45.1X5A ANEMIA DUE TO ANTINEOPLASTIC CHEMOTHERAPY: ICD-10-CM

## 2025-01-01 DIAGNOSIS — Z96.652 PRESENCE OF LEFT ARTIFICIAL KNEE JOINT: Chronic | ICD-10-CM

## 2025-01-01 DIAGNOSIS — R05.9 COUGH, UNSPECIFIED: ICD-10-CM

## 2025-01-01 DIAGNOSIS — G89.3 NEOPLASM RELATED PAIN (ACUTE) (CHRONIC): ICD-10-CM

## 2025-01-01 DIAGNOSIS — E87.1 HYPO-OSMOLALITY AND HYPONATREMIA: ICD-10-CM

## 2025-01-01 DIAGNOSIS — R06.02 SHORTNESS OF BREATH: ICD-10-CM

## 2025-01-01 DIAGNOSIS — R11.2 NAUSEA WITH VOMITING, UNSPECIFIED: ICD-10-CM

## 2025-01-01 DIAGNOSIS — Z51.5 ENCOUNTER FOR PALLIATIVE CARE: ICD-10-CM

## 2025-01-01 DIAGNOSIS — I87.8 OTHER SPECIFIED DISORDERS OF VEINS: ICD-10-CM

## 2025-01-01 DIAGNOSIS — Z71.89 OTHER SPECIFIED COUNSELING: ICD-10-CM

## 2025-01-01 LAB
-  AMPICILLIN/SULBACTAM: SIGNIFICANT CHANGE UP
-  AMPICILLIN: SIGNIFICANT CHANGE UP
-  AMPICILLIN: SIGNIFICANT CHANGE UP
-  AZTREONAM: SIGNIFICANT CHANGE UP
-  CEFAZOLIN: SIGNIFICANT CHANGE UP
-  CEFEPIME: SIGNIFICANT CHANGE UP
-  CEFOXITIN: SIGNIFICANT CHANGE UP
-  CEFTRIAXONE: SIGNIFICANT CHANGE UP
-  CIPROFLOXACIN: SIGNIFICANT CHANGE UP
-  ERTAPENEM: SIGNIFICANT CHANGE UP
-  GENTAMICIN: SIGNIFICANT CHANGE UP
-  IMIPENEM: SIGNIFICANT CHANGE UP
-  LEVOFLOXACIN: SIGNIFICANT CHANGE UP
-  MEROPENEM: SIGNIFICANT CHANGE UP
-  PIPERACILLIN/TAZOBACTAM: SIGNIFICANT CHANGE UP
-  TIGECYCLINE: SIGNIFICANT CHANGE UP
-  TOBRAMYCIN: SIGNIFICANT CHANGE UP
-  TRIMETHOPRIM/SULFAMETHOXAZOLE: SIGNIFICANT CHANGE UP
-  VANCOMYCIN: SIGNIFICANT CHANGE UP
ADD ON TEST-SPECIMEN IN LAB: SIGNIFICANT CHANGE UP
ALBUMIN SERPL ELPH-MCNC: 2.9 G/DL — LOW (ref 3.3–5)
ALBUMIN SERPL ELPH-MCNC: 3.2 G/DL — LOW (ref 3.3–5)
ALBUMIN SERPL ELPH-MCNC: 3.3 G/DL — SIGNIFICANT CHANGE UP (ref 3.3–5)
ALBUMIN SERPL ELPH-MCNC: 3.3 G/DL — SIGNIFICANT CHANGE UP (ref 3.3–5)
ALBUMIN SERPL ELPH-MCNC: 3.4 G/DL — SIGNIFICANT CHANGE UP (ref 3.3–5)
ALBUMIN SERPL ELPH-MCNC: 3.6 G/DL — SIGNIFICANT CHANGE UP (ref 3.3–5)
ALBUMIN SERPL ELPH-MCNC: 3.8 G/DL — SIGNIFICANT CHANGE UP (ref 3.3–5)
ALP SERPL-CCNC: 58 U/L — SIGNIFICANT CHANGE UP (ref 40–120)
ALP SERPL-CCNC: 62 U/L — SIGNIFICANT CHANGE UP (ref 40–120)
ALP SERPL-CCNC: 64 U/L — SIGNIFICANT CHANGE UP (ref 40–120)
ALP SERPL-CCNC: 70 U/L — SIGNIFICANT CHANGE UP (ref 40–120)
ALP SERPL-CCNC: 70 U/L — SIGNIFICANT CHANGE UP (ref 40–120)
ALP SERPL-CCNC: 73 U/L — SIGNIFICANT CHANGE UP (ref 40–120)
ALP SERPL-CCNC: 74 U/L — SIGNIFICANT CHANGE UP (ref 40–120)
ALT FLD-CCNC: 14 U/L — SIGNIFICANT CHANGE UP (ref 4–41)
ALT FLD-CCNC: 18 U/L — SIGNIFICANT CHANGE UP (ref 4–41)
ALT FLD-CCNC: 20 U/L — SIGNIFICANT CHANGE UP (ref 10–45)
ALT FLD-CCNC: 21 U/L — SIGNIFICANT CHANGE UP (ref 10–45)
ALT FLD-CCNC: 21 U/L — SIGNIFICANT CHANGE UP (ref 4–41)
ALT FLD-CCNC: 24 U/L — SIGNIFICANT CHANGE UP (ref 10–45)
ALT FLD-CCNC: 30 U/L — SIGNIFICANT CHANGE UP (ref 4–41)
ANION GAP SERPL CALC-SCNC: 10 MMOL/L — SIGNIFICANT CHANGE UP (ref 5–17)
ANION GAP SERPL CALC-SCNC: 10 MMOL/L — SIGNIFICANT CHANGE UP (ref 7–14)
ANION GAP SERPL CALC-SCNC: 11 MMOL/L — SIGNIFICANT CHANGE UP (ref 5–17)
ANION GAP SERPL CALC-SCNC: 11 MMOL/L — SIGNIFICANT CHANGE UP (ref 7–14)
ANION GAP SERPL CALC-SCNC: 12 MMOL/L — SIGNIFICANT CHANGE UP (ref 7–14)
ANION GAP SERPL CALC-SCNC: 13 MMOL/L — SIGNIFICANT CHANGE UP (ref 5–17)
ANION GAP SERPL CALC-SCNC: 13 MMOL/L — SIGNIFICANT CHANGE UP (ref 7–14)
ANION GAP SERPL CALC-SCNC: 13 MMOL/L — SIGNIFICANT CHANGE UP (ref 7–14)
ANION GAP SERPL CALC-SCNC: 14 MMOL/L — SIGNIFICANT CHANGE UP (ref 7–14)
ANION GAP SERPL CALC-SCNC: 14 MMOL/L — SIGNIFICANT CHANGE UP (ref 7–14)
ANION GAP SERPL CALC-SCNC: 15 MMOL/L — HIGH (ref 7–14)
ANION GAP SERPL CALC-SCNC: 15 MMOL/L — HIGH (ref 7–14)
ANION GAP SERPL CALC-SCNC: 16 MMOL/L — SIGNIFICANT CHANGE UP (ref 5–17)
ANION GAP SERPL CALC-SCNC: 9 MMOL/L — SIGNIFICANT CHANGE UP (ref 7–14)
ANION GAP SERPL CALC-SCNC: 9 MMOL/L — SIGNIFICANT CHANGE UP (ref 7–14)
ANISOCYTOSIS BLD QL: ABNORMAL
APPEARANCE UR: ABNORMAL
APPEARANCE UR: CLEAR — SIGNIFICANT CHANGE UP
APTT BLD: 23.4 SEC — LOW (ref 26.1–36.8)
APTT BLD: 23.5 SEC — LOW (ref 26.1–36.8)
APTT BLD: 24.7 SEC — LOW (ref 26.1–36.8)
APTT BLD: 28.6 SEC — SIGNIFICANT CHANGE UP (ref 26.1–36.8)
AST SERPL-CCNC: 14 U/L — SIGNIFICANT CHANGE UP (ref 4–40)
AST SERPL-CCNC: 17 U/L — SIGNIFICANT CHANGE UP (ref 4–40)
AST SERPL-CCNC: 24 U/L — SIGNIFICANT CHANGE UP (ref 4–40)
AST SERPL-CCNC: 26 U/L — SIGNIFICANT CHANGE UP (ref 10–40)
AST SERPL-CCNC: 27 U/L — SIGNIFICANT CHANGE UP (ref 10–40)
AST SERPL-CCNC: 29 U/L — SIGNIFICANT CHANGE UP (ref 10–40)
AST SERPL-CCNC: 64 U/L — HIGH (ref 4–40)
BACTERIA # UR AUTO: NEGATIVE /HPF — SIGNIFICANT CHANGE UP
BACTERIA # UR AUTO: NEGATIVE /HPF — SIGNIFICANT CHANGE UP
BASOPHILS # BLD AUTO: 0.02 K/UL — SIGNIFICANT CHANGE UP (ref 0–0.2)
BASOPHILS # BLD AUTO: 0.02 K/UL — SIGNIFICANT CHANGE UP (ref 0–0.2)
BASOPHILS # BLD AUTO: 0.03 K/UL — SIGNIFICANT CHANGE UP (ref 0–0.2)
BASOPHILS # BLD AUTO: 0.05 K/UL — SIGNIFICANT CHANGE UP (ref 0–0.2)
BASOPHILS # BLD AUTO: 0.06 K/UL — SIGNIFICANT CHANGE UP (ref 0–0.2)
BASOPHILS # BLD MANUAL: 0 K/UL — SIGNIFICANT CHANGE UP (ref 0–0.2)
BASOPHILS # BLD MANUAL: 0.04 K/UL — SIGNIFICANT CHANGE UP (ref 0–0.2)
BASOPHILS NFR BLD AUTO: 0.2 % — SIGNIFICANT CHANGE UP (ref 0–2)
BASOPHILS NFR BLD AUTO: 0.3 % — SIGNIFICANT CHANGE UP (ref 0–2)
BASOPHILS NFR BLD AUTO: 0.3 % — SIGNIFICANT CHANGE UP (ref 0–2)
BASOPHILS NFR BLD AUTO: 0.4 % — SIGNIFICANT CHANGE UP (ref 0–2)
BASOPHILS NFR BLD AUTO: 1 % — SIGNIFICANT CHANGE UP (ref 0–2)
BASOPHILS NFR BLD AUTO: 1.1 % — SIGNIFICANT CHANGE UP (ref 0–2)
BASOPHILS NFR BLD AUTO: 1.1 % — SIGNIFICANT CHANGE UP (ref 0–2)
BASOPHILS NFR BLD MANUAL: 0 % — SIGNIFICANT CHANGE UP (ref 0–2)
BASOPHILS NFR BLD MANUAL: 0.8 % — SIGNIFICANT CHANGE UP (ref 0–2)
BILIRUB SERPL-MCNC: 0.2 MG/DL — SIGNIFICANT CHANGE UP (ref 0.2–1.2)
BILIRUB SERPL-MCNC: 0.3 MG/DL — SIGNIFICANT CHANGE UP (ref 0.2–1.2)
BILIRUB SERPL-MCNC: 0.6 MG/DL — SIGNIFICANT CHANGE UP (ref 0.2–1.2)
BILIRUB SERPL-MCNC: <0.2 MG/DL — SIGNIFICANT CHANGE UP (ref 0.2–1.2)
BILIRUB UR-MCNC: NEGATIVE — SIGNIFICANT CHANGE UP
BILIRUB UR-MCNC: NEGATIVE — SIGNIFICANT CHANGE UP
BLD GP AB SCN SERPL QL: NEGATIVE — SIGNIFICANT CHANGE UP
BLD GP AB SCN SERPL QL: NEGATIVE — SIGNIFICANT CHANGE UP
BLOOD GAS ARTERIAL COMPREHENSIVE RESULT: SIGNIFICANT CHANGE UP
BLOOD GAS VENOUS COMPREHENSIVE RESULT: SIGNIFICANT CHANGE UP
BUN SERPL-MCNC: 10 MG/DL — SIGNIFICANT CHANGE UP (ref 7–23)
BUN SERPL-MCNC: 11 MG/DL — SIGNIFICANT CHANGE UP (ref 7–23)
BUN SERPL-MCNC: 11 MG/DL — SIGNIFICANT CHANGE UP (ref 7–23)
BUN SERPL-MCNC: 12 MG/DL — SIGNIFICANT CHANGE UP (ref 7–23)
BUN SERPL-MCNC: 14 MG/DL — SIGNIFICANT CHANGE UP (ref 7–23)
BUN SERPL-MCNC: 14 MG/DL — SIGNIFICANT CHANGE UP (ref 7–23)
BUN SERPL-MCNC: 15 MG/DL — SIGNIFICANT CHANGE UP (ref 7–23)
BUN SERPL-MCNC: 16 MG/DL — SIGNIFICANT CHANGE UP (ref 7–23)
BUN SERPL-MCNC: 17 MG/DL — SIGNIFICANT CHANGE UP (ref 7–23)
BUN SERPL-MCNC: 18 MG/DL — SIGNIFICANT CHANGE UP (ref 7–23)
BUN SERPL-MCNC: 18 MG/DL — SIGNIFICANT CHANGE UP (ref 7–23)
BUN SERPL-MCNC: 19 MG/DL — SIGNIFICANT CHANGE UP (ref 7–23)
BUN SERPL-MCNC: 20 MG/DL — SIGNIFICANT CHANGE UP (ref 7–23)
BUN SERPL-MCNC: 20 MG/DL — SIGNIFICANT CHANGE UP (ref 7–23)
BUN SERPL-MCNC: 7 MG/DL — SIGNIFICANT CHANGE UP (ref 7–23)
BUN SERPL-MCNC: 8 MG/DL — SIGNIFICANT CHANGE UP (ref 7–23)
BUN SERPL-MCNC: 9 MG/DL — SIGNIFICANT CHANGE UP (ref 7–23)
CA-I BLD-SCNC: 1.13 MMOL/L — LOW (ref 1.15–1.29)
CALCIUM SERPL-MCNC: 8.1 MG/DL — LOW (ref 8.4–10.5)
CALCIUM SERPL-MCNC: 8.3 MG/DL — LOW (ref 8.4–10.5)
CALCIUM SERPL-MCNC: 8.4 MG/DL — SIGNIFICANT CHANGE UP (ref 8.4–10.5)
CALCIUM SERPL-MCNC: 8.4 MG/DL — SIGNIFICANT CHANGE UP (ref 8.4–10.5)
CALCIUM SERPL-MCNC: 8.5 MG/DL — SIGNIFICANT CHANGE UP (ref 8.4–10.5)
CALCIUM SERPL-MCNC: 8.6 MG/DL — SIGNIFICANT CHANGE UP (ref 8.4–10.5)
CALCIUM SERPL-MCNC: 8.6 MG/DL — SIGNIFICANT CHANGE UP (ref 8.4–10.5)
CALCIUM SERPL-MCNC: 8.7 MG/DL — SIGNIFICANT CHANGE UP (ref 8.4–10.5)
CALCIUM SERPL-MCNC: 8.8 MG/DL — SIGNIFICANT CHANGE UP (ref 8.4–10.5)
CALCIUM SERPL-MCNC: 8.9 MG/DL — SIGNIFICANT CHANGE UP (ref 8.4–10.5)
CALCIUM SERPL-MCNC: 8.9 MG/DL — SIGNIFICANT CHANGE UP (ref 8.4–10.5)
CALCIUM SERPL-MCNC: 9.1 MG/DL — SIGNIFICANT CHANGE UP (ref 8.4–10.5)
CALCIUM SERPL-MCNC: 9.3 MG/DL — SIGNIFICANT CHANGE UP (ref 8.4–10.5)
CALCIUM SERPL-MCNC: 9.5 MG/DL — SIGNIFICANT CHANGE UP (ref 8.4–10.5)
CALCIUM SERPL-MCNC: 9.5 MG/DL — SIGNIFICANT CHANGE UP (ref 8.4–10.5)
CALCIUM SERPL-MCNC: 9.9 MG/DL — SIGNIFICANT CHANGE UP (ref 8.4–10.5)
CALCIUM SERPL-MCNC: 9.9 MG/DL — SIGNIFICANT CHANGE UP (ref 8.4–10.5)
CAST: 1 /LPF — SIGNIFICANT CHANGE UP (ref 0–4)
CAST: 3 /LPF — SIGNIFICANT CHANGE UP (ref 0–4)
CHLORIDE SERPL-SCNC: 100 MMOL/L — SIGNIFICANT CHANGE UP (ref 98–107)
CHLORIDE SERPL-SCNC: 85 MMOL/L — LOW (ref 98–107)
CHLORIDE SERPL-SCNC: 93 MMOL/L — LOW (ref 96–108)
CHLORIDE SERPL-SCNC: 93 MMOL/L — LOW (ref 96–108)
CHLORIDE SERPL-SCNC: 93 MMOL/L — LOW (ref 98–107)
CHLORIDE SERPL-SCNC: 94 MMOL/L — LOW (ref 96–108)
CHLORIDE SERPL-SCNC: 94 MMOL/L — LOW (ref 98–107)
CHLORIDE SERPL-SCNC: 95 MMOL/L — LOW (ref 98–107)
CHLORIDE SERPL-SCNC: 96 MMOL/L — LOW (ref 98–107)
CHLORIDE SERPL-SCNC: 97 MMOL/L — LOW (ref 98–107)
CHLORIDE SERPL-SCNC: 97 MMOL/L — SIGNIFICANT CHANGE UP (ref 96–108)
CHLORIDE SERPL-SCNC: 99 MMOL/L — SIGNIFICANT CHANGE UP (ref 98–107)
CHLORIDE SERPL-SCNC: 99 MMOL/L — SIGNIFICANT CHANGE UP (ref 98–107)
CK MB BLD-MCNC: 2.4 % — SIGNIFICANT CHANGE UP (ref 0–2.5)
CK MB BLD-MCNC: 3.9 % — HIGH (ref 0–2.5)
CK MB CFR SERPL CALC: 3.6 NG/ML — SIGNIFICANT CHANGE UP
CK MB CFR SERPL CALC: 4.4 NG/ML — SIGNIFICANT CHANGE UP
CK SERPL-CCNC: 187 U/L — SIGNIFICANT CHANGE UP (ref 30–200)
CK SERPL-CCNC: 93 U/L — SIGNIFICANT CHANGE UP (ref 30–200)
CO2 SERPL-SCNC: 23 MMOL/L — SIGNIFICANT CHANGE UP (ref 22–31)
CO2 SERPL-SCNC: 24 MMOL/L — SIGNIFICANT CHANGE UP (ref 22–31)
CO2 SERPL-SCNC: 24 MMOL/L — SIGNIFICANT CHANGE UP (ref 22–31)
CO2 SERPL-SCNC: 25 MMOL/L — SIGNIFICANT CHANGE UP (ref 22–31)
CO2 SERPL-SCNC: 26 MMOL/L — SIGNIFICANT CHANGE UP (ref 22–31)
CO2 SERPL-SCNC: 27 MMOL/L — SIGNIFICANT CHANGE UP (ref 22–31)
CO2 SERPL-SCNC: 28 MMOL/L — SIGNIFICANT CHANGE UP (ref 22–31)
CO2 SERPL-SCNC: 29 MMOL/L — SIGNIFICANT CHANGE UP (ref 22–31)
CO2 SERPL-SCNC: 30 MMOL/L — SIGNIFICANT CHANGE UP (ref 22–31)
CO2 SERPL-SCNC: 31 MMOL/L — SIGNIFICANT CHANGE UP (ref 22–31)
COLOR SPEC: YELLOW — SIGNIFICANT CHANGE UP
COLOR SPEC: YELLOW — SIGNIFICANT CHANGE UP
CREAT SERPL-MCNC: 0.62 MG/DL — SIGNIFICANT CHANGE UP (ref 0.5–1.3)
CREAT SERPL-MCNC: 0.74 MG/DL — SIGNIFICANT CHANGE UP (ref 0.5–1.3)
CREAT SERPL-MCNC: 0.76 MG/DL — SIGNIFICANT CHANGE UP (ref 0.5–1.3)
CREAT SERPL-MCNC: 0.77 MG/DL — SIGNIFICANT CHANGE UP (ref 0.5–1.3)
CREAT SERPL-MCNC: 0.77 MG/DL — SIGNIFICANT CHANGE UP (ref 0.5–1.3)
CREAT SERPL-MCNC: 0.78 MG/DL — SIGNIFICANT CHANGE UP (ref 0.5–1.3)
CREAT SERPL-MCNC: 0.79 MG/DL — SIGNIFICANT CHANGE UP (ref 0.5–1.3)
CREAT SERPL-MCNC: 0.8 MG/DL — SIGNIFICANT CHANGE UP (ref 0.5–1.3)
CREAT SERPL-MCNC: 0.8 MG/DL — SIGNIFICANT CHANGE UP (ref 0.5–1.3)
CREAT SERPL-MCNC: 0.81 MG/DL — SIGNIFICANT CHANGE UP (ref 0.5–1.3)
CREAT SERPL-MCNC: 0.82 MG/DL — SIGNIFICANT CHANGE UP (ref 0.5–1.3)
CREAT SERPL-MCNC: 0.84 MG/DL — SIGNIFICANT CHANGE UP (ref 0.5–1.3)
CREAT SERPL-MCNC: 0.93 MG/DL — SIGNIFICANT CHANGE UP (ref 0.5–1.3)
CREAT SERPL-MCNC: 0.94 MG/DL — SIGNIFICANT CHANGE UP (ref 0.5–1.3)
CREAT SERPL-MCNC: 0.96 MG/DL — SIGNIFICANT CHANGE UP (ref 0.5–1.3)
CREAT SERPL-MCNC: 1 MG/DL — SIGNIFICANT CHANGE UP (ref 0.5–1.3)
CULTURE RESULTS: ABNORMAL
CULTURE RESULTS: ABNORMAL
CULTURE RESULTS: SIGNIFICANT CHANGE UP
DIFF PNL FLD: ABNORMAL
DIFF PNL FLD: NEGATIVE — SIGNIFICANT CHANGE UP
E COLI DNA BLD POS QL NAA+NON-PROBE: SIGNIFICANT CHANGE UP
EGFR: 101 ML/MIN/1.73M2 — SIGNIFICANT CHANGE UP
EGFR: 101 ML/MIN/1.73M2 — SIGNIFICANT CHANGE UP
EGFR: 80 ML/MIN/1.73M2 — SIGNIFICANT CHANGE UP
EGFR: 80 ML/MIN/1.73M2 — SIGNIFICANT CHANGE UP
EGFR: 83 ML/MIN/1.73M2 — SIGNIFICANT CHANGE UP
EGFR: 83 ML/MIN/1.73M2 — SIGNIFICANT CHANGE UP
EGFR: 86 ML/MIN/1.73M2 — SIGNIFICANT CHANGE UP
EGFR: 86 ML/MIN/1.73M2 — SIGNIFICANT CHANGE UP
EGFR: 87 ML/MIN/1.73M2 — SIGNIFICANT CHANGE UP
EGFR: 87 ML/MIN/1.73M2 — SIGNIFICANT CHANGE UP
EGFR: 92 ML/MIN/1.73M2 — SIGNIFICANT CHANGE UP
EGFR: 92 ML/MIN/1.73M2 — SIGNIFICANT CHANGE UP
EGFR: 93 ML/MIN/1.73M2 — SIGNIFICANT CHANGE UP
EGFR: 94 ML/MIN/1.73M2 — SIGNIFICANT CHANGE UP
EGFR: 95 ML/MIN/1.73M2 — SIGNIFICANT CHANGE UP
EGFR: 96 ML/MIN/1.73M2 — SIGNIFICANT CHANGE UP
EGFR: 96 ML/MIN/1.73M2 — SIGNIFICANT CHANGE UP
EOSINOPHIL # BLD AUTO: 0.01 K/UL — SIGNIFICANT CHANGE UP (ref 0–0.5)
EOSINOPHIL # BLD AUTO: 0.01 K/UL — SIGNIFICANT CHANGE UP (ref 0–0.5)
EOSINOPHIL # BLD AUTO: 0.04 K/UL — SIGNIFICANT CHANGE UP (ref 0–0.5)
EOSINOPHIL # BLD AUTO: 0.05 K/UL — SIGNIFICANT CHANGE UP (ref 0–0.5)
EOSINOPHIL # BLD AUTO: 0.06 K/UL — SIGNIFICANT CHANGE UP (ref 0–0.5)
EOSINOPHIL # BLD AUTO: 0.09 K/UL — SIGNIFICANT CHANGE UP (ref 0–0.5)
EOSINOPHIL # BLD AUTO: 0.11 K/UL — SIGNIFICANT CHANGE UP (ref 0–0.5)
EOSINOPHIL # BLD MANUAL: 0 K/UL — SIGNIFICANT CHANGE UP (ref 0–0.5)
EOSINOPHIL # BLD MANUAL: 0 K/UL — SIGNIFICANT CHANGE UP (ref 0–0.5)
EOSINOPHIL NFR BLD AUTO: 0.1 % — SIGNIFICANT CHANGE UP (ref 0–6)
EOSINOPHIL NFR BLD AUTO: 0.2 % — SIGNIFICANT CHANGE UP (ref 0–6)
EOSINOPHIL NFR BLD AUTO: 0.5 % — SIGNIFICANT CHANGE UP (ref 0–6)
EOSINOPHIL NFR BLD AUTO: 1 % — SIGNIFICANT CHANGE UP (ref 0–6)
EOSINOPHIL NFR BLD AUTO: 1.1 % — SIGNIFICANT CHANGE UP (ref 0–6)
EOSINOPHIL NFR BLD AUTO: 1.3 % — SIGNIFICANT CHANGE UP (ref 0–6)
EOSINOPHIL NFR BLD AUTO: 1.5 % — SIGNIFICANT CHANGE UP (ref 0–6)
EOSINOPHIL NFR BLD MANUAL: 0 % — SIGNIFICANT CHANGE UP (ref 0–6)
EOSINOPHIL NFR BLD MANUAL: 0 % — SIGNIFICANT CHANGE UP (ref 0–6)
FERRITIN SERPL-MCNC: 690 NG/ML — HIGH (ref 30–400)
FLUAV AG NPH QL: SIGNIFICANT CHANGE UP
FLUBV AG NPH QL: SIGNIFICANT CHANGE UP
FOLATE SERPL-MCNC: 13.5 NG/ML — SIGNIFICANT CHANGE UP
GAS PNL BLDV: SIGNIFICANT CHANGE UP
GIANT PLATELETS BLD QL SMEAR: PRESENT
GIANT PLATELETS BLD QL SMEAR: PRESENT
GLUCOSE BLDC GLUCOMTR-MCNC: 124 MG/DL — HIGH (ref 70–99)
GLUCOSE BLDC GLUCOMTR-MCNC: 192 MG/DL — HIGH (ref 70–99)
GLUCOSE BLDC GLUCOMTR-MCNC: 220 MG/DL — HIGH (ref 70–99)
GLUCOSE BLDC GLUCOMTR-MCNC: 270 MG/DL — HIGH (ref 70–99)
GLUCOSE BLDC GLUCOMTR-MCNC: 283 MG/DL — HIGH (ref 70–99)
GLUCOSE SERPL-MCNC: 100 MG/DL — HIGH (ref 70–99)
GLUCOSE SERPL-MCNC: 102 MG/DL — HIGH (ref 70–99)
GLUCOSE SERPL-MCNC: 108 MG/DL — HIGH (ref 70–99)
GLUCOSE SERPL-MCNC: 109 MG/DL — HIGH (ref 70–99)
GLUCOSE SERPL-MCNC: 109 MG/DL — HIGH (ref 70–99)
GLUCOSE SERPL-MCNC: 110 MG/DL — HIGH (ref 70–99)
GLUCOSE SERPL-MCNC: 111 MG/DL — HIGH (ref 70–99)
GLUCOSE SERPL-MCNC: 118 MG/DL — HIGH (ref 70–99)
GLUCOSE SERPL-MCNC: 125 MG/DL — HIGH (ref 70–99)
GLUCOSE SERPL-MCNC: 132 MG/DL — HIGH (ref 70–99)
GLUCOSE SERPL-MCNC: 134 MG/DL — HIGH (ref 70–99)
GLUCOSE SERPL-MCNC: 137 MG/DL — HIGH (ref 70–99)
GLUCOSE SERPL-MCNC: 147 MG/DL — HIGH (ref 70–99)
GLUCOSE SERPL-MCNC: 147 MG/DL — HIGH (ref 70–99)
GLUCOSE SERPL-MCNC: 151 MG/DL — HIGH (ref 70–99)
GLUCOSE SERPL-MCNC: 152 MG/DL — HIGH (ref 70–99)
GLUCOSE SERPL-MCNC: 160 MG/DL — HIGH (ref 70–99)
GLUCOSE SERPL-MCNC: 160 MG/DL — HIGH (ref 70–99)
GLUCOSE SERPL-MCNC: 187 MG/DL — HIGH (ref 70–99)
GLUCOSE SERPL-MCNC: 88 MG/DL — SIGNIFICANT CHANGE UP (ref 70–99)
GLUCOSE SERPL-MCNC: 94 MG/DL — SIGNIFICANT CHANGE UP (ref 70–99)
GLUCOSE UR QL: NEGATIVE MG/DL — SIGNIFICANT CHANGE UP
GLUCOSE UR QL: NEGATIVE MG/DL — SIGNIFICANT CHANGE UP
GRAM STN FLD: ABNORMAL
GRAM STN FLD: ABNORMAL
HCT VFR BLD CALC: 25 % — LOW (ref 39–50)
HCT VFR BLD CALC: 26.6 % — LOW (ref 39–50)
HCT VFR BLD CALC: 26.7 % — LOW (ref 39–50)
HCT VFR BLD CALC: 26.9 % — LOW (ref 39–50)
HCT VFR BLD CALC: 27 % — LOW (ref 39–50)
HCT VFR BLD CALC: 27.4 % — LOW (ref 39–50)
HCT VFR BLD CALC: 27.9 % — LOW (ref 39–50)
HCT VFR BLD CALC: 28.1 % — LOW (ref 39–50)
HCT VFR BLD CALC: 28.4 % — LOW (ref 39–50)
HCT VFR BLD CALC: 28.7 % — LOW (ref 39–50)
HCT VFR BLD CALC: 29.3 % — LOW (ref 39–50)
HCT VFR BLD CALC: 29.9 % — LOW (ref 39–50)
HCT VFR BLD CALC: 30 % — LOW (ref 39–50)
HCT VFR BLD CALC: 30.2 % — LOW (ref 39–50)
HCT VFR BLD CALC: 30.5 % — LOW (ref 39–50)
HCT VFR BLD CALC: 32.5 % — LOW (ref 39–50)
HCT VFR BLD CALC: 32.9 % — LOW (ref 39–50)
HCT VFR BLD CALC: 33.4 % — LOW (ref 39–50)
HCT VFR BLD CALC: 34.1 % — LOW (ref 39–50)
HGB BLD-MCNC: 10 G/DL — LOW (ref 13–17)
HGB BLD-MCNC: 10 G/DL — LOW (ref 13–17)
HGB BLD-MCNC: 10.5 G/DL — LOW (ref 13–17)
HGB BLD-MCNC: 10.8 G/DL — LOW (ref 13–17)
HGB BLD-MCNC: 11.2 G/DL — LOW (ref 13–17)
HGB BLD-MCNC: 11.2 G/DL — LOW (ref 13–17)
HGB BLD-MCNC: 8.2 G/DL — LOW (ref 13–17)
HGB BLD-MCNC: 8.3 G/DL — LOW (ref 13–17)
HGB BLD-MCNC: 8.8 G/DL — LOW (ref 13–17)
HGB BLD-MCNC: 8.9 G/DL — LOW (ref 13–17)
HGB BLD-MCNC: 8.9 G/DL — LOW (ref 13–17)
HGB BLD-MCNC: 9 G/DL — LOW (ref 13–17)
HGB BLD-MCNC: 9.3 G/DL — LOW (ref 13–17)
HGB BLD-MCNC: 9.4 G/DL — LOW (ref 13–17)
HGB BLD-MCNC: 9.4 G/DL — LOW (ref 13–17)
HGB BLD-MCNC: 9.5 G/DL — LOW (ref 13–17)
HGB BLD-MCNC: 9.5 G/DL — LOW (ref 13–17)
HGB BLD-MCNC: 9.6 G/DL — LOW (ref 13–17)
HGB BLD-MCNC: 9.9 G/DL — LOW (ref 13–17)
IANC: 5.11 K/UL — SIGNIFICANT CHANGE UP (ref 1.8–7.4)
IMM GRANULOCYTES # BLD AUTO: 0.05 K/UL — SIGNIFICANT CHANGE UP (ref 0–0.07)
IMM GRANULOCYTES # BLD AUTO: 0.07 K/UL — SIGNIFICANT CHANGE UP (ref 0–0.07)
IMM GRANULOCYTES NFR BLD AUTO: 0.5 % — SIGNIFICANT CHANGE UP (ref 0–0.9)
IMM GRANULOCYTES NFR BLD AUTO: 0.5 % — SIGNIFICANT CHANGE UP (ref 0–0.9)
IMM GRANULOCYTES NFR BLD AUTO: 0.6 % — SIGNIFICANT CHANGE UP (ref 0–0.9)
IMM GRANULOCYTES NFR BLD AUTO: 0.7 % — SIGNIFICANT CHANGE UP (ref 0–0.9)
IMM GRANULOCYTES NFR BLD AUTO: 0.9 % — SIGNIFICANT CHANGE UP (ref 0–0.9)
IMM GRANULOCYTES NFR BLD AUTO: 1 % — HIGH (ref 0–0.9)
IMM GRANULOCYTES NFR BLD AUTO: 1.1 % — HIGH (ref 0–0.9)
INR BLD: 0.97 RATIO — SIGNIFICANT CHANGE UP (ref 0.85–1.16)
INR BLD: 0.98 RATIO — SIGNIFICANT CHANGE UP (ref 0.85–1.16)
INR BLD: 1.08 RATIO — SIGNIFICANT CHANGE UP (ref 0.85–1.16)
INR BLD: 1.12 RATIO — SIGNIFICANT CHANGE UP (ref 0.85–1.16)
IRON SATN MFR SERPL: 25 % — SIGNIFICANT CHANGE UP (ref 16–55)
IRON SATN MFR SERPL: 61 UG/DL — SIGNIFICANT CHANGE UP (ref 45–165)
KETONES UR QL: NEGATIVE MG/DL — SIGNIFICANT CHANGE UP
KETONES UR QL: NEGATIVE MG/DL — SIGNIFICANT CHANGE UP
LACTATE SERPL-SCNC: 1.6 MMOL/L — SIGNIFICANT CHANGE UP (ref 0.5–2)
LEUKOCYTE ESTERASE UR-ACNC: NEGATIVE — SIGNIFICANT CHANGE UP
LEUKOCYTE ESTERASE UR-ACNC: NEGATIVE — SIGNIFICANT CHANGE UP
LYMPHOCYTES # BLD AUTO: 0.24 K/UL — LOW (ref 1–3.3)
LYMPHOCYTES # BLD AUTO: 0.42 K/UL — LOW (ref 1–3.3)
LYMPHOCYTES # BLD AUTO: 0.61 K/UL — LOW (ref 1–3.3)
LYMPHOCYTES # BLD AUTO: 0.71 K/UL — LOW (ref 1–3.3)
LYMPHOCYTES # BLD AUTO: 0.81 K/UL — LOW (ref 1–3.3)
LYMPHOCYTES # BLD AUTO: 0.87 K/UL — LOW (ref 1–3.3)
LYMPHOCYTES # BLD AUTO: 0.93 K/UL — LOW (ref 1–3.3)
LYMPHOCYTES # BLD AUTO: 11.2 % — LOW (ref 13–44)
LYMPHOCYTES # BLD AUTO: 12.8 % — LOW (ref 13–44)
LYMPHOCYTES # BLD AUTO: 14 % — SIGNIFICANT CHANGE UP (ref 13–44)
LYMPHOCYTES # BLD AUTO: 8.4 % — LOW (ref 13–44)
LYMPHOCYTES # BLD AUTO: 9.6 % — LOW (ref 13–44)
LYMPHOCYTES # BLD MANUAL: 0.23 K/UL — LOW (ref 1–3.3)
LYMPHOCYTES # BLD MANUAL: 1.12 K/UL — SIGNIFICANT CHANGE UP (ref 1–3.3)
LYMPHOCYTES NFR BLD AUTO: 15.3 % — SIGNIFICANT CHANGE UP (ref 13–44)
LYMPHOCYTES NFR BLD AUTO: 3.5 % — LOW (ref 13–44)
LYMPHOCYTES NFR BLD MANUAL: 24 % — SIGNIFICANT CHANGE UP (ref 13–44)
LYMPHOCYTES NFR BLD MANUAL: 3.4 % — LOW (ref 13–44)
MACROCYTES BLD QL: ABNORMAL
MAGNESIUM SERPL-MCNC: 1.5 MG/DL — LOW (ref 1.6–2.6)
MAGNESIUM SERPL-MCNC: 1.6 MG/DL — SIGNIFICANT CHANGE UP (ref 1.6–2.6)
MAGNESIUM SERPL-MCNC: 1.7 MG/DL — SIGNIFICANT CHANGE UP (ref 1.6–2.6)
MAGNESIUM SERPL-MCNC: 1.7 MG/DL — SIGNIFICANT CHANGE UP (ref 1.6–2.6)
MAGNESIUM SERPL-MCNC: 1.8 MG/DL — SIGNIFICANT CHANGE UP (ref 1.6–2.6)
MAGNESIUM SERPL-MCNC: 1.9 MG/DL — SIGNIFICANT CHANGE UP (ref 1.6–2.6)
MAGNESIUM SERPL-MCNC: 2 MG/DL — SIGNIFICANT CHANGE UP (ref 1.6–2.6)
MAGNESIUM SERPL-MCNC: 2 MG/DL — SIGNIFICANT CHANGE UP (ref 1.6–2.6)
MAGNESIUM SERPL-MCNC: 2.2 MG/DL — SIGNIFICANT CHANGE UP (ref 1.6–2.6)
MAGNESIUM SERPL-MCNC: 2.2 MG/DL — SIGNIFICANT CHANGE UP (ref 1.6–2.6)
MANUAL METAMYELOCYTE #: 0.04 K/UL — HIGH (ref 0–0)
MANUAL MYELOCYTE #: 0.04 K/UL — HIGH (ref 0–0)
MANUAL NEUTROPHIL BANDS #: 0.3 K/UL — SIGNIFICANT CHANGE UP (ref 0–0.84)
MANUAL NEUTROPHIL BANDS #: 0.81 K/UL — SIGNIFICANT CHANGE UP (ref 0–0.84)
MANUAL NRBC #: 0.05 K/UL — HIGH (ref 0–0)
MCHC RBC-ENTMCNC: 30.5 G/DL — LOW (ref 32–36)
MCHC RBC-ENTMCNC: 31.4 PG — SIGNIFICANT CHANGE UP (ref 27–34)
MCHC RBC-ENTMCNC: 31.5 PG — SIGNIFICANT CHANGE UP (ref 27–34)
MCHC RBC-ENTMCNC: 32 G/DL — SIGNIFICANT CHANGE UP (ref 32–36)
MCHC RBC-ENTMCNC: 32 PG — SIGNIFICANT CHANGE UP (ref 27–34)
MCHC RBC-ENTMCNC: 32.1 G/DL — SIGNIFICANT CHANGE UP (ref 32–36)
MCHC RBC-ENTMCNC: 32.1 PG — SIGNIFICANT CHANGE UP (ref 27–34)
MCHC RBC-ENTMCNC: 32.2 PG — SIGNIFICANT CHANGE UP (ref 27–34)
MCHC RBC-ENTMCNC: 32.3 G/DL — SIGNIFICANT CHANGE UP (ref 32–36)
MCHC RBC-ENTMCNC: 32.3 PG — SIGNIFICANT CHANGE UP (ref 27–34)
MCHC RBC-ENTMCNC: 32.4 PG — SIGNIFICANT CHANGE UP (ref 27–34)
MCHC RBC-ENTMCNC: 32.6 PG — SIGNIFICANT CHANGE UP (ref 27–34)
MCHC RBC-ENTMCNC: 32.7 PG — SIGNIFICANT CHANGE UP (ref 27–34)
MCHC RBC-ENTMCNC: 32.7 PG — SIGNIFICANT CHANGE UP (ref 27–34)
MCHC RBC-ENTMCNC: 32.8 G/DL — SIGNIFICANT CHANGE UP (ref 32–36)
MCHC RBC-ENTMCNC: 32.8 PG — SIGNIFICANT CHANGE UP (ref 27–34)
MCHC RBC-ENTMCNC: 32.9 PG — SIGNIFICANT CHANGE UP (ref 27–34)
MCHC RBC-ENTMCNC: 33 G/DL — SIGNIFICANT CHANGE UP (ref 32–36)
MCHC RBC-ENTMCNC: 33 G/DL — SIGNIFICANT CHANGE UP (ref 32–36)
MCHC RBC-ENTMCNC: 33 PG — SIGNIFICANT CHANGE UP (ref 27–34)
MCHC RBC-ENTMCNC: 33.1 G/DL — SIGNIFICANT CHANGE UP (ref 32–36)
MCHC RBC-ENTMCNC: 33.2 G/DL — SIGNIFICANT CHANGE UP (ref 32–36)
MCHC RBC-ENTMCNC: 33.3 G/DL — SIGNIFICANT CHANGE UP (ref 32–36)
MCHC RBC-ENTMCNC: 33.4 G/DL — SIGNIFICANT CHANGE UP (ref 32–36)
MCHC RBC-ENTMCNC: 33.5 G/DL — SIGNIFICANT CHANGE UP (ref 32–36)
MCHC RBC-ENTMCNC: 33.6 PG — SIGNIFICANT CHANGE UP (ref 27–34)
MCV RBC AUTO: 100.7 FL — HIGH (ref 80–100)
MCV RBC AUTO: 100.7 FL — HIGH (ref 80–100)
MCV RBC AUTO: 101.2 FL — HIGH (ref 80–100)
MCV RBC AUTO: 106.3 FL — HIGH (ref 80–100)
MCV RBC AUTO: 96.2 FL — SIGNIFICANT CHANGE UP (ref 80–100)
MCV RBC AUTO: 96.4 FL — SIGNIFICANT CHANGE UP (ref 80–100)
MCV RBC AUTO: 96.6 FL — SIGNIFICANT CHANGE UP (ref 80–100)
MCV RBC AUTO: 96.6 FL — SIGNIFICANT CHANGE UP (ref 80–100)
MCV RBC AUTO: 96.8 FL — SIGNIFICANT CHANGE UP (ref 80–100)
MCV RBC AUTO: 97.8 FL — SIGNIFICANT CHANGE UP (ref 80–100)
MCV RBC AUTO: 98 FL — SIGNIFICANT CHANGE UP (ref 80–100)
MCV RBC AUTO: 98.1 FL — SIGNIFICANT CHANGE UP (ref 80–100)
MCV RBC AUTO: 98.2 FL — SIGNIFICANT CHANGE UP (ref 80–100)
MCV RBC AUTO: 98.4 FL — SIGNIFICANT CHANGE UP (ref 80–100)
MCV RBC AUTO: 98.6 FL — SIGNIFICANT CHANGE UP (ref 80–100)
MCV RBC AUTO: 98.8 FL — SIGNIFICANT CHANGE UP (ref 80–100)
MCV RBC AUTO: 98.8 FL — SIGNIFICANT CHANGE UP (ref 80–100)
MCV RBC AUTO: 99.3 FL — SIGNIFICANT CHANGE UP (ref 80–100)
MCV RBC AUTO: 99.3 FL — SIGNIFICANT CHANGE UP (ref 80–100)
METAMYELOCYTES # FLD: 0.8 % — HIGH (ref 0–0)
METAMYELOCYTES NFR BLD: 0.8 % — HIGH (ref 0–0)
METHOD TYPE: SIGNIFICANT CHANGE UP
MONOCYTES # BLD AUTO: 0.15 K/UL — SIGNIFICANT CHANGE UP (ref 0–0.9)
MONOCYTES # BLD AUTO: 0.27 K/UL — SIGNIFICANT CHANGE UP (ref 0–0.9)
MONOCYTES # BLD AUTO: 0.51 K/UL — SIGNIFICANT CHANGE UP (ref 0–0.9)
MONOCYTES # BLD AUTO: 0.75 K/UL — SIGNIFICANT CHANGE UP (ref 0–0.9)
MONOCYTES # BLD AUTO: 0.88 K/UL — SIGNIFICANT CHANGE UP (ref 0–0.9)
MONOCYTES # BLD AUTO: 0.95 K/UL — HIGH (ref 0–0.9)
MONOCYTES # BLD AUTO: 1.06 K/UL — HIGH (ref 0–0.9)
MONOCYTES # BLD MANUAL: 0.06 K/UL — SIGNIFICANT CHANGE UP (ref 0–0.9)
MONOCYTES # BLD MANUAL: 0.85 K/UL — SIGNIFICANT CHANGE UP (ref 0–0.9)
MONOCYTES NFR BLD AUTO: 14.6 % — HIGH (ref 2–14)
MONOCYTES NFR BLD AUTO: 2.2 % — SIGNIFICANT CHANGE UP (ref 2–14)
MONOCYTES NFR BLD AUTO: 20.4 % — HIGH (ref 2–14)
MONOCYTES NFR BLD AUTO: 8.3 % — SIGNIFICANT CHANGE UP (ref 2–14)
MONOCYTES NFR BLD AUTO: 9 % — SIGNIFICANT CHANGE UP (ref 2–14)
MONOCYTES NFR BLD AUTO: 9.1 % — SIGNIFICANT CHANGE UP (ref 2–14)
MONOCYTES NFR BLD AUTO: 9.3 % — SIGNIFICANT CHANGE UP (ref 2–14)
MONOCYTES NFR BLD MANUAL: 0.9 % — LOW (ref 2–14)
MONOCYTES NFR BLD MANUAL: 18.2 % — HIGH (ref 2–14)
MRSA PCR RESULT.: SIGNIFICANT CHANGE UP
MYELOCYTES NFR BLD: 0.8 % — HIGH (ref 0–0)
NEUTROPHILS # BLD AUTO: 2.21 K/UL — SIGNIFICANT CHANGE UP (ref 1.8–7.4)
NEUTROPHILS # BLD AUTO: 2.88 K/UL — SIGNIFICANT CHANGE UP (ref 1.8–7.4)
NEUTROPHILS # BLD AUTO: 4.18 K/UL — SIGNIFICANT CHANGE UP (ref 1.8–7.4)
NEUTROPHILS # BLD AUTO: 5.11 K/UL — SIGNIFICANT CHANGE UP (ref 1.8–7.4)
NEUTROPHILS # BLD AUTO: 6.39 K/UL — SIGNIFICANT CHANGE UP (ref 1.8–7.4)
NEUTROPHILS # BLD AUTO: 7.24 K/UL — SIGNIFICANT CHANGE UP (ref 1.8–7.4)
NEUTROPHILS # BLD AUTO: 7.88 K/UL — HIGH (ref 1.8–7.4)
NEUTROPHILS # BLD MANUAL: 1.77 K/UL — LOW (ref 1.8–7.4)
NEUTROPHILS # BLD MANUAL: 6.29 K/UL — SIGNIFICANT CHANGE UP (ref 1.8–7.4)
NEUTROPHILS NFR BLD AUTO: 61.9 % — SIGNIFICANT CHANGE UP (ref 43–77)
NEUTROPHILS NFR BLD AUTO: 70.2 % — SIGNIFICANT CHANGE UP (ref 43–77)
NEUTROPHILS NFR BLD AUTO: 74 % — SIGNIFICANT CHANGE UP (ref 43–77)
NEUTROPHILS NFR BLD AUTO: 76.4 % — SIGNIFICANT CHANGE UP (ref 43–77)
NEUTROPHILS NFR BLD AUTO: 80.3 % — HIGH (ref 43–77)
NEUTROPHILS NFR BLD AUTO: 81.2 % — HIGH (ref 43–77)
NEUTROPHILS NFR BLD AUTO: 92.9 % — HIGH (ref 43–77)
NEUTROPHILS NFR BLD MANUAL: 38 % — LOW (ref 43–77)
NEUTROPHILS NFR BLD MANUAL: 91.4 % — HIGH (ref 43–77)
NEUTS BAND # BLD: 17.4 % — CRITICAL HIGH (ref 0–8)
NEUTS BAND # BLD: 4.3 % — SIGNIFICANT CHANGE UP (ref 0–8)
NEUTS BAND NFR BLD: 17.4 % — CRITICAL HIGH (ref 0–8)
NEUTS BAND NFR BLD: 4.3 % — SIGNIFICANT CHANGE UP (ref 0–8)
NITRITE UR-MCNC: NEGATIVE — SIGNIFICANT CHANGE UP
NITRITE UR-MCNC: NEGATIVE — SIGNIFICANT CHANGE UP
NRBC # BLD AUTO: 0 K/UL — SIGNIFICANT CHANGE UP (ref 0–0)
NRBC # BLD AUTO: 0.02 K/UL — HIGH (ref 0–0)
NRBC # BLD: 1 /100 WBCS — HIGH (ref 0–0)
NRBC # FLD: 0 K/UL — SIGNIFICANT CHANGE UP (ref 0–0)
NRBC # FLD: 0.02 K/UL — HIGH (ref 0–0)
NRBC BLD AUTO-RTO: 0 /100 WBCS — SIGNIFICANT CHANGE UP (ref 0–0)
NRBC BLD-RTO: 1 /100 WBCS — HIGH (ref 0–0)
ORGANISM # SPEC MICROSCOPIC CNT: ABNORMAL
PH UR: 6.5 — SIGNIFICANT CHANGE UP (ref 5–8)
PH UR: 7.5 — SIGNIFICANT CHANGE UP (ref 5–8)
PHOSPHATE SERPL-MCNC: 1.6 MG/DL — LOW (ref 2.5–4.5)
PHOSPHATE SERPL-MCNC: 2.1 MG/DL — LOW (ref 2.5–4.5)
PHOSPHATE SERPL-MCNC: 2.3 MG/DL — LOW (ref 2.5–4.5)
PHOSPHATE SERPL-MCNC: 2.4 MG/DL — LOW (ref 2.5–4.5)
PHOSPHATE SERPL-MCNC: 2.4 MG/DL — LOW (ref 2.5–4.5)
PHOSPHATE SERPL-MCNC: 2.5 MG/DL — SIGNIFICANT CHANGE UP (ref 2.5–4.5)
PHOSPHATE SERPL-MCNC: 2.6 MG/DL — SIGNIFICANT CHANGE UP (ref 2.5–4.5)
PHOSPHATE SERPL-MCNC: 2.7 MG/DL — SIGNIFICANT CHANGE UP (ref 2.5–4.5)
PHOSPHATE SERPL-MCNC: 2.8 MG/DL — SIGNIFICANT CHANGE UP (ref 2.5–4.5)
PHOSPHATE SERPL-MCNC: 2.9 MG/DL — SIGNIFICANT CHANGE UP (ref 2.5–4.5)
PHOSPHATE SERPL-MCNC: 2.9 MG/DL — SIGNIFICANT CHANGE UP (ref 2.5–4.5)
PHOSPHATE SERPL-MCNC: 3 MG/DL — SIGNIFICANT CHANGE UP (ref 2.5–4.5)
PHOSPHATE SERPL-MCNC: 3.1 MG/DL — SIGNIFICANT CHANGE UP (ref 2.5–4.5)
PHOSPHATE SERPL-MCNC: 3.1 MG/DL — SIGNIFICANT CHANGE UP (ref 2.5–4.5)
PLAT MORPH BLD: ABNORMAL
PLAT MORPH BLD: NORMAL — SIGNIFICANT CHANGE UP
PLATELET # BLD AUTO: 122 K/UL — LOW (ref 150–400)
PLATELET # BLD AUTO: 124 K/UL — LOW (ref 150–400)
PLATELET # BLD AUTO: 141 K/UL — LOW (ref 150–400)
PLATELET # BLD AUTO: 144 K/UL — LOW (ref 150–400)
PLATELET # BLD AUTO: 162 K/UL — SIGNIFICANT CHANGE UP (ref 150–400)
PLATELET # BLD AUTO: 163 K/UL — SIGNIFICANT CHANGE UP (ref 150–400)
PLATELET # BLD AUTO: 165 K/UL — SIGNIFICANT CHANGE UP (ref 150–400)
PLATELET # BLD AUTO: 166 K/UL — SIGNIFICANT CHANGE UP (ref 150–400)
PLATELET # BLD AUTO: 172 K/UL — SIGNIFICANT CHANGE UP (ref 150–400)
PLATELET # BLD AUTO: 172 K/UL — SIGNIFICANT CHANGE UP (ref 150–400)
PLATELET # BLD AUTO: 173 K/UL — SIGNIFICANT CHANGE UP (ref 150–400)
PLATELET # BLD AUTO: 174 K/UL — SIGNIFICANT CHANGE UP (ref 150–400)
PLATELET # BLD AUTO: 178 K/UL — SIGNIFICANT CHANGE UP (ref 150–400)
PLATELET # BLD AUTO: 183 K/UL — SIGNIFICANT CHANGE UP (ref 150–400)
PLATELET # BLD AUTO: 195 K/UL — SIGNIFICANT CHANGE UP (ref 150–400)
PLATELET # BLD AUTO: 196 K/UL — SIGNIFICANT CHANGE UP (ref 150–400)
PLATELET # BLD AUTO: 216 K/UL — SIGNIFICANT CHANGE UP (ref 150–400)
PLATELET # BLD AUTO: 275 K/UL — SIGNIFICANT CHANGE UP (ref 150–400)
PLATELET # BLD AUTO: 86 K/UL — LOW (ref 150–400)
PLATELET COUNT - ESTIMATE: NORMAL — SIGNIFICANT CHANGE UP
PLATELET COUNT - ESTIMATE: NORMAL — SIGNIFICANT CHANGE UP
PMV BLD: 9 FL — SIGNIFICANT CHANGE UP (ref 7–13)
PMV BLD: 9.1 FL — SIGNIFICANT CHANGE UP (ref 7–13)
PMV BLD: 9.2 FL — SIGNIFICANT CHANGE UP (ref 7–13)
PMV BLD: 9.3 FL — SIGNIFICANT CHANGE UP (ref 7–13)
PMV BLD: 9.3 FL — SIGNIFICANT CHANGE UP (ref 7–13)
PMV BLD: 9.4 FL — SIGNIFICANT CHANGE UP (ref 7–13)
PMV BLD: 9.5 FL — SIGNIFICANT CHANGE UP (ref 7–13)
PMV BLD: 9.7 FL — SIGNIFICANT CHANGE UP (ref 7–13)
PMV BLD: 9.8 FL — SIGNIFICANT CHANGE UP (ref 7–13)
POLYCHROMASIA BLD QL SMEAR: SLIGHT — SIGNIFICANT CHANGE UP
POTASSIUM SERPL-MCNC: 2.6 MMOL/L — CRITICAL LOW (ref 3.5–5.3)
POTASSIUM SERPL-MCNC: 2.8 MMOL/L — CRITICAL LOW (ref 3.5–5.3)
POTASSIUM SERPL-MCNC: 3 MMOL/L — LOW (ref 3.5–5.3)
POTASSIUM SERPL-MCNC: 3.1 MMOL/L — LOW (ref 3.5–5.3)
POTASSIUM SERPL-MCNC: 3.1 MMOL/L — LOW (ref 3.5–5.3)
POTASSIUM SERPL-MCNC: 3.2 MMOL/L — LOW (ref 3.5–5.3)
POTASSIUM SERPL-MCNC: 3.3 MMOL/L — LOW (ref 3.5–5.3)
POTASSIUM SERPL-MCNC: 3.4 MMOL/L — LOW (ref 3.5–5.3)
POTASSIUM SERPL-MCNC: 3.7 MMOL/L — SIGNIFICANT CHANGE UP (ref 3.5–5.3)
POTASSIUM SERPL-MCNC: 3.8 MMOL/L — SIGNIFICANT CHANGE UP (ref 3.5–5.3)
POTASSIUM SERPL-MCNC: 3.8 MMOL/L — SIGNIFICANT CHANGE UP (ref 3.5–5.3)
POTASSIUM SERPL-MCNC: 4.2 MMOL/L — SIGNIFICANT CHANGE UP (ref 3.5–5.3)
POTASSIUM SERPL-MCNC: 4.3 MMOL/L — SIGNIFICANT CHANGE UP (ref 3.5–5.3)
POTASSIUM SERPL-MCNC: 4.5 MMOL/L — SIGNIFICANT CHANGE UP (ref 3.5–5.3)
POTASSIUM SERPL-MCNC: 4.7 MMOL/L — SIGNIFICANT CHANGE UP (ref 3.5–5.3)
POTASSIUM SERPL-MCNC: 4.9 MMOL/L — SIGNIFICANT CHANGE UP (ref 3.5–5.3)
POTASSIUM SERPL-MCNC: 5.2 MMOL/L — SIGNIFICANT CHANGE UP (ref 3.5–5.3)
POTASSIUM SERPL-MCNC: 5.3 MMOL/L — SIGNIFICANT CHANGE UP (ref 3.5–5.3)
POTASSIUM SERPL-SCNC: 2.6 MMOL/L — CRITICAL LOW (ref 3.5–5.3)
POTASSIUM SERPL-SCNC: 2.8 MMOL/L — CRITICAL LOW (ref 3.5–5.3)
POTASSIUM SERPL-SCNC: 3 MMOL/L — LOW (ref 3.5–5.3)
POTASSIUM SERPL-SCNC: 3.1 MMOL/L — LOW (ref 3.5–5.3)
POTASSIUM SERPL-SCNC: 3.1 MMOL/L — LOW (ref 3.5–5.3)
POTASSIUM SERPL-SCNC: 3.2 MMOL/L — LOW (ref 3.5–5.3)
POTASSIUM SERPL-SCNC: 3.3 MMOL/L — LOW (ref 3.5–5.3)
POTASSIUM SERPL-SCNC: 3.4 MMOL/L — LOW (ref 3.5–5.3)
POTASSIUM SERPL-SCNC: 3.7 MMOL/L — SIGNIFICANT CHANGE UP (ref 3.5–5.3)
POTASSIUM SERPL-SCNC: 3.8 MMOL/L — SIGNIFICANT CHANGE UP (ref 3.5–5.3)
POTASSIUM SERPL-SCNC: 3.8 MMOL/L — SIGNIFICANT CHANGE UP (ref 3.5–5.3)
POTASSIUM SERPL-SCNC: 4.2 MMOL/L — SIGNIFICANT CHANGE UP (ref 3.5–5.3)
POTASSIUM SERPL-SCNC: 4.3 MMOL/L — SIGNIFICANT CHANGE UP (ref 3.5–5.3)
POTASSIUM SERPL-SCNC: 4.5 MMOL/L — SIGNIFICANT CHANGE UP (ref 3.5–5.3)
POTASSIUM SERPL-SCNC: 4.7 MMOL/L — SIGNIFICANT CHANGE UP (ref 3.5–5.3)
POTASSIUM SERPL-SCNC: 4.9 MMOL/L — SIGNIFICANT CHANGE UP (ref 3.5–5.3)
POTASSIUM SERPL-SCNC: 5.2 MMOL/L — SIGNIFICANT CHANGE UP (ref 3.5–5.3)
POTASSIUM SERPL-SCNC: 5.3 MMOL/L — SIGNIFICANT CHANGE UP (ref 3.5–5.3)
PROT SERPL-MCNC: 5.5 G/DL — LOW (ref 6–8.3)
PROT SERPL-MCNC: 6 G/DL — SIGNIFICANT CHANGE UP (ref 6–8.3)
PROT SERPL-MCNC: 6.4 G/DL — SIGNIFICANT CHANGE UP (ref 6–8.3)
PROT SERPL-MCNC: 6.7 G/DL — SIGNIFICANT CHANGE UP (ref 6–8.3)
PROT SERPL-MCNC: 6.9 G/DL — SIGNIFICANT CHANGE UP (ref 6–8.3)
PROT UR-MCNC: 30 MG/DL
PROT UR-MCNC: 30 MG/DL
PROTHROM AB SERPL-ACNC: 11.6 SEC — SIGNIFICANT CHANGE UP (ref 9.9–13.4)
PROTHROM AB SERPL-ACNC: 11.6 SEC — SIGNIFICANT CHANGE UP (ref 9.9–13.4)
PROTHROM AB SERPL-ACNC: 12.8 SEC — SIGNIFICANT CHANGE UP (ref 9.9–13.4)
PROTHROM AB SERPL-ACNC: 13 SEC — SIGNIFICANT CHANGE UP (ref 9.9–13.4)
RBC # BLD: 2.53 M/UL — LOW (ref 4.2–5.8)
RBC # BLD: 2.53 M/UL — LOW (ref 4.2–5.8)
RBC # BLD: 2.72 M/UL — LOW (ref 4.2–5.8)
RBC # BLD: 2.73 M/UL — LOW (ref 4.2–5.8)
RBC # BLD: 2.76 M/UL — LOW (ref 4.2–5.8)
RBC # BLD: 2.76 M/UL — LOW (ref 4.2–5.8)
RBC # BLD: 2.77 M/UL — LOW (ref 4.2–5.8)
RBC # BLD: 2.92 M/UL — LOW (ref 4.2–5.8)
RBC # BLD: 2.94 M/UL — LOW (ref 4.2–5.8)
RBC # BLD: 2.97 M/UL — LOW (ref 4.2–5.8)
RBC # BLD: 2.99 M/UL — LOW (ref 4.2–5.8)
RBC # BLD: 3.03 M/UL — LOW (ref 4.2–5.8)
RBC # BLD: 3.05 M/UL — LOW (ref 4.2–5.8)
RBC # BLD: 3.08 M/UL — LOW (ref 4.2–5.8)
RBC # BLD: 3.09 M/UL — LOW (ref 4.2–5.8)
RBC # BLD: 3.21 M/UL — LOW (ref 4.2–5.8)
RBC # BLD: 3.33 M/UL — LOW (ref 4.2–5.8)
RBC # BLD: 3.4 M/UL — LOW (ref 4.2–5.8)
RBC # BLD: 3.46 M/UL — LOW (ref 4.2–5.8)
RBC # FLD: 13.2 % — SIGNIFICANT CHANGE UP (ref 10.3–14.5)
RBC # FLD: 13.3 % — SIGNIFICANT CHANGE UP (ref 10.3–14.5)
RBC # FLD: 13.4 % — SIGNIFICANT CHANGE UP (ref 10.3–14.5)
RBC # FLD: 13.6 % — SIGNIFICANT CHANGE UP (ref 10.3–14.5)
RBC # FLD: 13.7 % — SIGNIFICANT CHANGE UP (ref 10.3–14.5)
RBC # FLD: 13.7 % — SIGNIFICANT CHANGE UP (ref 10.3–14.5)
RBC # FLD: 13.8 % — SIGNIFICANT CHANGE UP (ref 10.3–14.5)
RBC # FLD: 13.9 % — SIGNIFICANT CHANGE UP (ref 10.3–14.5)
RBC # FLD: 14.4 % — SIGNIFICANT CHANGE UP (ref 10.3–14.5)
RBC # FLD: 14.8 % — HIGH (ref 10.3–14.5)
RBC # FLD: 14.8 % — HIGH (ref 10.3–14.5)
RBC # FLD: 15.2 % — HIGH (ref 10.3–14.5)
RBC # FLD: 16.8 % — HIGH (ref 10.3–14.5)
RBC BLD AUTO: ABNORMAL
RBC BLD AUTO: NORMAL — SIGNIFICANT CHANGE UP
RBC CASTS # UR COMP ASSIST: 16 /HPF — HIGH (ref 0–4)
RBC CASTS # UR COMP ASSIST: 7 /HPF — HIGH (ref 0–4)
RH IG SCN BLD-IMP: POSITIVE — SIGNIFICANT CHANGE UP
RH IG SCN BLD-IMP: POSITIVE — SIGNIFICANT CHANGE UP
RSV RNA NPH QL NAA+NON-PROBE: SIGNIFICANT CHANGE UP
S AUREUS DNA NOSE QL NAA+PROBE: DETECTED
SARS-COV-2 RNA SPEC QL NAA+PROBE: SIGNIFICANT CHANGE UP
SODIUM SERPL-SCNC: 125 MMOL/L — LOW (ref 135–145)
SODIUM SERPL-SCNC: 129 MMOL/L — LOW (ref 135–145)
SODIUM SERPL-SCNC: 131 MMOL/L — LOW (ref 135–145)
SODIUM SERPL-SCNC: 131 MMOL/L — LOW (ref 135–145)
SODIUM SERPL-SCNC: 132 MMOL/L — LOW (ref 135–145)
SODIUM SERPL-SCNC: 133 MMOL/L — LOW (ref 135–145)
SODIUM SERPL-SCNC: 133 MMOL/L — LOW (ref 135–145)
SODIUM SERPL-SCNC: 134 MMOL/L — LOW (ref 135–145)
SODIUM SERPL-SCNC: 135 MMOL/L — SIGNIFICANT CHANGE UP (ref 135–145)
SODIUM SERPL-SCNC: 136 MMOL/L — SIGNIFICANT CHANGE UP (ref 135–145)
SODIUM SERPL-SCNC: 137 MMOL/L — SIGNIFICANT CHANGE UP (ref 135–145)
SODIUM SERPL-SCNC: 137 MMOL/L — SIGNIFICANT CHANGE UP (ref 135–145)
SODIUM SERPL-SCNC: 138 MMOL/L — SIGNIFICANT CHANGE UP (ref 135–145)
SODIUM SERPL-SCNC: 139 MMOL/L — SIGNIFICANT CHANGE UP (ref 135–145)
SOURCE RESPIRATORY: SIGNIFICANT CHANGE UP
SP GR SPEC: 1.02 — SIGNIFICANT CHANGE UP (ref 1–1.03)
SP GR SPEC: 1.02 — SIGNIFICANT CHANGE UP (ref 1–1.03)
SPECIMEN SOURCE: SIGNIFICANT CHANGE UP
SQUAMOUS # UR AUTO: 0 /HPF — SIGNIFICANT CHANGE UP (ref 0–5)
SQUAMOUS # UR AUTO: 1 /HPF — SIGNIFICANT CHANGE UP (ref 0–5)
STFR SERPL-MCNC: 19.4 NMOL/L — SIGNIFICANT CHANGE UP (ref 12.2–27.3)
TIBC SERPL-MCNC: 248 UG/DL — SIGNIFICANT CHANGE UP (ref 220–430)
TROPONIN T, HIGH SENSITIVITY RESULT: 12 NG/L — SIGNIFICANT CHANGE UP
TROPONIN T, HIGH SENSITIVITY RESULT: 15 NG/L — SIGNIFICANT CHANGE UP
TROPONIN T, HIGH SENSITIVITY RESULT: 17 NG/L — SIGNIFICANT CHANGE UP
UIBC SERPL-MCNC: 187 UG/DL — SIGNIFICANT CHANGE UP (ref 110–370)
UROBILINOGEN FLD QL: 0.2 MG/DL — SIGNIFICANT CHANGE UP (ref 0.2–1)
UROBILINOGEN FLD QL: 0.2 MG/DL — SIGNIFICANT CHANGE UP (ref 0.2–1)
VIT B12 SERPL-MCNC: 1275 PG/ML — HIGH (ref 232–1245)
WBC # BLD: 10.24 K/UL — SIGNIFICANT CHANGE UP (ref 3.8–10.5)
WBC # BLD: 3.02 K/UL — LOW (ref 3.8–10.5)
WBC # BLD: 4.65 K/UL — SIGNIFICANT CHANGE UP (ref 3.8–10.5)
WBC # BLD: 5.17 K/UL — SIGNIFICANT CHANGE UP (ref 3.8–10.5)
WBC # BLD: 5.17 K/UL — SIGNIFICANT CHANGE UP (ref 3.8–10.5)
WBC # BLD: 5.47 K/UL — SIGNIFICANT CHANGE UP (ref 3.8–10.5)
WBC # BLD: 5.48 K/UL — SIGNIFICANT CHANGE UP (ref 3.8–10.5)
WBC # BLD: 5.56 K/UL — SIGNIFICANT CHANGE UP (ref 3.8–10.5)
WBC # BLD: 6.88 K/UL — SIGNIFICANT CHANGE UP (ref 3.8–10.5)
WBC # BLD: 6.95 K/UL — SIGNIFICANT CHANGE UP (ref 3.8–10.5)
WBC # BLD: 7.07 K/UL — SIGNIFICANT CHANGE UP (ref 3.8–10.5)
WBC # BLD: 7.11 K/UL — SIGNIFICANT CHANGE UP (ref 3.8–10.5)
WBC # BLD: 7.28 K/UL — SIGNIFICANT CHANGE UP (ref 3.8–10.5)
WBC # BLD: 7.75 K/UL — SIGNIFICANT CHANGE UP (ref 3.8–10.5)
WBC # BLD: 8.22 K/UL — SIGNIFICANT CHANGE UP (ref 3.8–10.5)
WBC # BLD: 8.72 K/UL — SIGNIFICANT CHANGE UP (ref 3.8–10.5)
WBC # BLD: 8.82 K/UL — SIGNIFICANT CHANGE UP (ref 3.8–10.5)
WBC # BLD: 9.02 K/UL — SIGNIFICANT CHANGE UP (ref 3.8–10.5)
WBC # BLD: 9.7 K/UL — SIGNIFICANT CHANGE UP (ref 3.8–10.5)
WBC # FLD AUTO: 10.24 K/UL — SIGNIFICANT CHANGE UP (ref 3.8–10.5)
WBC # FLD AUTO: 3.02 K/UL — LOW (ref 3.8–10.5)
WBC # FLD AUTO: 4.65 K/UL — SIGNIFICANT CHANGE UP (ref 3.8–10.5)
WBC # FLD AUTO: 5.17 K/UL — SIGNIFICANT CHANGE UP (ref 3.8–10.5)
WBC # FLD AUTO: 5.17 K/UL — SIGNIFICANT CHANGE UP (ref 3.8–10.5)
WBC # FLD AUTO: 5.47 K/UL — SIGNIFICANT CHANGE UP (ref 3.8–10.5)
WBC # FLD AUTO: 5.48 K/UL — SIGNIFICANT CHANGE UP (ref 3.8–10.5)
WBC # FLD AUTO: 5.56 K/UL — SIGNIFICANT CHANGE UP (ref 3.8–10.5)
WBC # FLD AUTO: 6.88 K/UL — SIGNIFICANT CHANGE UP (ref 3.8–10.5)
WBC # FLD AUTO: 6.95 K/UL — SIGNIFICANT CHANGE UP (ref 3.8–10.5)
WBC # FLD AUTO: 7.07 K/UL — SIGNIFICANT CHANGE UP (ref 3.8–10.5)
WBC # FLD AUTO: 7.11 K/UL — SIGNIFICANT CHANGE UP (ref 3.8–10.5)
WBC # FLD AUTO: 7.28 K/UL — SIGNIFICANT CHANGE UP (ref 3.8–10.5)
WBC # FLD AUTO: 7.75 K/UL — SIGNIFICANT CHANGE UP (ref 3.8–10.5)
WBC # FLD AUTO: 8.22 K/UL — SIGNIFICANT CHANGE UP (ref 3.8–10.5)
WBC # FLD AUTO: 8.72 K/UL — SIGNIFICANT CHANGE UP (ref 3.8–10.5)
WBC # FLD AUTO: 8.82 K/UL — SIGNIFICANT CHANGE UP (ref 3.8–10.5)
WBC # FLD AUTO: 9.02 K/UL — SIGNIFICANT CHANGE UP (ref 3.8–10.5)
WBC # FLD AUTO: 9.7 K/UL — SIGNIFICANT CHANGE UP (ref 3.8–10.5)
WBC UR QL: 0 /HPF — SIGNIFICANT CHANGE UP (ref 0–5)
WBC UR QL: 1 /HPF — SIGNIFICANT CHANGE UP (ref 0–5)

## 2025-01-01 PROCEDURE — 99222 1ST HOSP IP/OBS MODERATE 55: CPT

## 2025-01-01 PROCEDURE — 71275 CT ANGIOGRAPHY CHEST: CPT | Mod: 26

## 2025-01-01 PROCEDURE — 70491 CT SOFT TISSUE NECK W/DYE: CPT | Mod: 26

## 2025-01-01 PROCEDURE — 99222 1ST HOSP IP/OBS MODERATE 55: CPT | Mod: GC

## 2025-01-01 PROCEDURE — 99233 SBSQ HOSP IP/OBS HIGH 50: CPT

## 2025-01-01 PROCEDURE — 99232 SBSQ HOSP IP/OBS MODERATE 35: CPT

## 2025-01-01 PROCEDURE — 99232 SBSQ HOSP IP/OBS MODERATE 35: CPT | Mod: GC

## 2025-01-01 PROCEDURE — 49406 IMAGE CATH FLUID PERI/RETRO: CPT

## 2025-01-01 PROCEDURE — 93010 ELECTROCARDIOGRAM REPORT: CPT

## 2025-01-01 PROCEDURE — 71045 X-RAY EXAM CHEST 1 VIEW: CPT | Mod: 26

## 2025-01-01 PROCEDURE — G0545: CPT

## 2025-01-01 PROCEDURE — 99221 1ST HOSP IP/OBS SF/LOW 40: CPT | Mod: GC

## 2025-01-01 PROCEDURE — 99214 OFFICE O/P EST MOD 30 MIN: CPT

## 2025-01-01 PROCEDURE — 99239 HOSP IP/OBS DSCHRG MGMT >30: CPT

## 2025-01-01 PROCEDURE — 99291 CRITICAL CARE FIRST HOUR: CPT

## 2025-01-01 PROCEDURE — 76080 X-RAY EXAM OF FISTULA: CPT | Mod: 26

## 2025-01-01 PROCEDURE — 74177 CT ABD & PELVIS W/CONTRAST: CPT | Mod: 26

## 2025-01-01 PROCEDURE — 99223 1ST HOSP IP/OBS HIGH 75: CPT | Mod: 25

## 2025-01-01 PROCEDURE — 99497 ADVNCD CARE PLAN 30 MIN: CPT | Mod: 25

## 2025-01-01 PROCEDURE — 93306 TTE W/DOPPLER COMPLETE: CPT | Mod: 26

## 2025-01-01 PROCEDURE — 49424 ASSESS CYST CONTRAST INJECT: CPT

## 2025-01-01 PROCEDURE — 99232 SBSQ HOSP IP/OBS MODERATE 35: CPT | Mod: 25

## 2025-01-01 RX ORDER — CALCIUM CARB/VITAMIN D3/VIT K1 500-500-40
TABLET,CHEWABLE ORAL
Qty: 1 | Refills: 0 | Status: ACTIVE | COMMUNITY
Start: 2025-01-01 | End: 1900-01-01

## 2025-01-01 RX ORDER — METOPROLOL SUCCINATE 50 MG/1
1 TABLET, EXTENDED RELEASE ORAL
Qty: 60 | Refills: 0
Start: 2025-01-01 | End: 2025-01-01

## 2025-01-01 RX ORDER — METOCLOPRAMIDE HCL 10 MG
10 TABLET ORAL DAILY
Refills: 0 | Status: DISCONTINUED | OUTPATIENT
Start: 2025-01-01 | End: 2025-01-01

## 2025-01-01 RX ORDER — IBUPROFEN 200 MG
400 TABLET ORAL EVERY 6 HOURS
Refills: 0 | Status: DISCONTINUED | OUTPATIENT
Start: 2025-01-01 | End: 2025-01-01

## 2025-01-01 RX ORDER — VANCOMYCIN HCL IN 5 % DEXTROSE 1.5G/250ML
1000 PLASTIC BAG, INJECTION (ML) INTRAVENOUS ONCE
Refills: 0 | Status: COMPLETED | OUTPATIENT
Start: 2025-01-01 | End: 2025-01-01

## 2025-01-01 RX ORDER — ACETAMINOPHEN 500 MG/5ML
1000 LIQUID (ML) ORAL ONCE
Refills: 0 | Status: COMPLETED | OUTPATIENT
Start: 2025-01-01 | End: 2025-01-01

## 2025-01-01 RX ORDER — CIPROFLOXACIN HCL 250 MG
400 TABLET ORAL ONCE
Refills: 0 | Status: COMPLETED | OUTPATIENT
Start: 2025-01-01 | End: 2025-01-01

## 2025-01-01 RX ORDER — PIPERACILLIN-TAZO-DEXTROSE,ISO 3.375G/5
3.38 IV SOLUTION, PIGGYBACK PREMIX FROZEN(ML) INTRAVENOUS ONCE
Refills: 0 | Status: COMPLETED | OUTPATIENT
Start: 2025-01-01 | End: 2025-01-01

## 2025-01-01 RX ORDER — ACETAMINOPHEN 500 MG/5ML
1000 LIQUID (ML) ORAL EVERY 6 HOURS
Refills: 0 | Status: COMPLETED | OUTPATIENT
Start: 2025-01-01 | End: 2025-01-01

## 2025-01-01 RX ORDER — METHADONE HCL 10 MG
2 TABLET ORAL EVERY 12 HOURS
Refills: 0 | Status: DISCONTINUED | OUTPATIENT
Start: 2025-01-01 | End: 2025-01-01

## 2025-01-01 RX ORDER — METOPROLOL SUCCINATE 50 MG/1
5 TABLET, EXTENDED RELEASE ORAL EVERY 6 HOURS
Refills: 0 | Status: DISCONTINUED | OUTPATIENT
Start: 2025-01-01 | End: 2025-01-01

## 2025-01-01 RX ORDER — CIPROFLOXACIN HCL 250 MG
400 TABLET ORAL EVERY 12 HOURS
Refills: 0 | Status: DISCONTINUED | OUTPATIENT
Start: 2025-01-01 | End: 2025-01-01

## 2025-01-01 RX ORDER — CIPROFLOXACIN HCL 250 MG
5 TABLET ORAL
Qty: 1 | Refills: 0
Start: 2025-01-01 | End: 2025-01-01

## 2025-01-01 RX ORDER — VANCOMYCIN HCL IN 5 % DEXTROSE 1.5G/250ML
1000 PLASTIC BAG, INJECTION (ML) INTRAVENOUS EVERY 12 HOURS
Refills: 0 | Status: DISCONTINUED | OUTPATIENT
Start: 2025-01-01 | End: 2025-01-01

## 2025-01-01 RX ORDER — MAGNESIUM SULFATE 500 MG/ML
2 SYRINGE (ML) INJECTION ONCE
Refills: 0 | Status: COMPLETED | OUTPATIENT
Start: 2025-01-01 | End: 2025-01-01

## 2025-01-01 RX ORDER — PIPERACILLIN-TAZO-DEXTROSE,ISO 3.375G/5
3.38 IV SOLUTION, PIGGYBACK PREMIX FROZEN(ML) INTRAVENOUS ONCE
Refills: 0 | Status: DISCONTINUED | OUTPATIENT
Start: 2025-01-01 | End: 2025-01-01

## 2025-01-01 RX ORDER — MIDAZOLAM IN 0.9 % SOD.CHLORID 1 MG/ML
2 PLASTIC BAG, INJECTION (ML) INTRAVENOUS ONCE
Refills: 0 | Status: DISCONTINUED | OUTPATIENT
Start: 2025-01-01 | End: 2025-01-01

## 2025-01-01 RX ORDER — SODIUM CHLORIDE 0.65 %
1 AEROSOL, SPRAY (ML) NASAL ONCE
Refills: 0 | Status: DISCONTINUED | OUTPATIENT
Start: 2025-01-01 | End: 2025-01-01

## 2025-01-01 RX ORDER — SODIUM CHLORIDE 9 G/1000ML
1000 INJECTION, SOLUTION INTRAVENOUS ONCE
Refills: 0 | Status: COMPLETED | OUTPATIENT
Start: 2025-01-01 | End: 2025-01-01

## 2025-01-01 RX ORDER — TAMSULOSIN HYDROCHLORIDE 0.4 MG/1
0.4 CAPSULE ORAL AT BEDTIME
Refills: 0 | Status: DISCONTINUED | OUTPATIENT
Start: 2025-01-01 | End: 2025-01-01

## 2025-01-01 RX ORDER — METHADONE HCL 10 MG
2 TABLET ORAL ONCE
Refills: 0 | Status: DISCONTINUED | OUTPATIENT
Start: 2025-01-01 | End: 2025-01-01

## 2025-01-01 RX ORDER — POTASSIUM PHOSPHATE, MONOBASIC POTASSIUM PHOSPHATE, DIBASIC INJECTION, 236; 224 MG/ML; MG/ML
15 SOLUTION, CONCENTRATE INTRAVENOUS ONCE
Refills: 0 | Status: DISCONTINUED | OUTPATIENT
Start: 2025-01-01 | End: 2025-01-01

## 2025-01-01 RX ORDER — METOPROLOL SUCCINATE 50 MG/1
5 TABLET, EXTENDED RELEASE ORAL ONCE
Refills: 0 | Status: COMPLETED | OUTPATIENT
Start: 2025-01-01 | End: 2025-01-01

## 2025-01-01 RX ORDER — FINASTERIDE 1 MG/1
5 TABLET, FILM COATED ORAL DAILY
Refills: 0 | Status: DISCONTINUED | OUTPATIENT
Start: 2025-01-01 | End: 2025-01-01

## 2025-01-01 RX ORDER — SODIUM CHLORIDE 9 G/1000ML
1000 INJECTION, SOLUTION INTRAVENOUS
Refills: 0 | Status: DISCONTINUED | OUTPATIENT
Start: 2025-01-01 | End: 2025-01-01

## 2025-01-01 RX ORDER — DIAZEPAM 5 MG/1
5 TABLET ORAL EVERY 6 HOURS
Refills: 0 | Status: DISCONTINUED | OUTPATIENT
Start: 2025-01-01 | End: 2025-01-01

## 2025-01-01 RX ORDER — AMOXICILLIN AND CLAVULANATE POTASSIUM 500; 125 MG/1; MG/1
1 TABLET, FILM COATED ORAL
Refills: 0
Start: 2025-01-01

## 2025-01-01 RX ORDER — IPRATROPIUM BROMIDE AND ALBUTEROL SULFATE .5; 2.5 MG/3ML; MG/3ML
3 SOLUTION RESPIRATORY (INHALATION) EVERY 6 HOURS
Refills: 0 | Status: DISCONTINUED | OUTPATIENT
Start: 2025-01-01 | End: 2025-01-01

## 2025-01-01 RX ORDER — PIPERACILLIN-TAZO-DEXTROSE,ISO 3.375G/5
3.38 IV SOLUTION, PIGGYBACK PREMIX FROZEN(ML) INTRAVENOUS EVERY 8 HOURS
Refills: 0 | Status: DISCONTINUED | OUTPATIENT
Start: 2025-01-01 | End: 2025-01-01

## 2025-01-01 RX ORDER — HYDROMORPHONE/SOD CHLOR,ISO/PF 2 MG/10 ML
1 SYRINGE (ML) INJECTION
Refills: 0 | Status: DISCONTINUED | OUTPATIENT
Start: 2025-01-01 | End: 2025-01-01

## 2025-01-01 RX ORDER — AMOXICILLIN AND CLAVULANATE POTASSIUM 500; 125 MG/1; MG/1
1 TABLET, FILM COATED ORAL
Qty: 12 | Refills: 0
Start: 2025-01-01 | End: 2025-01-01

## 2025-01-01 RX ORDER — METHYLPREDNISOLONE ACETATE 80 MG/ML
1 INJECTION, SUSPENSION INTRA-ARTICULAR; INTRALESIONAL; INTRAMUSCULAR; SOFT TISSUE
Qty: 1 | Refills: 0
Start: 2025-01-01

## 2025-01-01 RX ORDER — MELATONIN 5 MG
3 TABLET ORAL ONCE
Refills: 0 | Status: COMPLETED | OUTPATIENT
Start: 2025-01-01 | End: 2025-01-01

## 2025-01-01 RX ORDER — ENOXAPARIN SODIUM 100 MG/ML
40 INJECTION SUBCUTANEOUS EVERY 24 HOURS
Refills: 0 | Status: DISCONTINUED | OUTPATIENT
Start: 2025-01-01 | End: 2025-01-01

## 2025-01-01 RX ORDER — METHADONE HCL 10 MG
1 TABLET ORAL EVERY 12 HOURS
Refills: 0 | Status: DISCONTINUED | OUTPATIENT
Start: 2025-01-01 | End: 2025-01-01

## 2025-01-01 RX ORDER — METRONIDAZOLE 250 MG
5 TABLET ORAL
Qty: 75 | Refills: 0
Start: 2025-01-01 | End: 2025-01-01

## 2025-01-01 RX ORDER — METHADONE HCL 10 MG
4 TABLET ORAL EVERY 12 HOURS
Refills: 0 | Status: DISCONTINUED | OUTPATIENT
Start: 2025-01-01 | End: 2025-01-01

## 2025-01-01 RX ORDER — DEXAMETHASONE 0.5 MG/1
10 TABLET ORAL ONCE
Refills: 0 | Status: COMPLETED | OUTPATIENT
Start: 2025-01-01 | End: 2025-01-01

## 2025-01-01 RX ORDER — METOPROLOL SUCCINATE 50 MG/1
2.5 TABLET, EXTENDED RELEASE ORAL ONCE
Refills: 0 | Status: COMPLETED | OUTPATIENT
Start: 2025-01-01 | End: 2025-01-01

## 2025-01-01 RX ORDER — DEXAMETHASONE 0.5 MG/1
10 TABLET ORAL EVERY 6 HOURS
Refills: 0 | Status: DISCONTINUED | OUTPATIENT
Start: 2025-01-01 | End: 2025-01-01

## 2025-01-01 RX ORDER — CALCIUM GLUCONATE 20 MG/ML
2 INJECTION, SOLUTION INTRAVENOUS ONCE
Refills: 0 | Status: COMPLETED | OUTPATIENT
Start: 2025-01-01 | End: 2025-01-01

## 2025-01-01 RX ORDER — POTASSIUM CHLORIDE, DEXTROSE MONOHYDRATE AND SODIUM CHLORIDE 150; 5; 900 MG/100ML; G/100ML; MG/100ML
1000 INJECTION, SOLUTION INTRAVENOUS
Refills: 0 | Status: DISCONTINUED | OUTPATIENT
Start: 2025-01-01 | End: 2025-01-01

## 2025-01-01 RX ORDER — SOD PHOS DI, MONO/K PHOS MONO 250 MG
1 TABLET ORAL ONCE
Refills: 0 | Status: DISCONTINUED | OUTPATIENT
Start: 2025-01-01 | End: 2025-01-01

## 2025-01-01 RX ORDER — MAGNESIUM SULFATE 500 MG/ML
1 SYRINGE (ML) INJECTION ONCE
Refills: 0 | Status: COMPLETED | OUTPATIENT
Start: 2025-01-01 | End: 2025-01-01

## 2025-01-01 RX ORDER — METRONIDAZOLE 250 MG
500 TABLET ORAL ONCE
Refills: 0 | Status: COMPLETED | OUTPATIENT
Start: 2025-01-01 | End: 2025-01-01

## 2025-01-01 RX ORDER — MAGNESIUM, ALUMINUM HYDROXIDE 200-200 MG
30 TABLET,CHEWABLE ORAL EVERY 4 HOURS
Refills: 0 | Status: DISCONTINUED | OUTPATIENT
Start: 2025-01-01 | End: 2025-01-01

## 2025-01-01 RX ORDER — POTASSIUM PHOSPHATE, MONOBASIC POTASSIUM PHOSPHATE, DIBASIC INJECTION, 236; 224 MG/ML; MG/ML
15 SOLUTION, CONCENTRATE INTRAVENOUS ONCE
Refills: 0 | Status: COMPLETED | OUTPATIENT
Start: 2025-01-01 | End: 2025-01-01

## 2025-01-01 RX ORDER — SOD PHOS DI, MONO/K PHOS MONO 250 MG
1 TABLET ORAL EVERY 4 HOURS
Refills: 0 | Status: COMPLETED | OUTPATIENT
Start: 2025-01-01 | End: 2025-01-01

## 2025-01-01 RX ORDER — LORAZEPAM 4 MG/ML
0.5 VIAL (ML) INJECTION EVERY 6 HOURS
Refills: 0 | Status: DISCONTINUED | OUTPATIENT
Start: 2025-01-01 | End: 2025-01-01

## 2025-01-01 RX ORDER — METRONIDAZOLE 250 MG
500 TABLET ORAL
Qty: 150 | Refills: 0
Start: 2025-01-01 | End: 2025-01-01

## 2025-01-01 RX ORDER — METOPROLOL SUCCINATE 50 MG/1
25 TABLET, EXTENDED RELEASE ORAL
Refills: 0 | Status: DISCONTINUED | OUTPATIENT
Start: 2025-01-01 | End: 2025-01-01

## 2025-01-01 RX ORDER — CEFTRIAXONE 500 MG/1
2 INJECTION, POWDER, FOR SOLUTION INTRAMUSCULAR; INTRAVENOUS ONCE
Refills: 0 | Status: COMPLETED | OUTPATIENT
Start: 2025-01-01 | End: 2025-01-01

## 2025-01-01 RX ORDER — GLYCOPYRROLATE 0.2 MG/ML
0.4 INJECTION INTRAMUSCULAR; INTRAVENOUS EVERY 6 HOURS
Refills: 0 | Status: DISCONTINUED | OUTPATIENT
Start: 2025-01-01 | End: 2025-01-01

## 2025-01-01 RX ORDER — HYDROMORPHONE/SOD CHLOR,ISO/PF 2 MG/10 ML
0.2 SYRINGE (ML) INJECTION EVERY 6 HOURS
Refills: 0 | Status: DISCONTINUED | OUTPATIENT
Start: 2025-01-01 | End: 2025-01-01

## 2025-01-01 RX ORDER — CIPROFLOXACIN HCL 250 MG
TABLET ORAL
Refills: 0 | Status: DISCONTINUED | OUTPATIENT
Start: 2025-01-01 | End: 2025-01-01

## 2025-01-01 RX ORDER — METRONIDAZOLE 250 MG
500 TABLET ORAL EVERY 12 HOURS
Refills: 0 | Status: DISCONTINUED | OUTPATIENT
Start: 2025-01-01 | End: 2025-01-01

## 2025-01-01 RX ORDER — METOCLOPRAMIDE HCL 10 MG
10 TABLET ORAL
Refills: 0 | DISCHARGE

## 2025-01-01 RX ORDER — MELATONIN 5 MG
3 TABLET ORAL AT BEDTIME
Refills: 0 | Status: DISCONTINUED | OUTPATIENT
Start: 2025-01-01 | End: 2025-01-01

## 2025-01-01 RX ORDER — ATORVASTATIN CALCIUM 80 MG/1
40 TABLET, FILM COATED ORAL AT BEDTIME
Refills: 0 | Status: DISCONTINUED | OUTPATIENT
Start: 2025-01-01 | End: 2025-01-01

## 2025-01-01 RX ORDER — OXYCODONE HYDROCHLORIDE 30 MG/1
5 TABLET ORAL
Refills: 0 | DISCHARGE

## 2025-01-01 RX ORDER — HYDROMORPHONE/SOD CHLOR,ISO/PF 2 MG/10 ML
0.2 SYRINGE (ML) INJECTION
Refills: 0 | Status: DISCONTINUED | OUTPATIENT
Start: 2025-01-01 | End: 2025-01-01

## 2025-01-01 RX ORDER — DEXTROSE 50 % IN WATER 50 %
50 SYRINGE (ML) INTRAVENOUS ONCE
Refills: 0 | Status: COMPLETED | OUTPATIENT
Start: 2025-01-01 | End: 2025-01-01

## 2025-01-01 RX ORDER — SODIUM PHOSPHATE,DIBASIC DIHYD
30 POWDER (GRAM) MISCELLANEOUS ONCE
Refills: 0 | Status: COMPLETED | OUTPATIENT
Start: 2025-01-01 | End: 2025-01-01

## 2025-01-01 RX ORDER — METHADONE HCL 10 MG
2 TABLET ORAL
Refills: 0 | DISCHARGE

## 2025-01-01 RX ORDER — SILVER SULFADIAZINE 10 MG/G
1 CREAM TOPICAL TWICE DAILY
Qty: 1 | Refills: 2 | Status: ACTIVE | COMMUNITY
Start: 2025-01-01 | End: 1900-01-01

## 2025-01-01 RX ORDER — CALCIUM CARBONATE 750 MG/1
5 TABLET ORAL
Qty: 35 | Refills: 0
Start: 2025-01-01 | End: 2025-01-01

## 2025-01-01 RX ORDER — CEFPODOXIME PROXETIL 200 MG/1
5 TABLET, FILM COATED ORAL
Qty: 1 | Refills: 0
Start: 2025-01-01 | End: 2025-01-01

## 2025-01-01 RX ORDER — ALBUTEROL SULFATE 2.5 MG/3ML
2 VIAL, NEBULIZER (ML) INHALATION EVERY 6 HOURS
Refills: 0 | Status: DISCONTINUED | OUTPATIENT
Start: 2025-01-01 | End: 2025-01-01

## 2025-01-01 RX ORDER — ONDANSETRON HCL/PF 4 MG/2 ML
4 VIAL (ML) INJECTION EVERY 8 HOURS
Refills: 0 | Status: DISCONTINUED | OUTPATIENT
Start: 2025-01-01 | End: 2025-01-01

## 2025-01-01 RX ORDER — DEXAMETHASONE 0.5 MG/1
6 TABLET ORAL ONCE
Refills: 0 | Status: COMPLETED | OUTPATIENT
Start: 2025-01-01 | End: 2025-01-01

## 2025-01-01 RX ORDER — ATORVASTATIN CALCIUM 80 MG/1
0 TABLET, FILM COATED ORAL
Qty: 0 | Refills: 1 | DISCHARGE

## 2025-01-01 RX ORDER — CEFTRIAXONE 500 MG/1
INJECTION, POWDER, FOR SOLUTION INTRAMUSCULAR; INTRAVENOUS
Refills: 0 | Status: DISCONTINUED | OUTPATIENT
Start: 2025-01-01 | End: 2025-01-01

## 2025-01-01 RX ORDER — DEXAMETHASONE 0.5 MG/1
10 TABLET ORAL EVERY 8 HOURS
Refills: 0 | Status: DISCONTINUED | OUTPATIENT
Start: 2025-01-01 | End: 2025-01-01

## 2025-01-01 RX ORDER — METRONIDAZOLE 250 MG
TABLET ORAL
Refills: 0 | Status: DISCONTINUED | OUTPATIENT
Start: 2025-01-01 | End: 2025-01-01

## 2025-01-01 RX ORDER — OXYCODONE HYDROCHLORIDE 30 MG/1
10 TABLET ORAL EVERY 4 HOURS
Refills: 0 | Status: DISCONTINUED | OUTPATIENT
Start: 2025-01-01 | End: 2025-01-01

## 2025-01-01 RX ORDER — OMEPRAZOLE 20 MG/1
0 CAPSULE, DELAYED RELEASE ORAL
Qty: 0 | Refills: 0 | DISCHARGE

## 2025-01-01 RX ORDER — ACETAMINOPHEN 500 MG/5ML
650 LIQUID (ML) ORAL EVERY 6 HOURS
Refills: 0 | Status: DISCONTINUED | OUTPATIENT
Start: 2025-01-01 | End: 2025-01-01

## 2025-01-01 RX ORDER — SODIUM CHLORIDE 0.65 %
1 AEROSOL, SPRAY (ML) NASAL
Refills: 0 | Status: DISCONTINUED | OUTPATIENT
Start: 2025-01-01 | End: 2025-01-01

## 2025-01-01 RX ORDER — SILVER SULFADIAZINE 1 %
1 CREAM (GRAM) TOPICAL DAILY
Refills: 0 | Status: DISCONTINUED | OUTPATIENT
Start: 2025-01-01 | End: 2025-01-01

## 2025-01-01 RX ORDER — MIDAZOLAM IN 0.9 % SOD.CHLORID 1 MG/ML
2 PLASTIC BAG, INJECTION (ML) INTRAVENOUS EVERY 4 HOURS
Refills: 0 | Status: DISCONTINUED | OUTPATIENT
Start: 2025-01-01 | End: 2025-01-01

## 2025-01-01 RX ORDER — ONDANSETRON HCL/PF 4 MG/2 ML
4 VIAL (ML) INJECTION ONCE
Refills: 0 | Status: COMPLETED | OUTPATIENT
Start: 2025-01-01 | End: 2025-01-01

## 2025-01-01 RX ORDER — SOD PHOS DI, MONO/K PHOS MONO 250 MG
1 TABLET ORAL ONCE
Refills: 0 | Status: COMPLETED | OUTPATIENT
Start: 2025-01-01 | End: 2025-01-01

## 2025-01-01 RX ORDER — DEXAMETHASONE 0.5 MG/1
4 TABLET ORAL ONCE
Refills: 0 | Status: DISCONTINUED | OUTPATIENT
Start: 2025-01-01 | End: 2025-01-01

## 2025-01-01 RX ORDER — DEXAMETHASONE 0.5 MG/1
4 TABLET ORAL ONCE
Refills: 0 | Status: COMPLETED | OUTPATIENT
Start: 2025-01-01 | End: 2025-01-01

## 2025-01-01 RX ORDER — SODIUM CHLORIDE 9 G/1000ML
2100 INJECTION, SOLUTION INTRAVENOUS ONCE
Refills: 0 | Status: COMPLETED | OUTPATIENT
Start: 2025-01-01 | End: 2025-01-01

## 2025-01-01 RX ORDER — SODIUM CHLORIDE 9 G/1000ML
500 INJECTION, SOLUTION INTRAVENOUS ONCE
Refills: 0 | Status: COMPLETED | OUTPATIENT
Start: 2025-01-01 | End: 2025-01-01

## 2025-01-01 RX ORDER — METOPROLOL SUCCINATE 50 MG/1
1 TABLET, EXTENDED RELEASE ORAL
Qty: 0 | Refills: 0 | DISCHARGE
Start: 2025-01-01

## 2025-01-01 RX ADMIN — Medication 3 MILLIGRAM(S): at 00:01

## 2025-01-01 RX ADMIN — Medication 200 MILLIGRAM(S): at 07:10

## 2025-01-01 RX ADMIN — DEXAMETHASONE 102 MILLIGRAM(S): 0.5 TABLET ORAL at 00:44

## 2025-01-01 RX ADMIN — Medication 250 MILLIGRAM(S): at 05:25

## 2025-01-01 RX ADMIN — FINASTERIDE 5 MILLIGRAM(S): 1 TABLET, FILM COATED ORAL at 13:07

## 2025-01-01 RX ADMIN — FINASTERIDE 5 MILLIGRAM(S): 1 TABLET, FILM COATED ORAL at 11:09

## 2025-01-01 RX ADMIN — SODIUM CHLORIDE 100 MILLILITER(S): 9 INJECTION, SOLUTION INTRAVENOUS at 05:12

## 2025-01-01 RX ADMIN — Medication 1 PACKET(S): at 10:55

## 2025-01-01 RX ADMIN — Medication 100 MILLIGRAM(S): at 06:14

## 2025-01-01 RX ADMIN — Medication 1 MILLIGRAM(S): at 22:01

## 2025-01-01 RX ADMIN — DIAZEPAM 5 MILLIGRAM(S): 5 TABLET ORAL at 10:55

## 2025-01-01 RX ADMIN — FINASTERIDE 5 MILLIGRAM(S): 1 TABLET, FILM COATED ORAL at 12:09

## 2025-01-01 RX ADMIN — Medication 400 MILLIGRAM(S): at 16:48

## 2025-01-01 RX ADMIN — Medication 1 DROP(S): at 08:10

## 2025-01-01 RX ADMIN — CALCIUM GLUCONATE 200 GRAM(S): 20 INJECTION, SOLUTION INTRAVENOUS at 04:04

## 2025-01-01 RX ADMIN — DEXAMETHASONE 6 MILLIGRAM(S): 0.5 TABLET ORAL at 17:10

## 2025-01-01 RX ADMIN — Medication 25 GRAM(S): at 21:50

## 2025-01-01 RX ADMIN — Medication 1000 MILLIGRAM(S): at 06:25

## 2025-01-01 RX ADMIN — Medication 100 MILLIEQUIVALENT(S): at 04:10

## 2025-01-01 RX ADMIN — Medication 25 GRAM(S): at 16:03

## 2025-01-01 RX ADMIN — Medication 2 MILLIGRAM(S): at 23:18

## 2025-01-01 RX ADMIN — DEXAMETHASONE 102 MILLIGRAM(S): 0.5 TABLET ORAL at 11:02

## 2025-01-01 RX ADMIN — Medication 100 MILLILITER(S): at 22:01

## 2025-01-01 RX ADMIN — Medication 100 MILLIEQUIVALENT(S): at 19:00

## 2025-01-01 RX ADMIN — ENOXAPARIN SODIUM 40 MILLIGRAM(S): 100 INJECTION SUBCUTANEOUS at 11:32

## 2025-01-01 RX ADMIN — Medication 1 MILLIGRAM(S): at 22:23

## 2025-01-01 RX ADMIN — Medication 3 MILLIGRAM(S): at 01:27

## 2025-01-01 RX ADMIN — FINASTERIDE 5 MILLIGRAM(S): 1 TABLET, FILM COATED ORAL at 13:41

## 2025-01-01 RX ADMIN — Medication 400 MILLIGRAM(S): at 11:26

## 2025-01-01 RX ADMIN — Medication 100 MILLIEQUIVALENT(S): at 09:19

## 2025-01-01 RX ADMIN — DEXAMETHASONE 4 MILLIGRAM(S): 0.5 TABLET ORAL at 19:40

## 2025-01-01 RX ADMIN — Medication 1 PACKET(S): at 18:08

## 2025-01-01 RX ADMIN — Medication 1000 MILLIGRAM(S): at 11:56

## 2025-01-01 RX ADMIN — Medication 1 MILLIGRAM(S): at 12:15

## 2025-01-01 RX ADMIN — ENOXAPARIN SODIUM 40 MILLIGRAM(S): 100 INJECTION SUBCUTANEOUS at 11:36

## 2025-01-01 RX ADMIN — Medication 25 GRAM(S): at 13:54

## 2025-01-01 RX ADMIN — Medication 85 MILLIMOLE(S): at 12:42

## 2025-01-01 RX ADMIN — Medication 1 MILLIGRAM(S): at 10:24

## 2025-01-01 RX ADMIN — Medication 40 MILLIEQUIVALENT(S): at 14:20

## 2025-01-01 RX ADMIN — Medication 2 MILLIGRAM(S): at 23:40

## 2025-01-01 RX ADMIN — Medication 2000 MILLILITER(S): at 23:29

## 2025-01-01 RX ADMIN — Medication 1000 MILLIGRAM(S): at 00:38

## 2025-01-01 RX ADMIN — Medication 100 MILLIEQUIVALENT(S): at 07:00

## 2025-01-01 RX ADMIN — SODIUM CHLORIDE 1000 MILLILITER(S): 9 INJECTION, SOLUTION INTRAVENOUS at 16:47

## 2025-01-01 RX ADMIN — Medication 100 MILLIGRAM(S): at 19:03

## 2025-01-01 RX ADMIN — Medication 20 MILLIEQUIVALENT(S): at 18:50

## 2025-01-01 RX ADMIN — Medication 1 APPLICATION(S): at 10:11

## 2025-01-01 RX ADMIN — Medication 100 GRAM(S): at 12:10

## 2025-01-01 RX ADMIN — Medication 4 MILLIGRAM(S): at 20:10

## 2025-01-01 RX ADMIN — Medication 100 MILLIEQUIVALENT(S): at 12:10

## 2025-01-01 RX ADMIN — Medication 200 MILLIGRAM(S): at 05:23

## 2025-01-01 RX ADMIN — Medication 4 MILLIGRAM(S): at 03:58

## 2025-01-01 RX ADMIN — Medication 2 MILLIGRAM(S): at 23:23

## 2025-01-01 RX ADMIN — Medication 1 MILLIGRAM(S): at 12:09

## 2025-01-01 RX ADMIN — Medication 100 MILLIEQUIVALENT(S): at 06:32

## 2025-01-01 RX ADMIN — SODIUM CHLORIDE 1000 MILLILITER(S): 9 INJECTION, SOLUTION INTRAVENOUS at 13:39

## 2025-01-01 RX ADMIN — Medication 40 MILLIEQUIVALENT(S): at 12:26

## 2025-01-01 RX ADMIN — Medication 25 GRAM(S): at 05:49

## 2025-01-01 RX ADMIN — Medication 100 MILLIGRAM(S): at 05:24

## 2025-01-01 RX ADMIN — Medication 100 MILLIEQUIVALENT(S): at 15:20

## 2025-01-01 RX ADMIN — Medication 1 MILLIGRAM(S): at 13:06

## 2025-01-01 RX ADMIN — Medication 100 MILLIGRAM(S): at 08:47

## 2025-01-01 RX ADMIN — Medication 1 MILLIGRAM(S): at 11:10

## 2025-01-01 RX ADMIN — Medication 100 MILLIGRAM(S): at 05:12

## 2025-01-01 RX ADMIN — Medication 1000 MILLIGRAM(S): at 18:46

## 2025-01-01 RX ADMIN — METOPROLOL SUCCINATE 2.5 MILLIGRAM(S): 50 TABLET, EXTENDED RELEASE ORAL at 04:40

## 2025-01-01 RX ADMIN — TAMSULOSIN HYDROCHLORIDE 0.4 MILLIGRAM(S): 0.4 CAPSULE ORAL at 00:03

## 2025-01-01 RX ADMIN — Medication 200 MILLIGRAM(S): at 17:57

## 2025-01-01 RX ADMIN — Medication 100 MILLILITER(S): at 05:49

## 2025-01-01 RX ADMIN — Medication 100 GRAM(S): at 09:20

## 2025-01-01 RX ADMIN — Medication 100 MILLIEQUIVALENT(S): at 09:28

## 2025-01-01 RX ADMIN — Medication 1 MILLIGRAM(S): at 21:34

## 2025-01-01 RX ADMIN — Medication 30 MILLIEQUIVALENT(S): at 10:06

## 2025-01-01 RX ADMIN — Medication 100 MILLIGRAM(S): at 17:03

## 2025-01-01 RX ADMIN — ATORVASTATIN CALCIUM 40 MILLIGRAM(S): 80 TABLET, FILM COATED ORAL at 00:03

## 2025-01-01 RX ADMIN — Medication 1 APPLICATION(S): at 11:34

## 2025-01-01 RX ADMIN — Medication 250 MILLIGRAM(S): at 05:42

## 2025-01-01 RX ADMIN — Medication 25 GRAM(S): at 10:33

## 2025-01-01 RX ADMIN — Medication 25 GRAM(S): at 21:12

## 2025-01-01 RX ADMIN — Medication 25 GRAM(S): at 06:18

## 2025-01-01 RX ADMIN — Medication 1 APPLICATION(S): at 11:11

## 2025-01-01 RX ADMIN — Medication 100 MILLIEQUIVALENT(S): at 05:12

## 2025-01-01 RX ADMIN — Medication 25 GRAM(S): at 09:34

## 2025-01-01 RX ADMIN — Medication 25 GRAM(S): at 05:42

## 2025-01-01 RX ADMIN — Medication 250 MILLIGRAM(S): at 13:06

## 2025-01-01 RX ADMIN — IPRATROPIUM BROMIDE AND ALBUTEROL SULFATE 3 MILLILITER(S): .5; 2.5 SOLUTION RESPIRATORY (INHALATION) at 03:10

## 2025-01-01 RX ADMIN — DEXAMETHASONE 102 MILLIGRAM(S): 0.5 TABLET ORAL at 07:54

## 2025-01-01 RX ADMIN — Medication 1 MILLIGRAM(S): at 21:12

## 2025-01-01 RX ADMIN — SODIUM CHLORIDE 2100 MILLILITER(S): 9 INJECTION, SOLUTION INTRAVENOUS at 11:50

## 2025-01-01 RX ADMIN — Medication 25 GRAM(S): at 22:23

## 2025-01-01 RX ADMIN — ENOXAPARIN SODIUM 40 MILLIGRAM(S): 100 INJECTION SUBCUTANEOUS at 15:17

## 2025-01-01 RX ADMIN — Medication 25 GRAM(S): at 21:33

## 2025-01-01 RX ADMIN — METOPROLOL SUCCINATE 25 MILLIGRAM(S): 50 TABLET, EXTENDED RELEASE ORAL at 06:28

## 2025-01-01 RX ADMIN — Medication 1 MILLIGRAM(S): at 10:42

## 2025-01-01 RX ADMIN — Medication 100 MILLIGRAM(S): at 17:56

## 2025-01-01 RX ADMIN — Medication 1000 MILLIGRAM(S): at 15:35

## 2025-01-01 RX ADMIN — Medication 1 APPLICATION(S): at 13:08

## 2025-01-01 RX ADMIN — Medication 20 MILLIEQUIVALENT(S): at 12:13

## 2025-01-01 RX ADMIN — METOPROLOL SUCCINATE 25 MILLIGRAM(S): 50 TABLET, EXTENDED RELEASE ORAL at 18:50

## 2025-01-01 RX ADMIN — Medication 200 GRAM(S): at 04:32

## 2025-01-01 RX ADMIN — Medication 1 APPLICATION(S): at 12:45

## 2025-01-01 RX ADMIN — Medication 25 GRAM(S): at 06:31

## 2025-01-01 RX ADMIN — Medication 25 GRAM(S): at 13:47

## 2025-01-01 RX ADMIN — Medication 1 APPLICATION(S): at 12:18

## 2025-01-01 RX ADMIN — Medication 2 MILLIGRAM(S): at 11:08

## 2025-01-01 RX ADMIN — METOPROLOL SUCCINATE 25 MILLIGRAM(S): 50 TABLET, EXTENDED RELEASE ORAL at 05:48

## 2025-01-01 RX ADMIN — Medication 200 MILLIGRAM(S): at 17:04

## 2025-01-01 RX ADMIN — Medication 1 SPRAY(S): at 20:59

## 2025-01-01 RX ADMIN — OXYCODONE HYDROCHLORIDE 10 MILLIGRAM(S): 30 TABLET ORAL at 23:58

## 2025-01-01 RX ADMIN — CEFTRIAXONE 100 MILLIGRAM(S): 500 INJECTION, POWDER, FOR SOLUTION INTRAMUSCULAR; INTRAVENOUS at 16:47

## 2025-01-01 RX ADMIN — Medication 1 APPLICATION(S): at 11:22

## 2025-01-01 RX ADMIN — Medication 100 MILLIEQUIVALENT(S): at 13:41

## 2025-01-01 RX ADMIN — Medication 1 APPLICATION(S): at 15:18

## 2025-01-01 RX ADMIN — SODIUM CHLORIDE 100 MILLILITER(S): 9 INJECTION, SOLUTION INTRAVENOUS at 19:16

## 2025-01-01 RX ADMIN — Medication 1000 MILLIGRAM(S): at 16:33

## 2025-01-01 RX ADMIN — Medication 100 MILLIEQUIVALENT(S): at 11:34

## 2025-01-01 RX ADMIN — Medication 25 GRAM(S): at 04:04

## 2025-01-01 RX ADMIN — Medication 40 MILLIEQUIVALENT(S): at 18:01

## 2025-01-01 RX ADMIN — DEXAMETHASONE 102 MILLIGRAM(S): 0.5 TABLET ORAL at 05:18

## 2025-01-01 RX ADMIN — Medication 25 GRAM(S): at 05:52

## 2025-01-01 RX ADMIN — Medication 25 GRAM(S): at 13:16

## 2025-01-01 RX ADMIN — Medication 25 GRAM(S): at 07:59

## 2025-01-01 RX ADMIN — ENOXAPARIN SODIUM 40 MILLIGRAM(S): 100 INJECTION SUBCUTANEOUS at 11:10

## 2025-01-01 RX ADMIN — METOPROLOL SUCCINATE 5 MILLIGRAM(S): 50 TABLET, EXTENDED RELEASE ORAL at 03:20

## 2025-01-01 RX ADMIN — Medication 100 MILLIGRAM(S): at 17:31

## 2025-01-01 RX ADMIN — METOPROLOL SUCCINATE 25 MILLIGRAM(S): 50 TABLET, EXTENDED RELEASE ORAL at 18:10

## 2025-01-01 RX ADMIN — POTASSIUM PHOSPHATE, MONOBASIC POTASSIUM PHOSPHATE, DIBASIC INJECTION, 62.5 MILLIMOLE(S): 236; 224 SOLUTION, CONCENTRATE INTRAVENOUS at 17:15

## 2025-01-01 RX ADMIN — Medication 100 MILLILITER(S): at 10:12

## 2025-01-01 RX ADMIN — Medication 50 MILLILITER(S): at 04:04

## 2025-01-01 RX ADMIN — Medication 400 MILLIGRAM(S): at 23:27

## 2025-01-01 RX ADMIN — SODIUM CHLORIDE 100 MILLILITER(S): 9 INJECTION, SOLUTION INTRAVENOUS at 03:10

## 2025-01-01 RX ADMIN — Medication 100 MILLILITER(S): at 19:57

## 2025-01-01 RX ADMIN — Medication 200 MILLIGRAM(S): at 17:31

## 2025-01-01 RX ADMIN — Medication 200 MILLIGRAM(S): at 19:04

## 2025-01-01 RX ADMIN — Medication 100 MILLIEQUIVALENT(S): at 08:00

## 2025-01-01 RX ADMIN — Medication 1 APPLICATION(S): at 11:32

## 2025-01-01 RX ADMIN — Medication 100 MILLIEQUIVALENT(S): at 06:14

## 2025-01-01 RX ADMIN — ENOXAPARIN SODIUM 40 MILLIGRAM(S): 100 INJECTION SUBCUTANEOUS at 10:43

## 2025-01-01 RX ADMIN — DEXAMETHASONE 102 MILLIGRAM(S): 0.5 TABLET ORAL at 23:01

## 2025-01-01 RX ADMIN — Medication 1 APPLICATION(S): at 13:39

## 2025-01-01 RX ADMIN — Medication 400 MILLIGRAM(S): at 15:53

## 2025-01-01 RX ADMIN — Medication 400 MILLIGRAM(S): at 23:03

## 2025-01-01 RX ADMIN — Medication 200 GRAM(S): at 20:47

## 2025-01-01 RX ADMIN — Medication 100 MILLIEQUIVALENT(S): at 11:05

## 2025-01-01 RX ADMIN — ENOXAPARIN SODIUM 40 MILLIGRAM(S): 100 INJECTION SUBCUTANEOUS at 12:17

## 2025-01-01 RX ADMIN — SODIUM CHLORIDE 100 MILLILITER(S): 9 INJECTION, SOLUTION INTRAVENOUS at 08:48

## 2025-01-01 RX ADMIN — Medication 200 MILLIGRAM(S): at 05:45

## 2025-01-01 RX ADMIN — Medication 25 GRAM(S): at 06:24

## 2025-01-01 RX ADMIN — Medication 25 GRAM(S): at 16:11

## 2025-01-01 RX ADMIN — SODIUM CHLORIDE 100 MILLILITER(S): 9 INJECTION, SOLUTION INTRAVENOUS at 05:35

## 2025-01-01 RX ADMIN — Medication 400 MILLIGRAM(S): at 05:52

## 2025-01-01 RX ADMIN — METOPROLOL SUCCINATE 25 MILLIGRAM(S): 50 TABLET, EXTENDED RELEASE ORAL at 18:12

## 2025-01-01 RX ADMIN — Medication 1 MILLIGRAM(S): at 10:33

## 2025-01-01 RX ADMIN — ENOXAPARIN SODIUM 40 MILLIGRAM(S): 100 INJECTION SUBCUTANEOUS at 13:07

## 2025-01-01 RX ADMIN — Medication 200 MILLIGRAM(S): at 07:21

## 2025-01-01 RX ADMIN — Medication 1000 MILLIGRAM(S): at 17:48

## 2025-01-01 RX ADMIN — Medication 200 MILLIGRAM(S): at 05:11

## 2025-01-01 RX ADMIN — ENOXAPARIN SODIUM 40 MILLIGRAM(S): 100 INJECTION SUBCUTANEOUS at 13:39

## 2025-01-01 RX ADMIN — POTASSIUM CHLORIDE, DEXTROSE MONOHYDRATE AND SODIUM CHLORIDE 50 MILLILITER(S): 150; 5; 900 INJECTION, SOLUTION INTRAVENOUS at 09:19

## 2025-01-01 RX ADMIN — Medication 40 MILLIEQUIVALENT(S): at 17:14

## 2025-01-01 RX ADMIN — Medication 100 MILLIEQUIVALENT(S): at 13:31

## 2025-01-01 RX ADMIN — Medication 400 MILLIGRAM(S): at 18:00

## 2025-01-01 RX ADMIN — Medication 100 MILLIGRAM(S): at 05:44

## 2025-01-01 RX ADMIN — Medication 40 MILLIEQUIVALENT(S): at 17:47

## 2025-01-01 RX ADMIN — Medication 100 MILLIEQUIVALENT(S): at 05:32

## 2025-01-01 RX ADMIN — GLYCOPYRROLATE 0.4 MILLIGRAM(S): 0.2 INJECTION INTRAMUSCULAR; INTRAVENOUS at 12:18

## 2025-01-01 RX ADMIN — Medication 25 GRAM(S): at 13:40

## 2025-01-01 RX ADMIN — Medication 1 APPLICATION(S): at 11:53

## 2025-01-01 RX ADMIN — ENOXAPARIN SODIUM 40 MILLIGRAM(S): 100 INJECTION SUBCUTANEOUS at 05:47

## 2025-01-01 RX ADMIN — Medication 400 MILLIGRAM(S): at 18:49

## 2025-01-01 RX ADMIN — Medication 400 MILLIGRAM(S): at 06:13

## 2025-01-01 RX ADMIN — POTASSIUM CHLORIDE, DEXTROSE MONOHYDRATE AND SODIUM CHLORIDE 100 MILLILITER(S): 150; 5; 900 INJECTION, SOLUTION INTRAVENOUS at 07:59

## 2025-01-01 RX ADMIN — OXYCODONE HYDROCHLORIDE 10 MILLIGRAM(S): 30 TABLET ORAL at 00:28

## 2025-01-01 RX ADMIN — Medication 1 MILLIGRAM(S): at 22:12

## 2025-01-01 RX ADMIN — POTASSIUM PHOSPHATE, MONOBASIC POTASSIUM PHOSPHATE, DIBASIC INJECTION, 62.5 MILLIMOLE(S): 236; 224 SOLUTION, CONCENTRATE INTRAVENOUS at 11:17

## 2025-01-01 RX ADMIN — Medication 400 MILLIGRAM(S): at 15:05

## 2025-01-01 RX ADMIN — Medication 1000 MILLIGRAM(S): at 19:36

## 2025-01-01 RX ADMIN — Medication 25 GRAM(S): at 23:39

## 2025-01-01 RX ADMIN — POTASSIUM PHOSPHATE, MONOBASIC POTASSIUM PHOSPHATE, DIBASIC INJECTION, 62.5 MILLIMOLE(S): 236; 224 SOLUTION, CONCENTRATE INTRAVENOUS at 10:05

## 2025-01-01 RX ADMIN — Medication 100 MILLILITER(S): at 12:09

## 2025-01-01 RX ADMIN — METOPROLOL SUCCINATE 25 MILLIGRAM(S): 50 TABLET, EXTENDED RELEASE ORAL at 06:29

## 2025-01-01 RX ADMIN — Medication 400 MILLIGRAM(S): at 00:08

## 2025-01-01 RX ADMIN — Medication 1 PACKET(S): at 12:27

## 2025-01-01 RX ADMIN — Medication 1000 MILLIGRAM(S): at 23:41

## 2025-01-01 RX ADMIN — ENOXAPARIN SODIUM 40 MILLIGRAM(S): 100 INJECTION SUBCUTANEOUS at 12:45

## 2025-01-01 RX ADMIN — Medication 40 MILLIEQUIVALENT(S): at 13:17

## 2025-01-01 RX ADMIN — Medication 25 GRAM(S): at 22:28

## 2025-01-01 RX ADMIN — Medication 25 GRAM(S): at 13:33

## 2025-01-01 RX ADMIN — Medication 5 UNIT(S): at 04:08

## 2025-01-01 RX ADMIN — POTASSIUM CHLORIDE, DEXTROSE MONOHYDRATE AND SODIUM CHLORIDE 100 MILLILITER(S): 150; 5; 900 INJECTION, SOLUTION INTRAVENOUS at 19:44

## 2025-01-01 RX ADMIN — Medication 400 MILLIGRAM(S): at 18:05

## 2025-01-01 RX ADMIN — Medication 400 MILLIGRAM(S): at 16:03

## 2025-01-01 RX ADMIN — ENOXAPARIN SODIUM 40 MILLIGRAM(S): 100 INJECTION SUBCUTANEOUS at 11:09

## 2025-01-01 RX ADMIN — ENOXAPARIN SODIUM 40 MILLIGRAM(S): 100 INJECTION SUBCUTANEOUS at 11:53

## 2025-01-01 RX ADMIN — Medication 200 GRAM(S): at 11:52

## 2025-01-01 RX ADMIN — Medication 100 GRAM(S): at 10:07

## 2025-01-02 ENCOUNTER — NON-APPOINTMENT (OUTPATIENT)
Age: 75
End: 2025-01-02

## 2025-01-03 ENCOUNTER — RESULT REVIEW (OUTPATIENT)
Age: 75
End: 2025-01-03

## 2025-01-03 ENCOUNTER — APPOINTMENT (OUTPATIENT)
Dept: HEMATOLOGY ONCOLOGY | Facility: CLINIC | Age: 75
End: 2025-01-03
Payer: MEDICARE

## 2025-01-03 ENCOUNTER — APPOINTMENT (OUTPATIENT)
Dept: INFUSION THERAPY | Facility: HOSPITAL | Age: 75
End: 2025-01-03

## 2025-01-03 DIAGNOSIS — R19.5 OTHER FECAL ABNORMALITIES: ICD-10-CM

## 2025-01-03 DIAGNOSIS — C77.0 SECONDARY AND UNSPECIFIED MALIGNANT NEOPLASM OF LYMPH NODES OF HEAD, FACE AND NECK: ICD-10-CM

## 2025-01-03 DIAGNOSIS — R07.0 PAIN IN THROAT: ICD-10-CM

## 2025-01-03 DIAGNOSIS — K11.7 DISTURBANCES OF SALIVARY SECRETION: ICD-10-CM

## 2025-01-03 DIAGNOSIS — R05.9 COUGH, UNSPECIFIED: ICD-10-CM

## 2025-01-03 DIAGNOSIS — G89.3 NEOPLASM RELATED PAIN (ACUTE) (CHRONIC): ICD-10-CM

## 2025-01-03 LAB
ALBUMIN SERPL ELPH-MCNC: 3.9 G/DL — SIGNIFICANT CHANGE UP (ref 3.3–5)
ALP SERPL-CCNC: 56 U/L — SIGNIFICANT CHANGE UP (ref 40–120)
ALT FLD-CCNC: 13 U/L — SIGNIFICANT CHANGE UP (ref 10–45)
ANION GAP SERPL CALC-SCNC: 13 MMOL/L — SIGNIFICANT CHANGE UP (ref 5–17)
AST SERPL-CCNC: 37 U/L — SIGNIFICANT CHANGE UP (ref 10–40)
BILIRUB SERPL-MCNC: 0.4 MG/DL — SIGNIFICANT CHANGE UP (ref 0.2–1.2)
BUN SERPL-MCNC: 29 MG/DL — HIGH (ref 7–23)
CALCIUM SERPL-MCNC: 9.7 MG/DL — SIGNIFICANT CHANGE UP (ref 8.4–10.5)
CHLORIDE SERPL-SCNC: 99 MMOL/L — SIGNIFICANT CHANGE UP (ref 96–108)
CO2 SERPL-SCNC: 22 MMOL/L — SIGNIFICANT CHANGE UP (ref 22–31)
CREAT SERPL-MCNC: 1.09 MG/DL — SIGNIFICANT CHANGE UP (ref 0.5–1.3)
EGFR: 72 ML/MIN/1.73M2 — SIGNIFICANT CHANGE UP
GLUCOSE SERPL-MCNC: 117 MG/DL — HIGH (ref 70–99)
MAGNESIUM SERPL-MCNC: 1.8 MG/DL — SIGNIFICANT CHANGE UP (ref 1.6–2.6)
POTASSIUM SERPL-MCNC: 5.1 MMOL/L — SIGNIFICANT CHANGE UP (ref 3.5–5.3)
POTASSIUM SERPL-SCNC: 5.1 MMOL/L — SIGNIFICANT CHANGE UP (ref 3.5–5.3)
PROT SERPL-MCNC: 7 G/DL — SIGNIFICANT CHANGE UP (ref 6–8.3)
SODIUM SERPL-SCNC: 134 MMOL/L — LOW (ref 135–145)

## 2025-01-03 PROCEDURE — 77014: CPT | Mod: 26

## 2025-01-03 PROCEDURE — 99214 OFFICE O/P EST MOD 30 MIN: CPT

## 2025-01-03 RX ORDER — BENZONATATE 100 MG/1
100 CAPSULE ORAL
Qty: 15 | Refills: 0 | Status: ACTIVE | COMMUNITY
Start: 2025-01-03 | End: 1900-01-01

## 2025-01-03 RX ORDER — GUAIFENESIN SYRUP AND DEXTROMETHORPHAN 100; 10 MG/5ML; MG/5ML
100-10 SYRUP ORAL
Qty: 240 | Refills: 0 | Status: ACTIVE | COMMUNITY
Start: 2025-01-03 | End: 1900-01-01

## 2025-01-04 LAB
FLUAV AG NPH QL: DETECTED
FLUBV AG NPH QL: SIGNIFICANT CHANGE UP
RSV RNA NPH QL NAA+NON-PROBE: SIGNIFICANT CHANGE UP
SARS-COV-2 RNA SPEC QL NAA+PROBE: SIGNIFICANT CHANGE UP

## 2025-01-04 PROCEDURE — G6002: CPT | Mod: 26

## 2025-01-06 ENCOUNTER — NON-APPOINTMENT (OUTPATIENT)
Age: 75
End: 2025-01-06

## 2025-01-06 DIAGNOSIS — J10.1 INFLUENZA DUE TO OTHER IDENTIFIED INFLUENZA VIRUS WITH OTHER RESPIRATORY MANIFESTATIONS: ICD-10-CM

## 2025-01-06 RX ORDER — OSELTAMIVIR PHOSPHATE 75 MG/1
75 CAPSULE ORAL TWICE DAILY
Qty: 10 | Refills: 0 | Status: ACTIVE | COMMUNITY
Start: 2025-01-06 | End: 1900-01-01

## 2025-01-07 ENCOUNTER — APPOINTMENT (OUTPATIENT)
Dept: OTOLARYNGOLOGY | Facility: CLINIC | Age: 75
End: 2025-01-07
Payer: MEDICARE

## 2025-01-07 ENCOUNTER — APPOINTMENT (OUTPATIENT)
Dept: INFUSION THERAPY | Facility: HOSPITAL | Age: 75
End: 2025-01-07

## 2025-01-07 ENCOUNTER — NON-APPOINTMENT (OUTPATIENT)
Age: 75
End: 2025-01-07

## 2025-01-07 ENCOUNTER — APPOINTMENT (OUTPATIENT)
Dept: HEMATOLOGY ONCOLOGY | Facility: CLINIC | Age: 75
End: 2025-01-07
Payer: MEDICARE

## 2025-01-07 ENCOUNTER — RESULT REVIEW (OUTPATIENT)
Age: 75
End: 2025-01-07

## 2025-01-07 VITALS — HEIGHT: 67 IN | WEIGHT: 175 LBS | BODY MASS INDEX: 27.47 KG/M2

## 2025-01-07 VITALS
DIASTOLIC BLOOD PRESSURE: 73 MMHG | HEIGHT: 67 IN | RESPIRATION RATE: 17 BRPM | BODY MASS INDEX: 26.42 KG/M2 | TEMPERATURE: 97.4 F | WEIGHT: 168.31 LBS | OXYGEN SATURATION: 96 % | SYSTOLIC BLOOD PRESSURE: 136 MMHG | HEART RATE: 78 BPM

## 2025-01-07 DIAGNOSIS — H65.21 CHRONIC SEROUS OTITIS MEDIA, RIGHT EAR: ICD-10-CM

## 2025-01-07 DIAGNOSIS — H90.3 SENSORINEURAL HEARING LOSS, BILATERAL: ICD-10-CM

## 2025-01-07 DIAGNOSIS — H69.91 UNSPECIFIED EUSTACHIAN TUBE DISORDER, RIGHT EAR: ICD-10-CM

## 2025-01-07 DIAGNOSIS — D64.81 ANEMIA DUE TO ANTINEOPLASTIC CHEMOTHERAPY: ICD-10-CM

## 2025-01-07 DIAGNOSIS — T45.1X5A ANEMIA DUE TO ANTINEOPLASTIC CHEMOTHERAPY: ICD-10-CM

## 2025-01-07 LAB
BASOPHILS # BLD AUTO: 0.01 K/UL — SIGNIFICANT CHANGE UP (ref 0–0.2)
BASOPHILS NFR BLD AUTO: 0.2 % — SIGNIFICANT CHANGE UP (ref 0–2)
EOSINOPHIL # BLD AUTO: 0.01 K/UL — SIGNIFICANT CHANGE UP (ref 0–0.5)
EOSINOPHIL NFR BLD AUTO: 0.2 % — SIGNIFICANT CHANGE UP (ref 0–6)
HCT VFR BLD CALC: 29.4 % — LOW (ref 39–50)
HGB BLD-MCNC: 10.1 G/DL — LOW (ref 13–17)
IMM GRANULOCYTES NFR BLD AUTO: 0.7 % — SIGNIFICANT CHANGE UP (ref 0–0.9)
LYMPHOCYTES # BLD AUTO: 0.58 K/UL — LOW (ref 1–3.3)
LYMPHOCYTES # BLD AUTO: 13.4 % — SIGNIFICANT CHANGE UP (ref 13–44)
MCHC RBC-ENTMCNC: 34 PG — SIGNIFICANT CHANGE UP (ref 27–34)
MCHC RBC-ENTMCNC: 34.4 G/DL — SIGNIFICANT CHANGE UP (ref 32–36)
MCV RBC AUTO: 99 FL — SIGNIFICANT CHANGE UP (ref 80–100)
MONOCYTES # BLD AUTO: 0.85 K/UL — SIGNIFICANT CHANGE UP (ref 0–0.9)
MONOCYTES NFR BLD AUTO: 19.7 % — HIGH (ref 2–14)
NEUTROPHILS # BLD AUTO: 2.84 K/UL — SIGNIFICANT CHANGE UP (ref 1.8–7.4)
NEUTROPHILS NFR BLD AUTO: 65.8 % — SIGNIFICANT CHANGE UP (ref 43–77)
NRBC # BLD: 0 /100 WBCS — SIGNIFICANT CHANGE UP (ref 0–0)
PLATELET # BLD AUTO: 134 K/UL — LOW (ref 150–400)
RBC # BLD: 2.97 M/UL — LOW (ref 4.2–5.8)
RBC # FLD: 13.7 % — SIGNIFICANT CHANGE UP (ref 10.3–14.5)
WBC # BLD: 4.32 K/UL — SIGNIFICANT CHANGE UP (ref 3.8–10.5)
WBC # FLD AUTO: 4.32 K/UL — SIGNIFICANT CHANGE UP (ref 3.8–10.5)

## 2025-01-07 PROCEDURE — 92504 EAR MICROSCOPY EXAMINATION: CPT

## 2025-01-07 PROCEDURE — G2211 COMPLEX E/M VISIT ADD ON: CPT

## 2025-01-07 PROCEDURE — 99214 OFFICE O/P EST MOD 30 MIN: CPT

## 2025-01-07 PROCEDURE — G6002: CPT | Mod: 26

## 2025-01-07 PROCEDURE — 99213 OFFICE O/P EST LOW 20 MIN: CPT | Mod: 25

## 2025-01-08 ENCOUNTER — NON-APPOINTMENT (OUTPATIENT)
Age: 75
End: 2025-01-08

## 2025-01-08 PROCEDURE — G6002: CPT | Mod: 26

## 2025-01-09 PROBLEM — D64.81 ANEMIA DUE TO ANTINEOPLASTIC CHEMOTHERAPY: Status: ACTIVE | Noted: 2025-01-09

## 2025-01-10 ENCOUNTER — RESULT REVIEW (OUTPATIENT)
Age: 75
End: 2025-01-10

## 2025-01-10 ENCOUNTER — APPOINTMENT (OUTPATIENT)
Dept: INFUSION THERAPY | Facility: HOSPITAL | Age: 75
End: 2025-01-10

## 2025-01-10 ENCOUNTER — NON-APPOINTMENT (OUTPATIENT)
Age: 75
End: 2025-01-10

## 2025-01-10 DIAGNOSIS — K12.30 ORAL MUCOSITIS (ULCERATIVE), UNSPECIFIED: ICD-10-CM

## 2025-01-10 LAB
ANION GAP SERPL CALC-SCNC: 12 MMOL/L — SIGNIFICANT CHANGE UP (ref 5–17)
BUN SERPL-MCNC: 23 MG/DL — SIGNIFICANT CHANGE UP (ref 7–23)
CALCIUM SERPL-MCNC: 9.9 MG/DL — SIGNIFICANT CHANGE UP (ref 8.4–10.5)
CHLORIDE SERPL-SCNC: 101 MMOL/L — SIGNIFICANT CHANGE UP (ref 96–108)
CO2 SERPL-SCNC: 24 MMOL/L — SIGNIFICANT CHANGE UP (ref 22–31)
CREAT SERPL-MCNC: 1 MG/DL — SIGNIFICANT CHANGE UP (ref 0.5–1.3)
EGFR: 80 ML/MIN/1.73M2 — SIGNIFICANT CHANGE UP
GLUCOSE SERPL-MCNC: 151 MG/DL — HIGH (ref 70–99)
MAGNESIUM SERPL-MCNC: 1.8 MG/DL — SIGNIFICANT CHANGE UP (ref 1.6–2.6)
POTASSIUM SERPL-MCNC: 4.1 MMOL/L — SIGNIFICANT CHANGE UP (ref 3.5–5.3)
POTASSIUM SERPL-SCNC: 4.1 MMOL/L — SIGNIFICANT CHANGE UP (ref 3.5–5.3)
SODIUM SERPL-SCNC: 137 MMOL/L — SIGNIFICANT CHANGE UP (ref 135–145)

## 2025-01-10 PROCEDURE — 77014: CPT | Mod: 26

## 2025-01-10 PROCEDURE — 77427 RADIATION TX MANAGEMENT X5: CPT

## 2025-01-10 RX ORDER — DIPHENHYDRAMINE HYDROCHLORIDE AND LIDOCAINE HYDROCHLORIDE AND ALUMINUM HYDROXIDE AND MAGNESIUM HYDRO
KIT EVERY 4 HOURS
Qty: 1 | Refills: 4 | Status: ACTIVE | COMMUNITY
Start: 2025-01-10 | End: 1900-01-01

## 2025-01-10 RX ORDER — DOXEPIN HYDROCHLORIDE 10 MG/ML
10 SOLUTION ORAL
Qty: 200 | Refills: 2 | Status: ACTIVE | COMMUNITY
Start: 2025-01-10 | End: 1900-01-01

## 2025-01-10 RX ORDER — OXYCODONE 5 MG/1
5 TABLET ORAL
Qty: 50 | Refills: 0 | Status: ACTIVE | COMMUNITY
Start: 2025-01-10 | End: 1900-01-01

## 2025-01-11 PROCEDURE — G6002: CPT | Mod: 26

## 2025-01-13 ENCOUNTER — OUTPATIENT (OUTPATIENT)
Dept: OUTPATIENT SERVICES | Facility: HOSPITAL | Age: 75
LOS: 1 days | Discharge: HOME | End: 2025-01-13

## 2025-01-13 DIAGNOSIS — Z96.651 PRESENCE OF RIGHT ARTIFICIAL KNEE JOINT: Chronic | ICD-10-CM

## 2025-01-13 DIAGNOSIS — Z98.890 OTHER SPECIFIED POSTPROCEDURAL STATES: Chronic | ICD-10-CM

## 2025-01-13 DIAGNOSIS — Z98.49 CATARACT EXTRACTION STATUS, UNSPECIFIED EYE: Chronic | ICD-10-CM

## 2025-01-13 DIAGNOSIS — C11.9 MALIGNANT NEOPLASM OF NASOPHARYNX, UNSPECIFIED: ICD-10-CM

## 2025-01-13 PROCEDURE — G6002: CPT | Mod: 26

## 2025-01-14 ENCOUNTER — APPOINTMENT (OUTPATIENT)
Dept: INFUSION THERAPY | Facility: HOSPITAL | Age: 75
End: 2025-01-14

## 2025-01-14 ENCOUNTER — APPOINTMENT (OUTPATIENT)
Dept: HEMATOLOGY ONCOLOGY | Facility: CLINIC | Age: 75
End: 2025-01-14
Payer: MEDICARE

## 2025-01-14 ENCOUNTER — OUTPATIENT (OUTPATIENT)
Dept: OUTPATIENT SERVICES | Facility: HOSPITAL | Age: 75
LOS: 1 days | End: 2025-01-14

## 2025-01-14 ENCOUNTER — RESULT REVIEW (OUTPATIENT)
Age: 75
End: 2025-01-14

## 2025-01-14 ENCOUNTER — APPOINTMENT (OUTPATIENT)
Dept: THORACIC SURGERY | Facility: CLINIC | Age: 75
End: 2025-01-14
Payer: MEDICARE

## 2025-01-14 VITALS
DIASTOLIC BLOOD PRESSURE: 75 MMHG | RESPIRATION RATE: 14 BRPM | WEIGHT: 164.02 LBS | SYSTOLIC BLOOD PRESSURE: 131 MMHG | HEIGHT: 67 IN | OXYGEN SATURATION: 97 % | TEMPERATURE: 97 F | HEART RATE: 99 BPM

## 2025-01-14 VITALS
OXYGEN SATURATION: 98 % | DIASTOLIC BLOOD PRESSURE: 77 MMHG | WEIGHT: 163 LBS | HEART RATE: 104 BPM | BODY MASS INDEX: 25.58 KG/M2 | RESPIRATION RATE: 16 BRPM | HEIGHT: 67 IN | SYSTOLIC BLOOD PRESSURE: 105 MMHG

## 2025-01-14 DIAGNOSIS — Z98.49 CATARACT EXTRACTION STATUS, UNSPECIFIED EYE: Chronic | ICD-10-CM

## 2025-01-14 DIAGNOSIS — K12.30 ORAL MUCOSITIS (ULCERATIVE), UNSPECIFIED: ICD-10-CM

## 2025-01-14 DIAGNOSIS — Z98.890 OTHER SPECIFIED POSTPROCEDURAL STATES: Chronic | ICD-10-CM

## 2025-01-14 DIAGNOSIS — R20.2 PARESTHESIA OF SKIN: ICD-10-CM

## 2025-01-14 DIAGNOSIS — C11.9 MALIGNANT NEOPLASM OF NASOPHARYNX, UNSPECIFIED: ICD-10-CM

## 2025-01-14 DIAGNOSIS — L58.9 RADIODERMATITIS, UNSPECIFIED: ICD-10-CM

## 2025-01-14 DIAGNOSIS — Z96.651 PRESENCE OF RIGHT ARTIFICIAL KNEE JOINT: Chronic | ICD-10-CM

## 2025-01-14 DIAGNOSIS — Z96.652 PRESENCE OF LEFT ARTIFICIAL KNEE JOINT: Chronic | ICD-10-CM

## 2025-01-14 DIAGNOSIS — R05.9 COUGH, UNSPECIFIED: ICD-10-CM

## 2025-01-14 PROBLEM — H93.13 TINNITUS OF BOTH EARS: Status: ACTIVE | Noted: 2025-01-14

## 2025-01-14 LAB
BASOPHILS # BLD AUTO: 0.02 K/UL — SIGNIFICANT CHANGE UP (ref 0–0.2)
BASOPHILS NFR BLD AUTO: 0.5 % — SIGNIFICANT CHANGE UP (ref 0–2)
EOSINOPHIL # BLD AUTO: 0.04 K/UL — SIGNIFICANT CHANGE UP (ref 0–0.5)
EOSINOPHIL NFR BLD AUTO: 1 % — SIGNIFICANT CHANGE UP (ref 0–6)
HCT VFR BLD CALC: 28.8 % — LOW (ref 39–50)
HGB BLD-MCNC: 9.8 G/DL — LOW (ref 13–17)
IMM GRANULOCYTES NFR BLD AUTO: 0.5 % — SIGNIFICANT CHANGE UP (ref 0–0.9)
LYMPHOCYTES # BLD AUTO: 0.52 K/UL — LOW (ref 1–3.3)
LYMPHOCYTES # BLD AUTO: 13 % — SIGNIFICANT CHANGE UP (ref 13–44)
MCHC RBC-ENTMCNC: 34 G/DL — SIGNIFICANT CHANGE UP (ref 32–36)
MCHC RBC-ENTMCNC: 34.5 PG — HIGH (ref 27–34)
MCV RBC AUTO: 101.4 FL — HIGH (ref 80–100)
MONOCYTES # BLD AUTO: 0.39 K/UL — SIGNIFICANT CHANGE UP (ref 0–0.9)
MONOCYTES NFR BLD AUTO: 9.7 % — SIGNIFICANT CHANGE UP (ref 2–14)
NEUTROPHILS # BLD AUTO: 3.02 K/UL — SIGNIFICANT CHANGE UP (ref 1.8–7.4)
NEUTROPHILS NFR BLD AUTO: 75.3 % — SIGNIFICANT CHANGE UP (ref 43–77)
NRBC # BLD: 0 /100 WBCS — SIGNIFICANT CHANGE UP (ref 0–0)
NRBC BLD-RTO: 0 /100 WBCS — SIGNIFICANT CHANGE UP (ref 0–0)
PLATELET # BLD AUTO: 124 K/UL — LOW (ref 150–400)
RBC # BLD: 2.84 M/UL — LOW (ref 4.2–5.8)
RBC # FLD: 13.2 % — SIGNIFICANT CHANGE UP (ref 10.3–14.5)
WBC # BLD: 4.01 K/UL — SIGNIFICANT CHANGE UP (ref 3.8–10.5)
WBC # FLD AUTO: 4.01 K/UL — SIGNIFICANT CHANGE UP (ref 3.8–10.5)

## 2025-01-14 PROCEDURE — 99214 OFFICE O/P EST MOD 30 MIN: CPT

## 2025-01-14 PROCEDURE — 99205 OFFICE O/P NEW HI 60 MIN: CPT

## 2025-01-14 PROCEDURE — G6002: CPT | Mod: 26

## 2025-01-14 RX ORDER — ACETAMINOPHEN 80 MG/.8ML
2 SOLUTION/ DROPS ORAL
Refills: 0 | DISCHARGE

## 2025-01-14 NOTE — H&P PST ADULT - HISTORY OF PRESENT ILLNESS
75y/o male scheduled for upper endoscopy, percutaneous endoscopic gastrostomy tube placement on 1/17/2024.  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  C/o loss of appetite, lost 20 pounds since 8/2024, difficulty swallowing solids."       75y/o male scheduled for upper endoscopy, percutaneous endoscopic gastrostomy tube placement on 1/17/2024.  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  C/o loss of appetite, lost 20 pounds since 8/2024, difficulty swallowing solids."

## 2025-01-14 NOTE — H&P PST ADULT - NSICDXPASTMEDICALHX_GEN_ALL_CORE_FT
PAST MEDICAL HISTORY:  Anxiety     BPH with elevated PSA     Hyperlipidemia     Osteoarthritis      PAST MEDICAL HISTORY:  Anxiety     BPH with elevated PSA     Cancer of sinus     History of chemotherapy     Hyperlipidemia     Malignant neoplasm of nasopharynx     Osteoarthritis     S/P radiation therapy

## 2025-01-14 NOTE — H&P PST ADULT - PROBLEM SELECTOR PLAN 1
Pt scheduled for upper endoscopy, percutaneous endoscopic gastrostomy tube placement on 1/17/2024.  labs cbc, bmp done today in oncology results in sunrise,   Chlorhexidine provided-  written and verbal instructions given, with teach back, pt able to verbalize understanding.  Preop teaching done, pt able to verbalize understanding.   medication day of procedure-  omeprazole, oxycodone if needed   anesthesia-  GARCIA precautions.

## 2025-01-14 NOTE — H&P PST ADULT - GASTROINTESTINAL
soft/nontender/nondistended/normal active bowel sounds/no guarding/no rigidity details… soft/nontender/nondistended/normal active bowel sounds/no guarding/no rigidity/no organomegaly/no palpable richard/no masses palpable

## 2025-01-14 NOTE — H&P PST ADULT - CARDIOVASCULAR
details… regular rate and rhythm/S1 S2 present/vascular regular rate and rhythm/S1 S2 present/no gallops/no rub/no murmur/no JVD/no pedal edema

## 2025-01-14 NOTE — H&P PST ADULT - ENMT COMMENTS
FROM of neck pre op dx-  right nasopharyngeal mass and right cervical adenopathy advanced nasopharyngeal cancer,  upper removable denture, lower removable bridge full rom of neck mal 4 (not wearing dentures) advanced nasopharyngeal cancer  currently receiving chemo and radiation,  upper removable denture, lower removable bridge

## 2025-01-14 NOTE — H&P PST ADULT - LYMPHATICS COMMENTS
right lateral cervical lymph node enlargement- s/p biopsy with mild ecchymotic area on right lateral neck enlarged right cervical lymph node

## 2025-01-14 NOTE — H&P PST ADULT - NSICDXPASTSURGICALHX_GEN_ALL_CORE_FT
PAST SURGICAL HISTORY:  H/O cervical biopsy     H/O colonoscopy     History of knee replacement procedure of right knee     S/P cataract surgery     S/P total knee replacement, left      PAST SURGICAL HISTORY:  H/O cervical biopsy     H/O colonoscopy     History of incisional hernia repair     History of knee replacement procedure of right knee     S/P cataract surgery     S/P total knee replacement, left

## 2025-01-15 ENCOUNTER — NON-APPOINTMENT (OUTPATIENT)
Age: 75
End: 2025-01-15

## 2025-01-15 DIAGNOSIS — E86.0 DEHYDRATION: ICD-10-CM

## 2025-01-15 DIAGNOSIS — R11.2 NAUSEA WITH VOMITING, UNSPECIFIED: ICD-10-CM

## 2025-01-15 DIAGNOSIS — Z51.11 ENCOUNTER FOR ANTINEOPLASTIC CHEMOTHERAPY: ICD-10-CM

## 2025-01-16 ENCOUNTER — NON-APPOINTMENT (OUTPATIENT)
Age: 75
End: 2025-01-16

## 2025-01-16 VITALS
SYSTOLIC BLOOD PRESSURE: 114 MMHG | TEMPERATURE: 97.6 F | BODY MASS INDEX: 25.52 KG/M2 | HEART RATE: 84 BPM | DIASTOLIC BLOOD PRESSURE: 69 MMHG | RESPIRATION RATE: 17 BRPM | WEIGHT: 162.59 LBS | OXYGEN SATURATION: 96 % | HEIGHT: 67 IN

## 2025-01-16 PROCEDURE — G6002: CPT | Mod: 26

## 2025-01-16 RX ORDER — NUTRITIONAL SUPPLEMENT/FIBER 0.06 G-1.4
LIQUID (ML) ORAL
Qty: 150 | Refills: 5 | Status: ACTIVE | COMMUNITY
Start: 2025-01-16 | End: 1900-01-01

## 2025-01-16 NOTE — ASU PATIENT PROFILE, ADULT - FALL HARM RISK - UNIVERSAL INTERVENTIONS
Bed in lowest position, wheels locked, appropriate side rails in place/Call bell, personal items and telephone in reach/Instruct patient to call for assistance before getting out of bed or chair/Non-slip footwear when patient is out of bed/[place] to call system/Physically safe environment - no spills, clutter or unnecessary equipment/Purposeful Proactive Rounding/Room/bathroom lighting operational, light cord in reach

## 2025-01-16 NOTE — ASU PATIENT PROFILE, ADULT - NSICDXPASTMEDICALHX_GEN_ALL_CORE_FT
PAST MEDICAL HISTORY:  Anxiety     BPH with elevated PSA     Cancer of sinus     History of chemotherapy     Hyperlipidemia     Malignant neoplasm of nasopharynx     Osteoarthritis     S/P radiation therapy

## 2025-01-16 NOTE — ASU PATIENT PROFILE, ADULT - NSICDXPASTSURGICALHX_GEN_ALL_CORE_FT
PAST SURGICAL HISTORY:  H/O cervical biopsy     H/O colonoscopy     History of incisional hernia repair     History of knee replacement procedure of right knee     S/P cataract surgery     S/P total knee replacement, left

## 2025-01-17 ENCOUNTER — NON-APPOINTMENT (OUTPATIENT)
Age: 75
End: 2025-01-17

## 2025-01-17 ENCOUNTER — APPOINTMENT (OUTPATIENT)
Dept: THORACIC SURGERY | Facility: HOSPITAL | Age: 75
End: 2025-01-17

## 2025-01-17 ENCOUNTER — APPOINTMENT (OUTPATIENT)
Dept: INFUSION THERAPY | Facility: HOSPITAL | Age: 75
End: 2025-01-17

## 2025-01-17 ENCOUNTER — OUTPATIENT (OUTPATIENT)
Dept: OUTPATIENT SERVICES | Facility: HOSPITAL | Age: 75
LOS: 1 days | Discharge: HOME | End: 2025-01-17
Payer: MEDICARE

## 2025-01-17 VITALS
RESPIRATION RATE: 16 BRPM | DIASTOLIC BLOOD PRESSURE: 85 MMHG | HEART RATE: 79 BPM | OXYGEN SATURATION: 99 % | SYSTOLIC BLOOD PRESSURE: 111 MMHG

## 2025-01-17 VITALS
HEART RATE: 89 BPM | TEMPERATURE: 97 F | RESPIRATION RATE: 16 BRPM | WEIGHT: 160.06 LBS | DIASTOLIC BLOOD PRESSURE: 71 MMHG | HEIGHT: 67 IN | SYSTOLIC BLOOD PRESSURE: 110 MMHG | OXYGEN SATURATION: 97 %

## 2025-01-17 DIAGNOSIS — Z98.890 OTHER SPECIFIED POSTPROCEDURAL STATES: Chronic | ICD-10-CM

## 2025-01-17 DIAGNOSIS — C11.9 MALIGNANT NEOPLASM OF NASOPHARYNX, UNSPECIFIED: ICD-10-CM

## 2025-01-17 DIAGNOSIS — Z96.652 PRESENCE OF LEFT ARTIFICIAL KNEE JOINT: Chronic | ICD-10-CM

## 2025-01-17 DIAGNOSIS — Z98.49 CATARACT EXTRACTION STATUS, UNSPECIFIED EYE: Chronic | ICD-10-CM

## 2025-01-17 DIAGNOSIS — Z96.651 PRESENCE OF RIGHT ARTIFICIAL KNEE JOINT: Chronic | ICD-10-CM

## 2025-01-17 PROCEDURE — 43246 EGD PLACE GASTROSTOMY TUBE: CPT | Mod: AS

## 2025-01-17 PROCEDURE — 43246 EGD PLACE GASTROSTOMY TUBE: CPT

## 2025-01-17 DEVICE — FEEDING TUBE PEG KIT MIC 20FR PULL: Type: IMPLANTABLE DEVICE | Status: FUNCTIONAL

## 2025-01-17 RX ORDER — ONDANSETRON 4 MG/1
4 TABLET ORAL ONCE
Refills: 0 | Status: DISCONTINUED | OUTPATIENT
Start: 2025-01-17 | End: 2025-01-17

## 2025-01-17 RX ORDER — FENTANYL 75 UG/H
25 PATCH, EXTENDED RELEASE TRANSDERMAL
Refills: 0 | Status: DISCONTINUED | OUTPATIENT
Start: 2025-01-17 | End: 2025-01-17

## 2025-01-17 RX ORDER — FENTANYL 75 UG/H
50 PATCH, EXTENDED RELEASE TRANSDERMAL ONCE
Refills: 0 | Status: DISCONTINUED | OUTPATIENT
Start: 2025-01-17 | End: 2025-01-17

## 2025-01-17 RX ORDER — ACETAMINOPHEN 80 MG/.8ML
1000 SOLUTION/ DROPS ORAL ONCE
Refills: 0 | Status: COMPLETED | OUTPATIENT
Start: 2025-01-17 | End: 2025-01-17

## 2025-01-17 RX ADMIN — ACETAMINOPHEN 400 MILLIGRAM(S): 80 SOLUTION/ DROPS ORAL at 14:39

## 2025-01-17 NOTE — BRIEF OPERATIVE NOTE - OPERATION/FINDINGS
EGD with transillumination through stomach to abdominal wall, needle advanced in area of transillumination, wire threaded and put tube pulled through, secured with bumper at skin. Bumper to be loosened in 48 hours.

## 2025-01-17 NOTE — ASU DISCHARGE PLAN (ADULT/PEDIATRIC) - ASU DC SPECIAL INSTRUCTIONSFT
You must follow up for PEG loosening in 48hrs, please return to ED and ask for Dr. Holloway's team/Thoracic for PEG loosening.    Follow up with your PCP and ENT doctors as needed, call for appointments.

## 2025-01-17 NOTE — ASU DISCHARGE PLAN (ADULT/PEDIATRIC) - FINANCIAL ASSISTANCE
[facility] provides services at a reduced cost to those who are determined to be eligible through [facility]’s financial assistance program. Information regarding [facility]’s financial assistance program can be found by going to [email or web address] or by calling [phone].

## 2025-01-17 NOTE — BRIEF OPERATIVE NOTE - COMMENTS
STEFAN Stanley, provided direct first assist support to the surgeon during this surgical procedure. My involvement included positioning, prepping and draping the patient prior to surgery, ensuring clear visibility and exposure for the surgeon by using instruments such as retractors and suction, closing surgical incisions and dressing wounds. As well as other tasks as directed by the surgeon.

## 2025-01-17 NOTE — ASU DISCHARGE PLAN (ADULT/PEDIATRIC) - CARE PROVIDER_API CALL
Shola Holloway  Thoracic Surgery  [address] [address] [place] [address], [address] [place] [address]  Phone: [phone]  Fax: [phone]  Follow Up Time:

## 2025-01-17 NOTE — ASU DISCHARGE PLAN (ADULT/PEDIATRIC) - NS MD DC FALL RISK RISK
For information on Fall & Injury Prevention, visit: [email or web address] OR  [email or web address] OR  https://www.cdc.gov/steadi/patient.html

## 2025-01-19 ENCOUNTER — EMERGENCY (EMERGENCY)
Facility: HOSPITAL | Age: 75
LOS: 1 days | Discharge: HOME | End: 2025-01-19
Attending: EMERGENCY MEDICINE | Admitting: EMERGENCY MEDICINE
Payer: MEDICARE

## 2025-01-19 VITALS
TEMPERATURE: 98 F | DIASTOLIC BLOOD PRESSURE: 91 MMHG | HEART RATE: 94 BPM | HEIGHT: 67 IN | OXYGEN SATURATION: 96 % | RESPIRATION RATE: 17 BRPM | WEIGHT: 158.95 LBS | SYSTOLIC BLOOD PRESSURE: 143 MMHG

## 2025-01-19 DIAGNOSIS — Z98.49 CATARACT EXTRACTION STATUS, UNSPECIFIED EYE: Chronic | ICD-10-CM

## 2025-01-19 DIAGNOSIS — Z98.890 OTHER SPECIFIED POSTPROCEDURAL STATES: Chronic | ICD-10-CM

## 2025-01-19 DIAGNOSIS — Z96.651 PRESENCE OF RIGHT ARTIFICIAL KNEE JOINT: Chronic | ICD-10-CM

## 2025-01-19 DIAGNOSIS — Z96.652 PRESENCE OF LEFT ARTIFICIAL KNEE JOINT: Chronic | ICD-10-CM

## 2025-01-19 PROBLEM — Z92.21 PERSONAL HISTORY OF ANTINEOPLASTIC CHEMOTHERAPY: Chronic | Status: ACTIVE | Noted: 2025-01-14

## 2025-01-19 PROBLEM — C11.9 MALIGNANT NEOPLASM OF NASOPHARYNX, UNSPECIFIED: Chronic | Status: ACTIVE | Noted: 2025-01-14

## 2025-01-19 PROBLEM — Z92.3 PERSONAL HISTORY OF IRRADIATION: Chronic | Status: ACTIVE | Noted: 2025-01-14

## 2025-01-19 PROCEDURE — 99283 EMERGENCY DEPT VISIT LOW MDM: CPT

## 2025-01-19 NOTE — ED PROVIDER NOTE - ATTENDING CONTRIBUTION TO CARE
I performed a history and physical exam of the patient and discussed their management with the resident and /or advanced care provider. I reviewed the resident and /or ACP's note and agree with the documented findings and plan of care. My medical decision making and observations are found above.  Lungs clear, Gtube with no redness or D/C,

## 2025-01-19 NOTE — ED PROVIDER NOTE - NSFOLLOWUPINSTRUCTIONS_ED_ALL_ED_FT
- Lab and imaging results, if performed, were discussed with you along with your discharge diagnosis    - Follow up with your doctor in 1 week - bring copies of your results if you were given. If you do not have a primary doctor, please call [phone] to find one convenient for you    - Return to the ED for any new, worsening, or concerning symptoms to you    - Continue all prescribed medications    - Take ibuprofen/tylenol as directed as needed for pain    - Rest and keep yourself hydrated with fluids

## 2025-01-19 NOTE — ED PROVIDER NOTE - PROGRESS NOTE DETAILS
Examined patient, reached out to doctor Shola Holloway on teams that directed him to come in PA from CT surgery, adjusted peg tube and now he can go home. she said

## 2025-01-19 NOTE — ED ADULT TRIAGE NOTE - CHIEF COMPLAINT QUOTE
pt had peg tube placed 2 days ago, states he was told by MD. Ha to come in to have it evaluated. pt offers no complaints at this time. when asked why peg tube was placed by responded "why do you think, to feed someone, I don't know why you need to ask me these questions and take my blood pressure" pt able to be redirected and vitals obtained.

## 2025-01-19 NOTE — CHART NOTE - NSCHARTNOTEFT_GEN_A_CORE
POD 2 s/p EGD, PEG  PEG site c,d,i  bumper loosened  pt can be discharged yanni  can follow w Dr Holloway in office as needed  d/w pt and ER staff    [place] [phone]

## 2025-01-19 NOTE — ED PROVIDER NOTE - PATIENT PORTAL LINK FT
You can access the FollowMyHealth Patient Portal offered by [facility] by registering at the following website: [email or web address]. By joining [employer]’s FollowMyHealth portal, you will also be able to view your health information using other applications (apps) compatible with our system.

## 2025-01-19 NOTE — ED PROVIDER NOTE - PHYSICAL EXAMINATION
Vital signs reviewed.  CONSTITUTIONAL: breathing, NAD  HEAD: Normocephalic; atraumatic  EYES: EOMI, PERRL, no conjunctival injection, no scleral icterus  MOUTH/THROAT:  MMM  NECK: Trachea midline, no JVD  CV: Normal S1, S2; no audible murmurs  RESP: normal work of breathing,  ABD: soft, non-distended; non-tender to palpation, Peg tube is in place with no erythema or warm around it, skin is clear around it   : Deferred  MSK/EXT: no LE edema, no limited ROM  SKIN: No rashes on exposed skin surfaces  NEURO: Moves all extremities spontaneously with no focal deficits, speech is appropriate  PSYCH: calm

## 2025-01-19 NOTE — ED PROVIDER NOTE - OBJECTIVE STATEMENT
I will call call or send [facility] message with your lab results.   Please call with any questions or concerns.    Return in about 1 year (around 8/19/2025) for Annual, Labs.    
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 He was directed to come in for evaluation today. He has no immediate complaints or concerns with the PEG tube.  - Past Medical History : Significant for radiation therapy, history of chemotherapy, malignant neoplasm of the nasopharynx, cancer of sinus, anxiety, hyperlipidemia, BPH with elevated PSA.  - Past Surgical History : Noted to have had hernia repair, cervical biopsy, colonoscopy, cataract surgery, total knee replacement.  - Family History : Not stated  - Social History :  - Occupation: Unknown  - Resident status: Unknown  - Substance use: Unknown  - Lifestyle and habits: Unknown    - Review of Systems : Patient denies any immediate complaints or concerns with the PEG tube.  - General : No complains of weight loss, weight gain, loss of appetite, fatigue, malaise, fever, chills, night sweats, or changes in energy levels.  - Neurological : No reports of headache, dizziness, vertigo, syncope, weakness, numbness, tingling, tremors, seizures or changes in coordination or balance.  - Musculoskeletal : No complaints of joint pain, stiffness, swelling, limited range of motion, muscle weakness, muscle cramps, or difficulty with ambulation.  - Cardiovascular : No declarations of chest pain, palpitations, dyspnea on exertion, orthopnea, paroxysmal nocturnal dyspnea, lower extremity edema, or changes in heart rhythm.  - Respiratory : No record of cough, dyspnea, wheezing, hemoptysis, pleuritic chest pain, sputum production, or any history of respiratory infections.  - Gastrointestinal : PEG tube in place due to inability to eat secondary to the radiation for nasopharyngeal cancer.  - Genitourinary : No mention of dysuria, frequency, urgency, hematuria, incontinence, nocturia, changes in urinary stream, or any history of kidney stones or urinary tract infections.  - Integumentary : No accounts of rashes, lesions, pruritus, changes in pigmentation, hair loss, nail changes, or any history of skin disorders or allergies.  - Psychiatric : History of anxiety.  - Medications :  - Atorvastatin  - Finasteride  - Oxycodone  - Reglan  - Tamulosin  - Tylenol  - Allergies : Not stated

## 2025-01-21 ENCOUNTER — APPOINTMENT (OUTPATIENT)
Dept: HEMATOLOGY ONCOLOGY | Facility: CLINIC | Age: 75
End: 2025-01-21
Payer: MEDICARE

## 2025-01-21 ENCOUNTER — RESULT REVIEW (OUTPATIENT)
Age: 75
End: 2025-01-21

## 2025-01-21 ENCOUNTER — APPOINTMENT (OUTPATIENT)
Dept: INFUSION THERAPY | Facility: HOSPITAL | Age: 75
End: 2025-01-21

## 2025-01-21 ENCOUNTER — NON-APPOINTMENT (OUTPATIENT)
Age: 75
End: 2025-01-21

## 2025-01-21 DIAGNOSIS — K12.30 ORAL MUCOSITIS (ULCERATIVE), UNSPECIFIED: ICD-10-CM

## 2025-01-21 DIAGNOSIS — H93.13 TINNITUS, BILATERAL: ICD-10-CM

## 2025-01-21 DIAGNOSIS — K11.7 DISTURBANCES OF SALIVARY SECRETION: ICD-10-CM

## 2025-01-21 DIAGNOSIS — R07.0 PAIN IN THROAT: ICD-10-CM

## 2025-01-21 DIAGNOSIS — B37.0 CANDIDAL STOMATITIS: ICD-10-CM

## 2025-01-21 LAB
BASOPHILS # BLD AUTO: 0.01 K/UL — SIGNIFICANT CHANGE UP (ref 0–0.2)
BASOPHILS NFR BLD AUTO: 0.3 % — SIGNIFICANT CHANGE UP (ref 0–2)
EOSINOPHIL # BLD AUTO: 0.04 K/UL — SIGNIFICANT CHANGE UP (ref 0–0.5)
EOSINOPHIL NFR BLD AUTO: 1.3 % — SIGNIFICANT CHANGE UP (ref 0–6)
HCT VFR BLD CALC: 30.2 % — LOW (ref 39–50)
HGB BLD-MCNC: 10.2 G/DL — LOW (ref 13–17)
IMM GRANULOCYTES NFR BLD AUTO: 1 % — HIGH (ref 0–0.9)
LYMPHOCYTES # BLD AUTO: 0.47 K/UL — LOW (ref 1–3.3)
LYMPHOCYTES # BLD AUTO: 15 % — SIGNIFICANT CHANGE UP (ref 13–44)
MCHC RBC-ENTMCNC: 33.8 G/DL — SIGNIFICANT CHANGE UP (ref 32–36)
MCHC RBC-ENTMCNC: 34.5 PG — HIGH (ref 27–34)
MCV RBC AUTO: 102 FL — HIGH (ref 80–100)
MONOCYTES # BLD AUTO: 0.49 K/UL — SIGNIFICANT CHANGE UP (ref 0–0.9)
MONOCYTES NFR BLD AUTO: 15.6 % — HIGH (ref 2–14)
NEUTROPHILS # BLD AUTO: 2.1 K/UL — SIGNIFICANT CHANGE UP (ref 1.8–7.4)
NEUTROPHILS NFR BLD AUTO: 66.8 % — SIGNIFICANT CHANGE UP (ref 43–77)
NRBC # BLD: 0 /100 WBCS — SIGNIFICANT CHANGE UP (ref 0–0)
NRBC BLD-RTO: 0 /100 WBCS — SIGNIFICANT CHANGE UP (ref 0–0)
PLATELET # BLD AUTO: 125 K/UL — LOW (ref 150–400)
RBC # BLD: 2.96 M/UL — LOW (ref 4.2–5.8)
RBC # FLD: 13 % — SIGNIFICANT CHANGE UP (ref 10.3–14.5)
WBC # BLD: 3.14 K/UL — LOW (ref 3.8–10.5)
WBC # FLD AUTO: 3.14 K/UL — LOW (ref 3.8–10.5)

## 2025-01-21 PROCEDURE — 77427 RADIATION TX MANAGEMENT X5: CPT

## 2025-01-21 PROCEDURE — 77014: CPT | Mod: 26

## 2025-01-21 PROCEDURE — 99214 OFFICE O/P EST MOD 30 MIN: CPT

## 2025-01-21 RX ORDER — NYSTATIN 100000 [USP'U]/ML
100000 SUSPENSION ORAL 4 TIMES DAILY
Qty: 200 | Refills: 0 | Status: ACTIVE | COMMUNITY
Start: 2025-01-21 | End: 1900-01-01

## 2025-01-22 ENCOUNTER — APPOINTMENT (OUTPATIENT)
Dept: OTOLARYNGOLOGY | Facility: CLINIC | Age: 75
End: 2025-01-22
Payer: MEDICARE

## 2025-01-22 PROCEDURE — G6002: CPT | Mod: 26

## 2025-01-22 PROCEDURE — 99214 OFFICE O/P EST MOD 30 MIN: CPT

## 2025-01-23 PROCEDURE — G6002: CPT | Mod: 26

## 2025-01-23 RX ORDER — SODIUM FLUORIDE 1.1 G/100G
1.1 GEL, DENTIFRICE ORAL
Qty: 2 | Refills: 11 | Status: ACTIVE | COMMUNITY
Start: 2025-01-23 | End: 1900-01-01

## 2025-01-24 ENCOUNTER — APPOINTMENT (OUTPATIENT)
Dept: INFUSION THERAPY | Facility: HOSPITAL | Age: 75
End: 2025-01-24

## 2025-01-24 PROCEDURE — G6002: CPT | Mod: 26

## 2025-01-27 PROCEDURE — G6002: CPT | Mod: 26

## 2025-01-28 ENCOUNTER — APPOINTMENT (OUTPATIENT)
Dept: HEMATOLOGY ONCOLOGY | Facility: CLINIC | Age: 75
End: 2025-01-28
Payer: MEDICARE

## 2025-01-28 ENCOUNTER — APPOINTMENT (OUTPATIENT)
Dept: INFUSION THERAPY | Facility: HOSPITAL | Age: 75
End: 2025-01-28

## 2025-01-28 ENCOUNTER — RESULT REVIEW (OUTPATIENT)
Age: 75
End: 2025-01-28

## 2025-01-28 VITALS
TEMPERATURE: 97.5 F | OXYGEN SATURATION: 96 % | SYSTOLIC BLOOD PRESSURE: 107 MMHG | HEIGHT: 66.86 IN | RESPIRATION RATE: 18 BRPM | WEIGHT: 158.73 LBS | DIASTOLIC BLOOD PRESSURE: 71 MMHG | HEART RATE: 81 BPM | BODY MASS INDEX: 24.91 KG/M2

## 2025-01-28 LAB
BASOPHILS # BLD AUTO: 0.01 K/UL — SIGNIFICANT CHANGE UP (ref 0–0.2)
BASOPHILS NFR BLD AUTO: 0.2 % — SIGNIFICANT CHANGE UP (ref 0–2)
EOSINOPHIL # BLD AUTO: 0.06 K/UL — SIGNIFICANT CHANGE UP (ref 0–0.5)
EOSINOPHIL NFR BLD AUTO: 1.4 % — SIGNIFICANT CHANGE UP (ref 0–6)
HCT VFR BLD CALC: 31.3 % — LOW (ref 39–50)
HGB BLD-MCNC: 10.4 G/DL — LOW (ref 13–17)
IMM GRANULOCYTES NFR BLD AUTO: 1 % — HIGH (ref 0–0.9)
LYMPHOCYTES # BLD AUTO: 0.58 K/UL — LOW (ref 1–3.3)
LYMPHOCYTES # BLD AUTO: 14 % — SIGNIFICANT CHANGE UP (ref 13–44)
MCHC RBC-ENTMCNC: 33.2 G/DL — SIGNIFICANT CHANGE UP (ref 32–36)
MCHC RBC-ENTMCNC: 34 PG — SIGNIFICANT CHANGE UP (ref 27–34)
MCV RBC AUTO: 102.3 FL — HIGH (ref 80–100)
MONOCYTES # BLD AUTO: 0.94 K/UL — HIGH (ref 0–0.9)
MONOCYTES NFR BLD AUTO: 22.7 % — HIGH (ref 2–14)
NEUTROPHILS # BLD AUTO: 2.52 K/UL — SIGNIFICANT CHANGE UP (ref 1.8–7.4)
NEUTROPHILS NFR BLD AUTO: 60.7 % — SIGNIFICANT CHANGE UP (ref 43–77)
NRBC # BLD: 0 /100 WBCS — SIGNIFICANT CHANGE UP (ref 0–0)
NRBC BLD-RTO: 0 /100 WBCS — SIGNIFICANT CHANGE UP (ref 0–0)
PLATELET # BLD AUTO: 248 K/UL — SIGNIFICANT CHANGE UP (ref 150–400)
RBC # BLD: 3.06 M/UL — LOW (ref 4.2–5.8)
RBC # FLD: 13.7 % — SIGNIFICANT CHANGE UP (ref 10.3–14.5)
WBC # BLD: 4.15 K/UL — SIGNIFICANT CHANGE UP (ref 3.8–10.5)
WBC # FLD AUTO: 4.15 K/UL — SIGNIFICANT CHANGE UP (ref 3.8–10.5)

## 2025-01-28 PROCEDURE — 99214 OFFICE O/P EST MOD 30 MIN: CPT

## 2025-01-28 PROCEDURE — G2211 COMPLEX E/M VISIT ADD ON: CPT

## 2025-01-28 PROCEDURE — 77427 RADIATION TX MANAGEMENT X5: CPT

## 2025-01-28 PROCEDURE — 77014: CPT | Mod: 26

## 2025-01-29 ENCOUNTER — NON-APPOINTMENT (OUTPATIENT)
Age: 75
End: 2025-01-29

## 2025-01-29 PROCEDURE — G6002: CPT | Mod: 26

## 2025-01-29 RX ORDER — SILVER SULFADIAZINE 10 MG/G
1 CREAM TOPICAL
Qty: 1 | Refills: 0 | Status: ACTIVE | COMMUNITY
Start: 2025-01-29 | End: 1900-01-01

## 2025-01-29 RX ORDER — SILVER SULFADIAZINE 10 MG/G
1 CREAM TOPICAL
Qty: 1 | Refills: 0 | Status: DISCONTINUED | COMMUNITY
Start: 2025-01-29 | End: 2025-01-29

## 2025-01-30 PROCEDURE — G6002: CPT | Mod: 26

## 2025-01-31 ENCOUNTER — RESULT REVIEW (OUTPATIENT)
Age: 75
End: 2025-01-31

## 2025-01-31 ENCOUNTER — APPOINTMENT (OUTPATIENT)
Dept: INFUSION THERAPY | Facility: HOSPITAL | Age: 75
End: 2025-01-31

## 2025-01-31 LAB
ALBUMIN SERPL ELPH-MCNC: 3.6 G/DL — SIGNIFICANT CHANGE UP (ref 3.3–5)
ALP SERPL-CCNC: 61 U/L — SIGNIFICANT CHANGE UP (ref 40–120)
ALT FLD-CCNC: 8 U/L — LOW (ref 10–45)
ANION GAP SERPL CALC-SCNC: 13 MMOL/L — SIGNIFICANT CHANGE UP (ref 5–17)
AST SERPL-CCNC: 18 U/L — SIGNIFICANT CHANGE UP (ref 10–40)
BILIRUB SERPL-MCNC: 0.2 MG/DL — SIGNIFICANT CHANGE UP (ref 0.2–1.2)
BUN SERPL-MCNC: 23 MG/DL — SIGNIFICANT CHANGE UP (ref 7–23)
CALCIUM SERPL-MCNC: 9.5 MG/DL — SIGNIFICANT CHANGE UP (ref 8.4–10.5)
CHLORIDE SERPL-SCNC: 100 MMOL/L — SIGNIFICANT CHANGE UP (ref 96–108)
CO2 SERPL-SCNC: 24 MMOL/L — SIGNIFICANT CHANGE UP (ref 22–31)
CREAT SERPL-MCNC: 1.08 MG/DL — SIGNIFICANT CHANGE UP (ref 0.5–1.3)
EGFR: 73 ML/MIN/1.73M2 — SIGNIFICANT CHANGE UP
EGFR: 73 ML/MIN/1.73M2 — SIGNIFICANT CHANGE UP
GLUCOSE SERPL-MCNC: 136 MG/DL — HIGH (ref 70–99)
MAGNESIUM SERPL-MCNC: 1.6 MG/DL — SIGNIFICANT CHANGE UP (ref 1.6–2.6)
POTASSIUM SERPL-MCNC: 4.2 MMOL/L — SIGNIFICANT CHANGE UP (ref 3.5–5.3)
POTASSIUM SERPL-SCNC: 4.2 MMOL/L — SIGNIFICANT CHANGE UP (ref 3.5–5.3)
PROT SERPL-MCNC: 6.4 G/DL — SIGNIFICANT CHANGE UP (ref 6–8.3)
SODIUM SERPL-SCNC: 137 MMOL/L — SIGNIFICANT CHANGE UP (ref 135–145)

## 2025-01-31 PROCEDURE — G6002: CPT | Mod: 26

## 2025-02-03 ENCOUNTER — NON-APPOINTMENT (OUTPATIENT)
Age: 75
End: 2025-02-03

## 2025-02-03 PROCEDURE — G6002: CPT | Mod: 26

## 2025-02-04 ENCOUNTER — NON-APPOINTMENT (OUTPATIENT)
Age: 75
End: 2025-02-04

## 2025-02-04 ENCOUNTER — RESULT REVIEW (OUTPATIENT)
Age: 75
End: 2025-02-04

## 2025-02-04 ENCOUNTER — APPOINTMENT (OUTPATIENT)
Dept: INFUSION THERAPY | Facility: HOSPITAL | Age: 75
End: 2025-02-04

## 2025-02-04 ENCOUNTER — APPOINTMENT (OUTPATIENT)
Dept: HEMATOLOGY ONCOLOGY | Facility: CLINIC | Age: 75
End: 2025-02-04
Payer: MEDICARE

## 2025-02-04 VITALS
DIASTOLIC BLOOD PRESSURE: 70 MMHG | RESPIRATION RATE: 18 BRPM | SYSTOLIC BLOOD PRESSURE: 115 MMHG | HEIGHT: 67 IN | HEART RATE: 101 BPM | TEMPERATURE: 97 F | BODY MASS INDEX: 25.17 KG/M2 | OXYGEN SATURATION: 97 % | WEIGHT: 160.36 LBS

## 2025-02-04 LAB
BASOPHILS # BLD AUTO: 0.02 K/UL — SIGNIFICANT CHANGE UP (ref 0–0.2)
BASOPHILS NFR BLD AUTO: 0.4 % — SIGNIFICANT CHANGE UP (ref 0–2)
EOSINOPHIL # BLD AUTO: 0.01 K/UL — SIGNIFICANT CHANGE UP (ref 0–0.5)
EOSINOPHIL NFR BLD AUTO: 0.2 % — SIGNIFICANT CHANGE UP (ref 0–6)
HCT VFR BLD CALC: 28.3 % — LOW (ref 39–50)
HGB BLD-MCNC: 9.4 G/DL — LOW (ref 13–17)
IMM GRANULOCYTES NFR BLD AUTO: 0.6 % — SIGNIFICANT CHANGE UP (ref 0–0.9)
LYMPHOCYTES # BLD AUTO: 0.48 K/UL — LOW (ref 1–3.3)
LYMPHOCYTES # BLD AUTO: 9.1 % — LOW (ref 13–44)
MCHC RBC-ENTMCNC: 33.2 G/DL — SIGNIFICANT CHANGE UP (ref 32–36)
MCHC RBC-ENTMCNC: 34.2 PG — HIGH (ref 27–34)
MCV RBC AUTO: 102.9 FL — HIGH (ref 80–100)
MONOCYTES # BLD AUTO: 1.16 K/UL — HIGH (ref 0–0.9)
MONOCYTES NFR BLD AUTO: 21.9 % — HIGH (ref 2–14)
NEUTROPHILS # BLD AUTO: 3.59 K/UL — SIGNIFICANT CHANGE UP (ref 1.8–7.4)
NEUTROPHILS NFR BLD AUTO: 67.8 % — SIGNIFICANT CHANGE UP (ref 43–77)
NRBC # BLD: 0 /100 WBCS — SIGNIFICANT CHANGE UP (ref 0–0)
NRBC BLD-RTO: 0 /100 WBCS — SIGNIFICANT CHANGE UP (ref 0–0)
PLATELET # BLD AUTO: 180 K/UL — SIGNIFICANT CHANGE UP (ref 150–400)
RBC # BLD: 2.75 M/UL — LOW (ref 4.2–5.8)
RBC # FLD: 14.2 % — SIGNIFICANT CHANGE UP (ref 10.3–14.5)
WBC # BLD: 5.29 K/UL — SIGNIFICANT CHANGE UP (ref 3.8–10.5)
WBC # FLD AUTO: 5.29 K/UL — SIGNIFICANT CHANGE UP (ref 3.8–10.5)

## 2025-02-04 PROCEDURE — 77014: CPT | Mod: 26

## 2025-02-04 PROCEDURE — G2211 COMPLEX E/M VISIT ADD ON: CPT

## 2025-02-04 PROCEDURE — 77427 RADIATION TX MANAGEMENT X5: CPT

## 2025-02-04 PROCEDURE — 99214 OFFICE O/P EST MOD 30 MIN: CPT

## 2025-02-05 ENCOUNTER — NON-APPOINTMENT (OUTPATIENT)
Age: 75
End: 2025-02-05

## 2025-02-05 VITALS
HEART RATE: 85 BPM | DIASTOLIC BLOOD PRESSURE: 77 MMHG | OXYGEN SATURATION: 99 % | SYSTOLIC BLOOD PRESSURE: 122 MMHG | RESPIRATION RATE: 18 BRPM

## 2025-02-05 VITALS — WEIGHT: 163.47 LBS | BODY MASS INDEX: 25.6 KG/M2

## 2025-02-05 PROCEDURE — G6002: CPT | Mod: 26

## 2025-02-06 PROCEDURE — G6002: CPT | Mod: 26

## 2025-02-07 ENCOUNTER — APPOINTMENT (OUTPATIENT)
Dept: CT IMAGING | Facility: IMAGING CENTER | Age: 75
End: 2025-02-07

## 2025-02-07 ENCOUNTER — RESULT REVIEW (OUTPATIENT)
Age: 75
End: 2025-02-07

## 2025-02-07 ENCOUNTER — APPOINTMENT (OUTPATIENT)
Dept: INFUSION THERAPY | Facility: HOSPITAL | Age: 75
End: 2025-02-07

## 2025-02-07 ENCOUNTER — OUTPATIENT (OUTPATIENT)
Dept: OUTPATIENT SERVICES | Facility: HOSPITAL | Age: 75
LOS: 1 days | End: 2025-02-07
Payer: COMMERCIAL

## 2025-02-07 DIAGNOSIS — C11.9 MALIGNANT NEOPLASM OF NASOPHARYNX, UNSPECIFIED: ICD-10-CM

## 2025-02-07 DIAGNOSIS — Z98.890 OTHER SPECIFIED POSTPROCEDURAL STATES: Chronic | ICD-10-CM

## 2025-02-07 DIAGNOSIS — Z98.49 CATARACT EXTRACTION STATUS, UNSPECIFIED EYE: Chronic | ICD-10-CM

## 2025-02-07 DIAGNOSIS — Z00.8 ENCOUNTER FOR OTHER GENERAL EXAMINATION: ICD-10-CM

## 2025-02-07 DIAGNOSIS — Z96.652 PRESENCE OF LEFT ARTIFICIAL KNEE JOINT: Chronic | ICD-10-CM

## 2025-02-07 DIAGNOSIS — Z96.651 PRESENCE OF RIGHT ARTIFICIAL KNEE JOINT: Chronic | ICD-10-CM

## 2025-02-07 LAB
ANION GAP SERPL CALC-SCNC: 12 MMOL/L — SIGNIFICANT CHANGE UP (ref 5–17)
BUN SERPL-MCNC: 26 MG/DL — HIGH (ref 7–23)
CALCIUM SERPL-MCNC: 9.8 MG/DL — SIGNIFICANT CHANGE UP (ref 8.4–10.5)
CHLORIDE SERPL-SCNC: 99 MMOL/L — SIGNIFICANT CHANGE UP (ref 96–108)
CO2 SERPL-SCNC: 26 MMOL/L — SIGNIFICANT CHANGE UP (ref 22–31)
CREAT SERPL-MCNC: 1.11 MG/DL — SIGNIFICANT CHANGE UP (ref 0.5–1.3)
EGFR: 71 ML/MIN/1.73M2 — SIGNIFICANT CHANGE UP
EGFR: 71 ML/MIN/1.73M2 — SIGNIFICANT CHANGE UP
GLUCOSE SERPL-MCNC: 112 MG/DL — HIGH (ref 70–99)
MAGNESIUM SERPL-MCNC: 1.7 MG/DL — SIGNIFICANT CHANGE UP (ref 1.6–2.6)
POTASSIUM SERPL-MCNC: 5 MMOL/L — SIGNIFICANT CHANGE UP (ref 3.5–5.3)
POTASSIUM SERPL-SCNC: 5 MMOL/L — SIGNIFICANT CHANGE UP (ref 3.5–5.3)
SODIUM SERPL-SCNC: 136 MMOL/L — SIGNIFICANT CHANGE UP (ref 135–145)

## 2025-02-07 PROCEDURE — G6002: CPT | Mod: 26

## 2025-02-07 PROCEDURE — 70487 CT MAXILLOFACIAL W/DYE: CPT | Mod: 26

## 2025-02-07 PROCEDURE — 70487 CT MAXILLOFACIAL W/DYE: CPT

## 2025-02-07 PROCEDURE — 70491 CT SOFT TISSUE NECK W/DYE: CPT | Mod: 26

## 2025-02-07 PROCEDURE — 70491 CT SOFT TISSUE NECK W/DYE: CPT

## 2025-02-10 PROCEDURE — G6002: CPT | Mod: 26

## 2025-02-11 ENCOUNTER — APPOINTMENT (OUTPATIENT)
Dept: HEMATOLOGY ONCOLOGY | Facility: CLINIC | Age: 75
End: 2025-02-11
Payer: MEDICARE

## 2025-02-11 ENCOUNTER — RESULT REVIEW (OUTPATIENT)
Age: 75
End: 2025-02-11

## 2025-02-11 ENCOUNTER — APPOINTMENT (OUTPATIENT)
Dept: INFUSION THERAPY | Facility: HOSPITAL | Age: 75
End: 2025-02-11

## 2025-02-11 DIAGNOSIS — T45.1X5A ANEMIA DUE TO ANTINEOPLASTIC CHEMOTHERAPY: ICD-10-CM

## 2025-02-11 DIAGNOSIS — B37.0 CANDIDAL STOMATITIS: ICD-10-CM

## 2025-02-11 DIAGNOSIS — D64.81 ANEMIA DUE TO ANTINEOPLASTIC CHEMOTHERAPY: ICD-10-CM

## 2025-02-11 DIAGNOSIS — K12.30 ORAL MUCOSITIS (ULCERATIVE), UNSPECIFIED: ICD-10-CM

## 2025-02-11 PROBLEM — K59.00 CONSTIPATION: Status: ACTIVE | Noted: 2025-02-11

## 2025-02-11 LAB
ANION GAP SERPL CALC-SCNC: 13 MMOL/L — SIGNIFICANT CHANGE UP (ref 5–17)
BUN SERPL-MCNC: 24 MG/DL — HIGH (ref 7–23)
CALCIUM SERPL-MCNC: 9.6 MG/DL — SIGNIFICANT CHANGE UP (ref 8.4–10.5)
CHLORIDE SERPL-SCNC: 96 MMOL/L — SIGNIFICANT CHANGE UP (ref 96–108)
CO2 SERPL-SCNC: 24 MMOL/L — SIGNIFICANT CHANGE UP (ref 22–31)
CREAT SERPL-MCNC: 1.11 MG/DL — SIGNIFICANT CHANGE UP (ref 0.5–1.3)
EGFR: 71 ML/MIN/1.73M2 — SIGNIFICANT CHANGE UP
EGFR: 71 ML/MIN/1.73M2 — SIGNIFICANT CHANGE UP
GLUCOSE SERPL-MCNC: 130 MG/DL — HIGH (ref 70–99)
MAGNESIUM SERPL-MCNC: 1.9 MG/DL — SIGNIFICANT CHANGE UP (ref 1.6–2.6)
POTASSIUM SERPL-MCNC: 4.6 MMOL/L — SIGNIFICANT CHANGE UP (ref 3.5–5.3)
POTASSIUM SERPL-SCNC: 4.6 MMOL/L — SIGNIFICANT CHANGE UP (ref 3.5–5.3)
SODIUM SERPL-SCNC: 134 MMOL/L — LOW (ref 135–145)

## 2025-02-11 PROCEDURE — 99214 OFFICE O/P EST MOD 30 MIN: CPT

## 2025-02-11 RX ORDER — DOCUSATE SODIUM 50 MG/5ML
100 LIQUID ORAL 3 TIMES DAILY
Qty: 400 | Refills: 0 | Status: ACTIVE | COMMUNITY
Start: 2025-02-11 | End: 1900-01-01

## 2025-02-11 RX ORDER — SENNOSIDES 8.8 MG/5ML
8.8 LIQUID ORAL 3 TIMES DAILY
Qty: 1 | Refills: 1 | Status: ACTIVE | COMMUNITY
Start: 2025-02-11 | End: 1900-01-01

## 2025-02-12 ENCOUNTER — NON-APPOINTMENT (OUTPATIENT)
Age: 75
End: 2025-02-12

## 2025-02-14 ENCOUNTER — RESULT REVIEW (OUTPATIENT)
Age: 75
End: 2025-02-14

## 2025-02-14 ENCOUNTER — APPOINTMENT (OUTPATIENT)
Dept: INFUSION THERAPY | Facility: HOSPITAL | Age: 75
End: 2025-02-14

## 2025-02-14 LAB
BASOPHILS # BLD AUTO: 0.02 K/UL — SIGNIFICANT CHANGE UP (ref 0–0.2)
BASOPHILS NFR BLD AUTO: 0.5 % — SIGNIFICANT CHANGE UP (ref 0–2)
EOSINOPHIL # BLD AUTO: 0.02 K/UL — SIGNIFICANT CHANGE UP (ref 0–0.5)
EOSINOPHIL NFR BLD AUTO: 0.5 % — SIGNIFICANT CHANGE UP (ref 0–6)
HCT VFR BLD CALC: 26.2 % — LOW (ref 39–50)
HGB BLD-MCNC: 8.7 G/DL — LOW (ref 13–17)
HYPOCHROMIA BLD QL: SLIGHT — SIGNIFICANT CHANGE UP
IMM GRANULOCYTES NFR BLD AUTO: 0.5 % — SIGNIFICANT CHANGE UP (ref 0–0.9)
LYMPHOCYTES # BLD AUTO: 0.4 K/UL — LOW (ref 1–3.3)
LYMPHOCYTES # BLD AUTO: 10.6 % — LOW (ref 13–44)
MACROCYTES BLD QL: SLIGHT — SIGNIFICANT CHANGE UP
MCHC RBC-ENTMCNC: 33.2 G/DL — SIGNIFICANT CHANGE UP (ref 32–36)
MCHC RBC-ENTMCNC: 34.3 PG — HIGH (ref 27–34)
MCV RBC AUTO: 103.1 FL — HIGH (ref 80–100)
MONOCYTES # BLD AUTO: 0.7 K/UL — SIGNIFICANT CHANGE UP (ref 0–0.9)
MONOCYTES NFR BLD AUTO: 18.5 % — HIGH (ref 2–14)
NEUTROPHILS # BLD AUTO: 2.63 K/UL — SIGNIFICANT CHANGE UP (ref 1.8–7.4)
NEUTROPHILS NFR BLD AUTO: 69.4 % — SIGNIFICANT CHANGE UP (ref 43–77)
NRBC BLD AUTO-RTO: 0 /100 WBCS — SIGNIFICANT CHANGE UP (ref 0–0)
PLAT MORPH BLD: NORMAL — SIGNIFICANT CHANGE UP
PLATELET # BLD AUTO: 65 K/UL — LOW (ref 150–400)
RBC # BLD: 2.54 M/UL — LOW (ref 4.2–5.8)
RBC # FLD: 14.6 % — HIGH (ref 10.3–14.5)
RBC BLD AUTO: ABNORMAL
WBC # BLD: 3.79 K/UL — LOW (ref 3.8–10.5)
WBC # FLD AUTO: 3.79 K/UL — LOW (ref 3.8–10.5)

## 2025-02-18 ENCOUNTER — APPOINTMENT (OUTPATIENT)
Dept: INFUSION THERAPY | Facility: HOSPITAL | Age: 75
End: 2025-02-18

## 2025-02-18 ENCOUNTER — RESULT REVIEW (OUTPATIENT)
Age: 75
End: 2025-02-18

## 2025-02-18 ENCOUNTER — APPOINTMENT (OUTPATIENT)
Dept: HEMATOLOGY ONCOLOGY | Facility: CLINIC | Age: 75
End: 2025-02-18
Payer: MEDICARE

## 2025-02-18 ENCOUNTER — NON-APPOINTMENT (OUTPATIENT)
Age: 75
End: 2025-02-18

## 2025-02-18 VITALS
BODY MASS INDEX: 25.37 KG/M2 | TEMPERATURE: 96 F | SYSTOLIC BLOOD PRESSURE: 107 MMHG | DIASTOLIC BLOOD PRESSURE: 71 MMHG | RESPIRATION RATE: 18 BRPM | OXYGEN SATURATION: 98 % | WEIGHT: 162 LBS | HEART RATE: 113 BPM

## 2025-02-18 DIAGNOSIS — G89.3 NEOPLASM RELATED PAIN (ACUTE) (CHRONIC): ICD-10-CM

## 2025-02-18 DIAGNOSIS — R09.81 NASAL CONGESTION: ICD-10-CM

## 2025-02-18 LAB
ANION GAP SERPL CALC-SCNC: 17 MMOL/L — SIGNIFICANT CHANGE UP (ref 5–17)
BASOPHILS # BLD AUTO: 0.01 K/UL — SIGNIFICANT CHANGE UP (ref 0–0.2)
BASOPHILS NFR BLD AUTO: 0.3 % — SIGNIFICANT CHANGE UP (ref 0–2)
BUN SERPL-MCNC: 25 MG/DL — HIGH (ref 7–23)
CALCIUM SERPL-MCNC: 9.9 MG/DL — SIGNIFICANT CHANGE UP (ref 8.4–10.5)
CHLORIDE SERPL-SCNC: 96 MMOL/L — SIGNIFICANT CHANGE UP (ref 96–108)
CO2 SERPL-SCNC: 24 MMOL/L — SIGNIFICANT CHANGE UP (ref 22–31)
CREAT SERPL-MCNC: 1.12 MG/DL — SIGNIFICANT CHANGE UP (ref 0.5–1.3)
EGFR: 70 ML/MIN/1.73M2 — SIGNIFICANT CHANGE UP
EGFR: 70 ML/MIN/1.73M2 — SIGNIFICANT CHANGE UP
EOSINOPHIL # BLD AUTO: 0.02 K/UL — SIGNIFICANT CHANGE UP (ref 0–0.5)
EOSINOPHIL NFR BLD AUTO: 0.7 % — SIGNIFICANT CHANGE UP (ref 0–6)
GLUCOSE SERPL-MCNC: 105 MG/DL — HIGH (ref 70–99)
HCT VFR BLD CALC: 29 % — LOW (ref 39–50)
HGB BLD-MCNC: 9.4 G/DL — LOW (ref 13–17)
IMM GRANULOCYTES NFR BLD AUTO: 0.3 % — SIGNIFICANT CHANGE UP (ref 0–0.9)
LYMPHOCYTES # BLD AUTO: 0.57 K/UL — LOW (ref 1–3.3)
LYMPHOCYTES # BLD AUTO: 18.8 % — SIGNIFICANT CHANGE UP (ref 13–44)
MCHC RBC-ENTMCNC: 32.4 G/DL — SIGNIFICANT CHANGE UP (ref 32–36)
MCHC RBC-ENTMCNC: 33.7 PG — SIGNIFICANT CHANGE UP (ref 27–34)
MCV RBC AUTO: 103.9 FL — HIGH (ref 80–100)
MONOCYTES # BLD AUTO: 0.5 K/UL — SIGNIFICANT CHANGE UP (ref 0–0.9)
MONOCYTES NFR BLD AUTO: 16.5 % — HIGH (ref 2–14)
NEUTROPHILS # BLD AUTO: 1.92 K/UL — SIGNIFICANT CHANGE UP (ref 1.8–7.4)
NEUTROPHILS NFR BLD AUTO: 63.4 % — SIGNIFICANT CHANGE UP (ref 43–77)
NRBC BLD AUTO-RTO: 0 /100 WBCS — SIGNIFICANT CHANGE UP (ref 0–0)
PLATELET # BLD AUTO: 33 K/UL — LOW (ref 150–400)
POTASSIUM SERPL-MCNC: 4.3 MMOL/L — SIGNIFICANT CHANGE UP (ref 3.5–5.3)
POTASSIUM SERPL-SCNC: 4.3 MMOL/L — SIGNIFICANT CHANGE UP (ref 3.5–5.3)
RBC # BLD: 2.79 M/UL — LOW (ref 4.2–5.8)
RBC # FLD: 14.6 % — HIGH (ref 10.3–14.5)
SODIUM SERPL-SCNC: 137 MMOL/L — SIGNIFICANT CHANGE UP (ref 135–145)
WBC # BLD: 3.03 K/UL — LOW (ref 3.8–10.5)
WBC # FLD AUTO: 3.03 K/UL — LOW (ref 3.8–10.5)

## 2025-02-18 PROCEDURE — 99214 OFFICE O/P EST MOD 30 MIN: CPT

## 2025-02-19 PROCEDURE — 77338 DESIGN MLC DEVICE FOR IMRT: CPT | Mod: 26

## 2025-02-19 PROCEDURE — 77300 RADIATION THERAPY DOSE PLAN: CPT | Mod: 26

## 2025-02-19 PROCEDURE — 77301 RADIOTHERAPY DOSE PLAN IMRT: CPT | Mod: 26

## 2025-02-20 RX ORDER — DIPHENHYDRAMINE HYDROCHLORIDE AND LIDOCAINE HYDROCHLORIDE AND ALUMINUM HYDROXIDE AND MAGNESIUM HYDRO
KIT
Qty: 300 | Refills: 4 | Status: ACTIVE | COMMUNITY
Start: 2025-02-20 | End: 1900-01-01

## 2025-02-20 RX ORDER — OXYCODONE 5 MG/1
5 TABLET ORAL
Qty: 65 | Refills: 0 | Status: ACTIVE | COMMUNITY
Start: 2025-02-20 | End: 1900-01-01

## 2025-02-21 ENCOUNTER — RESULT REVIEW (OUTPATIENT)
Age: 75
End: 2025-02-21

## 2025-02-21 ENCOUNTER — APPOINTMENT (OUTPATIENT)
Dept: INFUSION THERAPY | Facility: HOSPITAL | Age: 75
End: 2025-02-21

## 2025-02-21 ENCOUNTER — APPOINTMENT (OUTPATIENT)
Dept: HEMATOLOGY ONCOLOGY | Facility: CLINIC | Age: 75
End: 2025-02-21

## 2025-02-21 DIAGNOSIS — L58.9 RADIODERMATITIS, UNSPECIFIED: ICD-10-CM

## 2025-02-21 DIAGNOSIS — K59.00 CONSTIPATION, UNSPECIFIED: ICD-10-CM

## 2025-02-21 DIAGNOSIS — R20.2 PARESTHESIA OF SKIN: ICD-10-CM

## 2025-02-21 DIAGNOSIS — R07.0 PAIN IN THROAT: ICD-10-CM

## 2025-02-21 DIAGNOSIS — C11.9 MALIGNANT NEOPLASM OF NASOPHARYNX, UNSPECIFIED: ICD-10-CM

## 2025-02-21 DIAGNOSIS — K11.7 DISTURBANCES OF SALIVARY SECRETION: ICD-10-CM

## 2025-02-21 DIAGNOSIS — C77.0 SECONDARY AND UNSPECIFIED MALIGNANT NEOPLASM OF LYMPH NODES OF HEAD, FACE AND NECK: ICD-10-CM

## 2025-02-21 DIAGNOSIS — R52 PAIN, UNSPECIFIED: ICD-10-CM

## 2025-02-21 LAB
BASOPHILS # BLD AUTO: 0.01 K/UL — SIGNIFICANT CHANGE UP (ref 0–0.2)
BASOPHILS NFR BLD AUTO: 0.4 % — SIGNIFICANT CHANGE UP (ref 0–2)
EOSINOPHIL # BLD AUTO: 0.06 K/UL — SIGNIFICANT CHANGE UP (ref 0–0.5)
EOSINOPHIL NFR BLD AUTO: 2.4 % — SIGNIFICANT CHANGE UP (ref 0–6)
HCT VFR BLD CALC: 26.4 % — LOW (ref 39–50)
HGB BLD-MCNC: 8.9 G/DL — LOW (ref 13–17)
IMM GRANULOCYTES NFR BLD AUTO: 0.4 % — SIGNIFICANT CHANGE UP (ref 0–0.9)
LYMPHOCYTES # BLD AUTO: 0.73 K/UL — LOW (ref 1–3.3)
LYMPHOCYTES # BLD AUTO: 29.2 % — SIGNIFICANT CHANGE UP (ref 13–44)
MCHC RBC-ENTMCNC: 33.7 G/DL — SIGNIFICANT CHANGE UP (ref 32–36)
MCHC RBC-ENTMCNC: 34.6 PG — HIGH (ref 27–34)
MCV RBC AUTO: 102.7 FL — HIGH (ref 80–100)
MONOCYTES # BLD AUTO: 0.38 K/UL — SIGNIFICANT CHANGE UP (ref 0–0.9)
MONOCYTES NFR BLD AUTO: 15.2 % — HIGH (ref 2–14)
NEUTROPHILS # BLD AUTO: 1.31 K/UL — LOW (ref 1.8–7.4)
NEUTROPHILS NFR BLD AUTO: 52.4 % — SIGNIFICANT CHANGE UP (ref 43–77)
NRBC BLD AUTO-RTO: 0 /100 WBCS — SIGNIFICANT CHANGE UP (ref 0–0)
PLATELET # BLD AUTO: 24 K/UL — LOW (ref 150–400)
RBC # BLD: 2.57 M/UL — LOW (ref 4.2–5.8)
RBC # FLD: 14.9 % — HIGH (ref 10.3–14.5)
WBC # BLD: 2.5 K/UL — LOW (ref 3.8–10.5)
WBC # FLD AUTO: 2.5 K/UL — LOW (ref 3.8–10.5)

## 2025-02-21 PROCEDURE — G6002: CPT | Mod: 26

## 2025-02-21 PROCEDURE — 99214 OFFICE O/P EST MOD 30 MIN: CPT

## 2025-02-21 PROCEDURE — 77427 RADIATION TX MANAGEMENT X5: CPT

## 2025-02-24 ENCOUNTER — APPOINTMENT (OUTPATIENT)
Dept: HEMATOLOGY ONCOLOGY | Facility: CLINIC | Age: 75
End: 2025-02-24

## 2025-02-24 ENCOUNTER — NON-APPOINTMENT (OUTPATIENT)
Age: 75
End: 2025-02-24

## 2025-02-24 ENCOUNTER — APPOINTMENT (OUTPATIENT)
Dept: INFUSION THERAPY | Facility: HOSPITAL | Age: 75
End: 2025-02-24

## 2025-02-24 ENCOUNTER — RESULT REVIEW (OUTPATIENT)
Age: 75
End: 2025-02-24

## 2025-02-24 VITALS
SYSTOLIC BLOOD PRESSURE: 129 MMHG | HEIGHT: 66 IN | BODY MASS INDEX: 25.44 KG/M2 | TEMPERATURE: 97.9 F | RESPIRATION RATE: 18 BRPM | WEIGHT: 158.29 LBS | DIASTOLIC BLOOD PRESSURE: 84 MMHG | HEART RATE: 47 BPM | OXYGEN SATURATION: 97 %

## 2025-02-24 LAB
ANION GAP SERPL CALC-SCNC: 11 MMOL/L — SIGNIFICANT CHANGE UP (ref 5–17)
BASOPHILS # BLD AUTO: 0 K/UL — SIGNIFICANT CHANGE UP (ref 0–0.2)
BASOPHILS NFR BLD AUTO: 0 % — SIGNIFICANT CHANGE UP (ref 0–2)
BUN SERPL-MCNC: 21 MG/DL — SIGNIFICANT CHANGE UP (ref 7–23)
CALCIUM SERPL-MCNC: 10.1 MG/DL — SIGNIFICANT CHANGE UP (ref 8.4–10.5)
CHLORIDE SERPL-SCNC: 96 MMOL/L — SIGNIFICANT CHANGE UP (ref 96–108)
CO2 SERPL-SCNC: 28 MMOL/L — SIGNIFICANT CHANGE UP (ref 22–31)
CREAT SERPL-MCNC: 1.14 MG/DL — SIGNIFICANT CHANGE UP (ref 0.5–1.3)
EGFR: 68 ML/MIN/1.73M2 — SIGNIFICANT CHANGE UP
EGFR: 68 ML/MIN/1.73M2 — SIGNIFICANT CHANGE UP
EOSINOPHIL # BLD AUTO: 0.08 K/UL — SIGNIFICANT CHANGE UP (ref 0–0.5)
EOSINOPHIL NFR BLD AUTO: 3.3 % — SIGNIFICANT CHANGE UP (ref 0–6)
GLUCOSE SERPL-MCNC: 107 MG/DL — HIGH (ref 70–99)
HCT VFR BLD CALC: 29.1 % — LOW (ref 39–50)
HGB BLD-MCNC: 9.6 G/DL — LOW (ref 13–17)
IMM GRANULOCYTES NFR BLD AUTO: 0.4 % — SIGNIFICANT CHANGE UP (ref 0–0.9)
LYMPHOCYTES # BLD AUTO: 0.92 K/UL — LOW (ref 1–3.3)
LYMPHOCYTES # BLD AUTO: 37.4 % — SIGNIFICANT CHANGE UP (ref 13–44)
MCHC RBC-ENTMCNC: 33 G/DL — SIGNIFICANT CHANGE UP (ref 32–36)
MCHC RBC-ENTMCNC: 33.9 PG — SIGNIFICANT CHANGE UP (ref 27–34)
MCV RBC AUTO: 102.8 FL — HIGH (ref 80–100)
MONOCYTES # BLD AUTO: 0.31 K/UL — SIGNIFICANT CHANGE UP (ref 0–0.9)
MONOCYTES NFR BLD AUTO: 12.6 % — SIGNIFICANT CHANGE UP (ref 2–14)
NEUTROPHILS # BLD AUTO: 1.14 K/UL — LOW (ref 1.8–7.4)
NEUTROPHILS NFR BLD AUTO: 46.3 % — SIGNIFICANT CHANGE UP (ref 43–77)
NRBC BLD AUTO-RTO: 0 /100 WBCS — SIGNIFICANT CHANGE UP (ref 0–0)
PLATELET # BLD AUTO: 24 K/UL — LOW (ref 150–400)
POTASSIUM SERPL-MCNC: 4.8 MMOL/L — SIGNIFICANT CHANGE UP (ref 3.5–5.3)
POTASSIUM SERPL-SCNC: 4.8 MMOL/L — SIGNIFICANT CHANGE UP (ref 3.5–5.3)
RBC # BLD: 2.83 M/UL — LOW (ref 4.2–5.8)
RBC # FLD: 14.7 % — HIGH (ref 10.3–14.5)
SODIUM SERPL-SCNC: 134 MMOL/L — LOW (ref 135–145)
WBC # BLD: 2.46 K/UL — LOW (ref 3.8–10.5)
WBC # FLD AUTO: 2.46 K/UL — LOW (ref 3.8–10.5)

## 2025-02-24 PROCEDURE — G6002: CPT | Mod: 26

## 2025-02-24 RX ORDER — SILVER SULFADIAZINE 10 MG/G
1 CREAM TOPICAL TWICE DAILY
Qty: 1 | Refills: 0 | Status: ACTIVE | COMMUNITY
Start: 2025-02-20 | End: 1900-01-01

## 2025-02-25 PROCEDURE — G6002: CPT | Mod: 26

## 2025-02-26 ENCOUNTER — NON-APPOINTMENT (OUTPATIENT)
Age: 75
End: 2025-02-26

## 2025-02-26 PROCEDURE — G6002: CPT | Mod: 26

## 2025-02-27 PROCEDURE — 77014: CPT | Mod: 26

## 2025-02-28 ENCOUNTER — APPOINTMENT (OUTPATIENT)
Dept: INFUSION THERAPY | Facility: HOSPITAL | Age: 75
End: 2025-02-28

## 2025-02-28 ENCOUNTER — NON-APPOINTMENT (OUTPATIENT)
Age: 75
End: 2025-02-28

## 2025-03-04 ENCOUNTER — RESULT REVIEW (OUTPATIENT)
Age: 75
End: 2025-03-04

## 2025-03-04 ENCOUNTER — APPOINTMENT (OUTPATIENT)
Dept: HEMATOLOGY ONCOLOGY | Facility: CLINIC | Age: 75
End: 2025-03-04
Payer: MEDICARE

## 2025-03-04 ENCOUNTER — NON-APPOINTMENT (OUTPATIENT)
Age: 75
End: 2025-03-04

## 2025-03-04 ENCOUNTER — APPOINTMENT (OUTPATIENT)
Dept: OTOLARYNGOLOGY | Facility: CLINIC | Age: 75
End: 2025-03-04
Payer: MEDICARE

## 2025-03-04 ENCOUNTER — APPOINTMENT (OUTPATIENT)
Dept: INFUSION THERAPY | Facility: HOSPITAL | Age: 75
End: 2025-03-04

## 2025-03-04 VITALS — HEIGHT: 66 IN | BODY MASS INDEX: 25.39 KG/M2 | WEIGHT: 158 LBS

## 2025-03-04 DIAGNOSIS — R20.2 PARESTHESIA OF SKIN: ICD-10-CM

## 2025-03-04 DIAGNOSIS — H93.13 TINNITUS, BILATERAL: ICD-10-CM

## 2025-03-04 DIAGNOSIS — R09.81 NASAL CONGESTION: ICD-10-CM

## 2025-03-04 DIAGNOSIS — K59.00 CONSTIPATION, UNSPECIFIED: ICD-10-CM

## 2025-03-04 DIAGNOSIS — H69.91 UNSPECIFIED EUSTACHIAN TUBE DISORDER, RIGHT EAR: ICD-10-CM

## 2025-03-04 DIAGNOSIS — H69.93 UNSPECIFIED EUSTACHIAN TUBE DISORDER, BILATERAL: ICD-10-CM

## 2025-03-04 DIAGNOSIS — H90.3 SENSORINEURAL HEARING LOSS, BILATERAL: ICD-10-CM

## 2025-03-04 LAB
BASOPHILS # BLD AUTO: 0.01 K/UL — SIGNIFICANT CHANGE UP (ref 0–0.2)
BASOPHILS NFR BLD AUTO: 0.4 % — SIGNIFICANT CHANGE UP (ref 0–2)
EOSINOPHIL # BLD AUTO: 0.04 K/UL — SIGNIFICANT CHANGE UP (ref 0–0.5)
EOSINOPHIL NFR BLD AUTO: 1.4 % — SIGNIFICANT CHANGE UP (ref 0–6)
HCT VFR BLD CALC: 27.8 % — LOW (ref 39–50)
HGB BLD-MCNC: 9 G/DL — LOW (ref 13–17)
IMM GRANULOCYTES NFR BLD AUTO: 0.7 % — SIGNIFICANT CHANGE UP (ref 0–0.9)
LYMPHOCYTES # BLD AUTO: 0.79 K/UL — LOW (ref 1–3.3)
LYMPHOCYTES # BLD AUTO: 28.6 % — SIGNIFICANT CHANGE UP (ref 13–44)
MCHC RBC-ENTMCNC: 32.4 G/DL — SIGNIFICANT CHANGE UP (ref 32–36)
MCHC RBC-ENTMCNC: 33.5 PG — SIGNIFICANT CHANGE UP (ref 27–34)
MCV RBC AUTO: 103.3 FL — HIGH (ref 80–100)
MONOCYTES # BLD AUTO: 0.74 K/UL — SIGNIFICANT CHANGE UP (ref 0–0.9)
MONOCYTES NFR BLD AUTO: 26.8 % — HIGH (ref 2–14)
NEUTROPHILS # BLD AUTO: 1.16 K/UL — LOW (ref 1.8–7.4)
NEUTROPHILS NFR BLD AUTO: 42.1 % — LOW (ref 43–77)
NRBC BLD AUTO-RTO: 0 /100 WBCS — SIGNIFICANT CHANGE UP (ref 0–0)
PLATELET # BLD AUTO: 104 K/UL — LOW (ref 150–400)
RBC # BLD: 2.69 M/UL — LOW (ref 4.2–5.8)
RBC # FLD: 15.3 % — HIGH (ref 10.3–14.5)
WBC # BLD: 2.76 K/UL — LOW (ref 3.8–10.5)
WBC # FLD AUTO: 2.76 K/UL — LOW (ref 3.8–10.5)

## 2025-03-04 PROCEDURE — 69420 INCISION OF EARDRUM: CPT | Mod: LT

## 2025-03-04 PROCEDURE — 99214 OFFICE O/P EST MOD 30 MIN: CPT

## 2025-03-04 PROCEDURE — 92557 COMPREHENSIVE HEARING TEST: CPT

## 2025-03-04 PROCEDURE — 99213 OFFICE O/P EST LOW 20 MIN: CPT | Mod: 25

## 2025-03-04 PROCEDURE — 92567 TYMPANOMETRY: CPT

## 2025-03-04 PROCEDURE — 92504 EAR MICROSCOPY EXAMINATION: CPT

## 2025-03-07 ENCOUNTER — RESULT REVIEW (OUTPATIENT)
Age: 75
End: 2025-03-07

## 2025-03-07 ENCOUNTER — APPOINTMENT (OUTPATIENT)
Dept: INFUSION THERAPY | Facility: HOSPITAL | Age: 75
End: 2025-03-07

## 2025-03-07 ENCOUNTER — APPOINTMENT (OUTPATIENT)
Dept: HEMATOLOGY ONCOLOGY | Facility: CLINIC | Age: 75
End: 2025-03-07
Payer: MEDICARE

## 2025-03-07 DIAGNOSIS — C77.0 SECONDARY AND UNSPECIFIED MALIGNANT NEOPLASM OF LYMPH NODES OF HEAD, FACE AND NECK: ICD-10-CM

## 2025-03-07 DIAGNOSIS — C11.9 MALIGNANT NEOPLASM OF NASOPHARYNX, UNSPECIFIED: ICD-10-CM

## 2025-03-07 DIAGNOSIS — L58.9 RADIODERMATITIS, UNSPECIFIED: ICD-10-CM

## 2025-03-07 DIAGNOSIS — K11.7 DISTURBANCES OF SALIVARY SECRETION: ICD-10-CM

## 2025-03-07 DIAGNOSIS — T45.1X5A ANEMIA DUE TO ANTINEOPLASTIC CHEMOTHERAPY: ICD-10-CM

## 2025-03-07 DIAGNOSIS — R13.10 DYSPHAGIA, UNSPECIFIED: ICD-10-CM

## 2025-03-07 DIAGNOSIS — D64.81 ANEMIA DUE TO ANTINEOPLASTIC CHEMOTHERAPY: ICD-10-CM

## 2025-03-07 LAB
BASOPHILS # BLD AUTO: 0.02 K/UL — SIGNIFICANT CHANGE UP (ref 0–0.2)
BASOPHILS NFR BLD AUTO: 0.5 % — SIGNIFICANT CHANGE UP (ref 0–2)
EOSINOPHIL # BLD AUTO: 0.03 K/UL — SIGNIFICANT CHANGE UP (ref 0–0.5)
EOSINOPHIL NFR BLD AUTO: 0.8 % — SIGNIFICANT CHANGE UP (ref 0–6)
HCT VFR BLD CALC: 26.6 % — LOW (ref 39–50)
HGB BLD-MCNC: 8.8 G/DL — LOW (ref 13–17)
IMM GRANULOCYTES NFR BLD AUTO: 1.1 % — HIGH (ref 0–0.9)
LYMPHOCYTES # BLD AUTO: 1.09 K/UL — SIGNIFICANT CHANGE UP (ref 1–3.3)
LYMPHOCYTES # BLD AUTO: 28.8 % — SIGNIFICANT CHANGE UP (ref 13–44)
MCHC RBC-ENTMCNC: 33.1 G/DL — SIGNIFICANT CHANGE UP (ref 32–36)
MCHC RBC-ENTMCNC: 34.5 PG — HIGH (ref 27–34)
MCV RBC AUTO: 104.3 FL — HIGH (ref 80–100)
MONOCYTES # BLD AUTO: 0.92 K/UL — HIGH (ref 0–0.9)
MONOCYTES NFR BLD AUTO: 24.3 % — HIGH (ref 2–14)
NEUTROPHILS # BLD AUTO: 1.68 K/UL — LOW (ref 1.8–7.4)
NEUTROPHILS NFR BLD AUTO: 44.5 % — SIGNIFICANT CHANGE UP (ref 43–77)
NRBC BLD AUTO-RTO: 0 /100 WBCS — SIGNIFICANT CHANGE UP (ref 0–0)
PLATELET # BLD AUTO: 146 K/UL — LOW (ref 150–400)
RBC # BLD: 2.55 M/UL — LOW (ref 4.2–5.8)
RBC # FLD: 15.5 % — HIGH (ref 10.3–14.5)
WBC # BLD: 3.78 K/UL — LOW (ref 3.8–10.5)
WBC # FLD AUTO: 3.78 K/UL — LOW (ref 3.8–10.5)

## 2025-03-07 PROCEDURE — 99214 OFFICE O/P EST MOD 30 MIN: CPT

## 2025-03-11 ENCOUNTER — APPOINTMENT (OUTPATIENT)
Dept: INFUSION THERAPY | Facility: HOSPITAL | Age: 75
End: 2025-03-11

## 2025-03-11 ENCOUNTER — APPOINTMENT (OUTPATIENT)
Dept: HEMATOLOGY ONCOLOGY | Facility: CLINIC | Age: 75
End: 2025-03-11

## 2025-03-14 ENCOUNTER — APPOINTMENT (OUTPATIENT)
Dept: INFUSION THERAPY | Facility: HOSPITAL | Age: 75
End: 2025-03-14

## 2025-03-19 ENCOUNTER — NON-APPOINTMENT (OUTPATIENT)
Age: 75
End: 2025-03-19

## 2025-03-19 ENCOUNTER — APPOINTMENT (OUTPATIENT)
Dept: OTOLARYNGOLOGY | Facility: CLINIC | Age: 75
End: 2025-03-19
Payer: MEDICARE

## 2025-03-19 VITALS
WEIGHT: 158.73 LBS | BODY MASS INDEX: 25.51 KG/M2 | HEIGHT: 66 IN | DIASTOLIC BLOOD PRESSURE: 68 MMHG | HEART RATE: 102 BPM | SYSTOLIC BLOOD PRESSURE: 110 MMHG | OXYGEN SATURATION: 94 %

## 2025-03-19 DIAGNOSIS — R09.81 NASAL CONGESTION: ICD-10-CM

## 2025-03-19 DIAGNOSIS — C11.9 MALIGNANT NEOPLASM OF NASOPHARYNX, UNSPECIFIED: ICD-10-CM

## 2025-03-19 PROCEDURE — 99214 OFFICE O/P EST MOD 30 MIN: CPT | Mod: 25

## 2025-03-19 PROCEDURE — 31231 NASAL ENDOSCOPY DX: CPT

## 2025-04-02 ENCOUNTER — APPOINTMENT (OUTPATIENT)
Dept: OTOLARYNGOLOGY | Facility: CLINIC | Age: 75
End: 2025-04-02
Payer: MEDICARE

## 2025-04-02 ENCOUNTER — APPOINTMENT (OUTPATIENT)
Dept: RADIATION ONCOLOGY | Facility: CLINIC | Age: 75
End: 2025-04-02
Payer: MEDICARE

## 2025-04-02 VITALS
RESPIRATION RATE: 18 BRPM | BODY MASS INDEX: 25.51 KG/M2 | DIASTOLIC BLOOD PRESSURE: 79 MMHG | SYSTOLIC BLOOD PRESSURE: 120 MMHG | WEIGHT: 158.73 LBS | TEMPERATURE: 97.8 F | HEART RATE: 105 BPM | HEIGHT: 66 IN | OXYGEN SATURATION: 98 %

## 2025-04-02 DIAGNOSIS — C11.9 MALIGNANT NEOPLASM OF NASOPHARYNX, UNSPECIFIED: ICD-10-CM

## 2025-04-02 PROCEDURE — 99214 OFFICE O/P EST MOD 30 MIN: CPT

## 2025-04-02 PROCEDURE — 99024 POSTOP FOLLOW-UP VISIT: CPT

## 2025-04-02 PROCEDURE — 92526 ORAL FUNCTION THERAPY: CPT | Mod: GN

## 2025-04-02 RX ORDER — DEXAMETHASONE 4 MG/1
4 TABLET ORAL
Qty: 25 | Refills: 0 | Status: ACTIVE | COMMUNITY
Start: 2025-04-02 | End: 1900-01-01

## 2025-04-04 ENCOUNTER — APPOINTMENT (OUTPATIENT)
Dept: CT IMAGING | Facility: CLINIC | Age: 75
End: 2025-04-04
Payer: MEDICARE

## 2025-04-04 ENCOUNTER — OUTPATIENT (OUTPATIENT)
Dept: OUTPATIENT SERVICES | Facility: HOSPITAL | Age: 75
LOS: 1 days | End: 2025-04-04
Payer: COMMERCIAL

## 2025-04-04 DIAGNOSIS — Z98.890 OTHER SPECIFIED POSTPROCEDURAL STATES: Chronic | ICD-10-CM

## 2025-04-04 DIAGNOSIS — Z96.651 PRESENCE OF RIGHT ARTIFICIAL KNEE JOINT: Chronic | ICD-10-CM

## 2025-04-04 DIAGNOSIS — Z00.8 ENCOUNTER FOR OTHER GENERAL EXAMINATION: ICD-10-CM

## 2025-04-04 DIAGNOSIS — C11.9 MALIGNANT NEOPLASM OF NASOPHARYNX, UNSPECIFIED: ICD-10-CM

## 2025-04-04 PROCEDURE — 70491 CT SOFT TISSUE NECK W/DYE: CPT

## 2025-04-04 PROCEDURE — 70491 CT SOFT TISSUE NECK W/DYE: CPT | Mod: 26

## 2025-04-09 ENCOUNTER — OUTPATIENT (OUTPATIENT)
Dept: OUTPATIENT SERVICES | Facility: HOSPITAL | Age: 75
LOS: 1 days | End: 2025-04-09
Payer: COMMERCIAL

## 2025-04-09 ENCOUNTER — APPOINTMENT (OUTPATIENT)
Dept: NUCLEAR MEDICINE | Facility: IMAGING CENTER | Age: 75
End: 2025-04-09

## 2025-04-09 DIAGNOSIS — Z98.890 OTHER SPECIFIED POSTPROCEDURAL STATES: Chronic | ICD-10-CM

## 2025-04-09 DIAGNOSIS — C11.9 MALIGNANT NEOPLASM OF NASOPHARYNX, UNSPECIFIED: ICD-10-CM

## 2025-04-09 DIAGNOSIS — Z00.8 ENCOUNTER FOR OTHER GENERAL EXAMINATION: ICD-10-CM

## 2025-04-09 DIAGNOSIS — Z98.49 CATARACT EXTRACTION STATUS, UNSPECIFIED EYE: Chronic | ICD-10-CM

## 2025-04-09 DIAGNOSIS — Z96.651 PRESENCE OF RIGHT ARTIFICIAL KNEE JOINT: Chronic | ICD-10-CM

## 2025-04-09 PROCEDURE — 78815 PET IMAGE W/CT SKULL-THIGH: CPT

## 2025-04-09 PROCEDURE — 78815 PET IMAGE W/CT SKULL-THIGH: CPT | Mod: 26,PS

## 2025-04-09 PROCEDURE — A9552: CPT

## 2025-04-09 NOTE — ASU PATIENT PROFILE, ADULT - FALL HARM RISK - ATTEMPT OOB
Medically optimized. TAVR completed February 2025, so minimal aspirin hold would be desired. Pt thought your team told him 2 days prior, will defer to your aspirin hold recommendation.
No

## 2025-04-10 ENCOUNTER — OUTPATIENT (OUTPATIENT)
Dept: OUTPATIENT SERVICES | Facility: HOSPITAL | Age: 75
LOS: 1 days | End: 2025-04-10
Payer: COMMERCIAL

## 2025-04-10 ENCOUNTER — RESULT REVIEW (OUTPATIENT)
Age: 75
End: 2025-04-10

## 2025-04-10 ENCOUNTER — APPOINTMENT (OUTPATIENT)
Dept: ULTRASOUND IMAGING | Facility: IMAGING CENTER | Age: 75
End: 2025-04-10
Payer: MEDICARE

## 2025-04-10 DIAGNOSIS — C11.9 MALIGNANT NEOPLASM OF NASOPHARYNX, UNSPECIFIED: ICD-10-CM

## 2025-04-10 DIAGNOSIS — Z98.890 OTHER SPECIFIED POSTPROCEDURAL STATES: Chronic | ICD-10-CM

## 2025-04-10 DIAGNOSIS — Z98.49 CATARACT EXTRACTION STATUS, UNSPECIFIED EYE: Chronic | ICD-10-CM

## 2025-04-10 DIAGNOSIS — Z96.651 PRESENCE OF RIGHT ARTIFICIAL KNEE JOINT: Chronic | ICD-10-CM

## 2025-04-10 PROCEDURE — 88381 MICRODISSECTION MANUAL: CPT

## 2025-04-10 PROCEDURE — 20206 BIOPSY MUSCLE PERQ NEEDLE: CPT

## 2025-04-10 PROCEDURE — 88173 CYTOPATH EVAL FNA REPORT: CPT | Mod: 26

## 2025-04-10 PROCEDURE — 81455 SO/HL 51/>GSAP DNA/DNA&RNA: CPT

## 2025-04-10 PROCEDURE — 88305 TISSUE EXAM BY PATHOLOGIST: CPT | Mod: 26

## 2025-04-10 PROCEDURE — 88364 INSITU HYBRIDIZATION (FISH): CPT | Mod: 26

## 2025-04-10 PROCEDURE — 76942 ECHO GUIDE FOR BIOPSY: CPT | Mod: 26

## 2025-04-10 PROCEDURE — 88342 IMHCHEM/IMCYTCHM 1ST ANTB: CPT | Mod: 26,59

## 2025-04-10 PROCEDURE — 88360 TUMOR IMMUNOHISTOCHEM/MANUAL: CPT

## 2025-04-10 PROCEDURE — 88172 CYTP DX EVAL FNA 1ST EA SITE: CPT

## 2025-04-10 PROCEDURE — 88341 IMHCHEM/IMCYTCHM EA ADD ANTB: CPT | Mod: 26,59

## 2025-04-10 PROCEDURE — 88342 IMHCHEM/IMCYTCHM 1ST ANTB: CPT

## 2025-04-10 PROCEDURE — 88364 INSITU HYBRIDIZATION (FISH): CPT

## 2025-04-10 PROCEDURE — 76942 ECHO GUIDE FOR BIOPSY: CPT

## 2025-04-10 PROCEDURE — 88360 TUMOR IMMUNOHISTOCHEM/MANUAL: CPT | Mod: 26

## 2025-04-10 PROCEDURE — 88365 INSITU HYBRIDIZATION (FISH): CPT | Mod: 26,59

## 2025-04-10 PROCEDURE — 88305 TISSUE EXAM BY PATHOLOGIST: CPT

## 2025-04-10 PROCEDURE — 88341 IMHCHEM/IMCYTCHM EA ADD ANTB: CPT

## 2025-04-10 PROCEDURE — 88365 INSITU HYBRIDIZATION (FISH): CPT

## 2025-04-10 PROCEDURE — 88173 CYTOPATH EVAL FNA REPORT: CPT

## 2025-04-12 ENCOUNTER — OUTPATIENT (OUTPATIENT)
Dept: OUTPATIENT SERVICES | Facility: HOSPITAL | Age: 75
LOS: 1 days | Discharge: HOME | End: 2025-04-12

## 2025-04-12 DIAGNOSIS — Z98.890 OTHER SPECIFIED POSTPROCEDURAL STATES: Chronic | ICD-10-CM

## 2025-04-12 DIAGNOSIS — C11.9 MALIGNANT NEOPLASM OF NASOPHARYNX, UNSPECIFIED: ICD-10-CM

## 2025-04-12 DIAGNOSIS — Z96.651 PRESENCE OF RIGHT ARTIFICIAL KNEE JOINT: Chronic | ICD-10-CM

## 2025-04-12 DIAGNOSIS — Z96.652 PRESENCE OF LEFT ARTIFICIAL KNEE JOINT: Chronic | ICD-10-CM

## 2025-04-15 ENCOUNTER — RESULT REVIEW (OUTPATIENT)
Age: 75
End: 2025-04-15

## 2025-04-15 ENCOUNTER — APPOINTMENT (OUTPATIENT)
Dept: HEMATOLOGY ONCOLOGY | Facility: CLINIC | Age: 75
End: 2025-04-15
Payer: MEDICARE

## 2025-04-15 VITALS
HEART RATE: 95 BPM | RESPIRATION RATE: 18 BRPM | DIASTOLIC BLOOD PRESSURE: 85 MMHG | SYSTOLIC BLOOD PRESSURE: 125 MMHG | TEMPERATURE: 97 F | BODY MASS INDEX: 25.65 KG/M2 | WEIGHT: 158.93 LBS | OXYGEN SATURATION: 98 %

## 2025-04-15 DIAGNOSIS — T45.1X5A ANEMIA DUE TO ANTINEOPLASTIC CHEMOTHERAPY: ICD-10-CM

## 2025-04-15 DIAGNOSIS — C77.0 SECONDARY AND UNSPECIFIED MALIGNANT NEOPLASM OF LYMPH NODES OF HEAD, FACE AND NECK: ICD-10-CM

## 2025-04-15 DIAGNOSIS — G89.3 NEOPLASM RELATED PAIN (ACUTE) (CHRONIC): ICD-10-CM

## 2025-04-15 DIAGNOSIS — R53.83 OTHER FATIGUE: ICD-10-CM

## 2025-04-15 DIAGNOSIS — D64.81 ANEMIA DUE TO ANTINEOPLASTIC CHEMOTHERAPY: ICD-10-CM

## 2025-04-15 LAB
BASOPHILS # BLD AUTO: 0.01 K/UL — SIGNIFICANT CHANGE UP (ref 0–0.2)
BASOPHILS NFR BLD AUTO: 0.1 % — SIGNIFICANT CHANGE UP (ref 0–2)
EOSINOPHIL # BLD AUTO: 0.02 K/UL — SIGNIFICANT CHANGE UP (ref 0–0.5)
EOSINOPHIL NFR BLD AUTO: 0.1 % — SIGNIFICANT CHANGE UP (ref 0–6)
HCT VFR BLD CALC: 34.4 % — LOW (ref 39–50)
HGB BLD-MCNC: 11.5 G/DL — LOW (ref 13–17)
IMM GRANULOCYTES NFR BLD AUTO: 1.6 % — HIGH (ref 0–0.9)
LYMPHOCYTES # BLD AUTO: 0.81 K/UL — LOW (ref 1–3.3)
LYMPHOCYTES # BLD AUTO: 5.7 % — LOW (ref 13–44)
MCHC RBC-ENTMCNC: 33.4 G/DL — SIGNIFICANT CHANGE UP (ref 32–36)
MCHC RBC-ENTMCNC: 34.6 PG — HIGH (ref 27–34)
MCV RBC AUTO: 103.6 FL — HIGH (ref 80–100)
MONOCYTES # BLD AUTO: 0.91 K/UL — HIGH (ref 0–0.9)
MONOCYTES NFR BLD AUTO: 6.4 % — SIGNIFICANT CHANGE UP (ref 2–14)
NEUTROPHILS # BLD AUTO: 12.34 K/UL — HIGH (ref 1.8–7.4)
NEUTROPHILS NFR BLD AUTO: 86.1 % — HIGH (ref 43–77)
NRBC BLD AUTO-RTO: 0 /100 WBCS — SIGNIFICANT CHANGE UP (ref 0–0)
PLATELET # BLD AUTO: 318 K/UL — SIGNIFICANT CHANGE UP (ref 150–400)
RBC # BLD: 3.32 M/UL — LOW (ref 4.2–5.8)
RBC # FLD: 14.8 % — HIGH (ref 10.3–14.5)
RETICS #: 105.6 K/UL — SIGNIFICANT CHANGE UP (ref 25–125)
RETICS/RBC NFR: 3.2 % — HIGH (ref 0.5–2.5)
WBC # BLD: 14.32 K/UL — HIGH (ref 3.8–10.5)
WBC # FLD AUTO: 14.32 K/UL — HIGH (ref 3.8–10.5)

## 2025-04-15 PROCEDURE — G2211 COMPLEX E/M VISIT ADD ON: CPT

## 2025-04-15 PROCEDURE — 99215 OFFICE O/P EST HI 40 MIN: CPT

## 2025-04-16 ENCOUNTER — NON-APPOINTMENT (OUTPATIENT)
Age: 75
End: 2025-04-16

## 2025-04-16 LAB
ALBUMIN SERPL ELPH-MCNC: 3.9 G/DL
ALP BLD-CCNC: 60 U/L
ALT SERPL-CCNC: 43 U/L
ANION GAP SERPL CALC-SCNC: 15 MMOL/L
AST SERPL-CCNC: 21 U/L
BILIRUB SERPL-MCNC: 0.2 MG/DL
BUN SERPL-MCNC: 36 MG/DL
CALCIUM SERPL-MCNC: 9.8 MG/DL
CHLORIDE SERPL-SCNC: 97 MMOL/L
CO2 SERPL-SCNC: 25 MMOL/L
CREAT SERPL-MCNC: 0.97 MG/DL
EGFRCR SERPLBLD CKD-EPI 2021: 83 ML/MIN/1.73M2
FERRITIN SERPL-MCNC: 455 NG/ML
FOLATE SERPL-MCNC: >20 NG/ML
GLUCOSE SERPL-MCNC: 119 MG/DL
IRON SATN MFR SERPL: 20 %
IRON SERPL-MCNC: 64 UG/DL
POTASSIUM SERPL-SCNC: 4.6 MMOL/L
PROT SERPL-MCNC: 6.6 G/DL
SODIUM SERPL-SCNC: 137 MMOL/L
TIBC SERPL-MCNC: 319 UG/DL
TSH SERPL-ACNC: 2.33 UIU/ML
UIBC SERPL-MCNC: 255 UG/DL
VIT B12 SERPL-MCNC: 977 PG/ML

## 2025-04-17 ENCOUNTER — NON-APPOINTMENT (OUTPATIENT)
Age: 75
End: 2025-04-17

## 2025-04-17 LAB — NON-GYNECOLOGICAL CYTOLOGY STUDY: SIGNIFICANT CHANGE UP

## 2025-04-18 ENCOUNTER — APPOINTMENT (OUTPATIENT)
Dept: THORACIC SURGERY | Facility: CLINIC | Age: 75
End: 2025-04-18

## 2025-04-18 ENCOUNTER — APPOINTMENT (OUTPATIENT)
Dept: INFUSION THERAPY | Facility: HOSPITAL | Age: 75
End: 2025-04-18

## 2025-04-18 ENCOUNTER — NON-APPOINTMENT (OUTPATIENT)
Age: 75
End: 2025-04-18

## 2025-04-18 DIAGNOSIS — R21 RASH AND OTHER NONSPECIFIC SKIN ERUPTION: ICD-10-CM

## 2025-04-18 DIAGNOSIS — R11.2 NAUSEA WITH VOMITING, UNSPECIFIED: ICD-10-CM

## 2025-04-18 DIAGNOSIS — Z51.11 ENCOUNTER FOR ANTINEOPLASTIC CHEMOTHERAPY: ICD-10-CM

## 2025-04-18 PROCEDURE — 99215 OFFICE O/P EST HI 40 MIN: CPT

## 2025-04-19 PROBLEM — R21 RASH, SKIN: Status: ACTIVE | Noted: 2025-04-19

## 2025-04-19 RX ORDER — CLOTRIMAZOLE AND BETAMETHASONE DIPROPIONATE 10; .5 MG/G; MG/G
1-0.05 CREAM TOPICAL TWICE DAILY
Qty: 1 | Refills: 1 | Status: ACTIVE | COMMUNITY
Start: 2025-04-19 | End: 1900-01-01

## 2025-04-22 ENCOUNTER — APPOINTMENT (OUTPATIENT)
Dept: OTOLARYNGOLOGY | Facility: CLINIC | Age: 75
End: 2025-04-22
Payer: MEDICARE

## 2025-04-22 PROCEDURE — 99214 OFFICE O/P EST MOD 30 MIN: CPT

## 2025-04-23 ENCOUNTER — APPOINTMENT (OUTPATIENT)
Dept: GERIATRICS | Facility: CLINIC | Age: 75
End: 2025-04-23
Payer: MEDICARE

## 2025-04-23 VITALS
WEIGHT: 159.81 LBS | OXYGEN SATURATION: 98 % | TEMPERATURE: 97.6 F | RESPIRATION RATE: 18 BRPM | HEIGHT: 66.06 IN | DIASTOLIC BLOOD PRESSURE: 80 MMHG | HEART RATE: 90 BPM | BODY MASS INDEX: 25.68 KG/M2 | SYSTOLIC BLOOD PRESSURE: 117 MMHG

## 2025-04-23 DIAGNOSIS — C11.9 MALIGNANT NEOPLASM OF NASOPHARYNX, UNSPECIFIED: ICD-10-CM

## 2025-04-23 DIAGNOSIS — G89.3 NEOPLASM RELATED PAIN (ACUTE) (CHRONIC): ICD-10-CM

## 2025-04-23 DIAGNOSIS — Z51.5 ENCOUNTER FOR PALLIATIVE CARE: ICD-10-CM

## 2025-04-23 DIAGNOSIS — G47.9 SLEEP DISORDER, UNSPECIFIED: ICD-10-CM

## 2025-04-23 DIAGNOSIS — R13.10 DYSPHAGIA, UNSPECIFIED: ICD-10-CM

## 2025-04-23 PROCEDURE — 99205 OFFICE O/P NEW HI 60 MIN: CPT

## 2025-04-23 RX ORDER — OXYCODONE 10 MG/1
10 TABLET ORAL
Qty: 84 | Refills: 0 | Status: ACTIVE | COMMUNITY
Start: 2025-04-23 | End: 1900-01-01

## 2025-04-23 RX ORDER — METHADONE HYDROCHLORIDE 10 MG/5ML
10 SOLUTION ORAL TWICE DAILY
Qty: 60 | Refills: 0 | Status: ACTIVE | COMMUNITY
Start: 2025-04-23 | End: 1900-01-01

## 2025-04-24 ENCOUNTER — APPOINTMENT (OUTPATIENT)
Dept: OTOLARYNGOLOGY | Facility: CLINIC | Age: 75
End: 2025-04-24

## 2025-04-25 RX ORDER — HYDROMORPHONE HYDROCHLORIDE 4 MG/1
4 TABLET ORAL
Qty: 60 | Refills: 0 | Status: ACTIVE | COMMUNITY
Start: 2025-04-25 | End: 1900-01-01

## 2025-04-29 PROBLEM — G47.9 TROUBLE IN SLEEPING: Status: ACTIVE | Noted: 2025-04-29

## 2025-04-29 PROBLEM — R13.10 DYSPHAGIA: Status: ACTIVE | Noted: 2025-04-28

## 2025-04-29 PROBLEM — Z51.5 ENCOUNTER FOR PALLIATIVE CARE: Status: ACTIVE | Noted: 2025-04-29

## 2025-05-02 ENCOUNTER — APPOINTMENT (OUTPATIENT)
Dept: GERIATRICS | Facility: CLINIC | Age: 75
End: 2025-05-02

## 2025-05-06 ENCOUNTER — RESULT REVIEW (OUTPATIENT)
Age: 75
End: 2025-05-06

## 2025-05-06 ENCOUNTER — OUTPATIENT (OUTPATIENT)
Dept: OUTPATIENT SERVICES | Facility: HOSPITAL | Age: 75
LOS: 1 days | End: 2025-05-06
Payer: COMMERCIAL

## 2025-05-06 ENCOUNTER — APPOINTMENT (OUTPATIENT)
Dept: OTOLARYNGOLOGY | Facility: CLINIC | Age: 75
End: 2025-05-06
Payer: MEDICARE

## 2025-05-06 ENCOUNTER — APPOINTMENT (OUTPATIENT)
Dept: GERIATRICS | Facility: CLINIC | Age: 75
End: 2025-05-06
Payer: MEDICARE

## 2025-05-06 ENCOUNTER — APPOINTMENT (OUTPATIENT)
Dept: INFUSION THERAPY | Facility: HOSPITAL | Age: 75
End: 2025-05-06

## 2025-05-06 ENCOUNTER — APPOINTMENT (OUTPATIENT)
Dept: ULTRASOUND IMAGING | Facility: IMAGING CENTER | Age: 75
End: 2025-05-06
Payer: MEDICARE

## 2025-05-06 ENCOUNTER — APPOINTMENT (OUTPATIENT)
Dept: HEMATOLOGY ONCOLOGY | Facility: CLINIC | Age: 75
End: 2025-05-06
Payer: MEDICARE

## 2025-05-06 DIAGNOSIS — D64.81 ANEMIA DUE TO ANTINEOPLASTIC CHEMOTHERAPY: ICD-10-CM

## 2025-05-06 DIAGNOSIS — Z00.8 ENCOUNTER FOR OTHER GENERAL EXAMINATION: ICD-10-CM

## 2025-05-06 DIAGNOSIS — R20.2 PARESTHESIA OF SKIN: ICD-10-CM

## 2025-05-06 DIAGNOSIS — Z98.890 OTHER SPECIFIED POSTPROCEDURAL STATES: Chronic | ICD-10-CM

## 2025-05-06 DIAGNOSIS — R07.0 PAIN IN THROAT: ICD-10-CM

## 2025-05-06 DIAGNOSIS — R53.83 OTHER FATIGUE: ICD-10-CM

## 2025-05-06 DIAGNOSIS — Z51.5 ENCOUNTER FOR PALLIATIVE CARE: ICD-10-CM

## 2025-05-06 DIAGNOSIS — Z96.651 PRESENCE OF RIGHT ARTIFICIAL KNEE JOINT: Chronic | ICD-10-CM

## 2025-05-06 DIAGNOSIS — G89.3 NEOPLASM RELATED PAIN (ACUTE) (CHRONIC): ICD-10-CM

## 2025-05-06 DIAGNOSIS — Z98.49 CATARACT EXTRACTION STATUS, UNSPECIFIED EYE: Chronic | ICD-10-CM

## 2025-05-06 DIAGNOSIS — R52 PAIN, UNSPECIFIED: ICD-10-CM

## 2025-05-06 DIAGNOSIS — T45.1X5A ANEMIA DUE TO ANTINEOPLASTIC CHEMOTHERAPY: ICD-10-CM

## 2025-05-06 DIAGNOSIS — C77.0 SECONDARY AND UNSPECIFIED MALIGNANT NEOPLASM OF LYMPH NODES OF HEAD, FACE AND NECK: ICD-10-CM

## 2025-05-06 DIAGNOSIS — K11.7 DISTURBANCES OF SALIVARY SECRETION: ICD-10-CM

## 2025-05-06 DIAGNOSIS — G47.9 SLEEP DISORDER, UNSPECIFIED: ICD-10-CM

## 2025-05-06 DIAGNOSIS — K59.00 CONSTIPATION, UNSPECIFIED: ICD-10-CM

## 2025-05-06 LAB
ALBUMIN SERPL ELPH-MCNC: 3.7 G/DL — SIGNIFICANT CHANGE UP (ref 3.3–5)
ALP SERPL-CCNC: 73 U/L — SIGNIFICANT CHANGE UP (ref 40–120)
ALT FLD-CCNC: 15 U/L — SIGNIFICANT CHANGE UP (ref 10–45)
ANION GAP SERPL CALC-SCNC: 11 MMOL/L — SIGNIFICANT CHANGE UP (ref 5–17)
AST SERPL-CCNC: 21 U/L — SIGNIFICANT CHANGE UP (ref 10–40)
BASOPHILS # BLD AUTO: 0.03 K/UL — SIGNIFICANT CHANGE UP (ref 0–0.2)
BASOPHILS NFR BLD AUTO: 0.3 % — SIGNIFICANT CHANGE UP (ref 0–2)
BILIRUB SERPL-MCNC: 0.2 MG/DL — SIGNIFICANT CHANGE UP (ref 0.2–1.2)
BUN SERPL-MCNC: 22 MG/DL — SIGNIFICANT CHANGE UP (ref 7–23)
CALCIUM SERPL-MCNC: 9.7 MG/DL — SIGNIFICANT CHANGE UP (ref 8.4–10.5)
CHLORIDE SERPL-SCNC: 93 MMOL/L — LOW (ref 96–108)
CO2 SERPL-SCNC: 29 MMOL/L — SIGNIFICANT CHANGE UP (ref 22–31)
CREAT SERPL-MCNC: 0.84 MG/DL — SIGNIFICANT CHANGE UP (ref 0.5–1.3)
EGFR: 93 ML/MIN/1.73M2 — SIGNIFICANT CHANGE UP
EGFR: 93 ML/MIN/1.73M2 — SIGNIFICANT CHANGE UP
EOSINOPHIL # BLD AUTO: 0.08 K/UL — SIGNIFICANT CHANGE UP (ref 0–0.5)
EOSINOPHIL NFR BLD AUTO: 0.8 % — SIGNIFICANT CHANGE UP (ref 0–6)
GLUCOSE SERPL-MCNC: 91 MG/DL — SIGNIFICANT CHANGE UP (ref 70–99)
HCT VFR BLD CALC: 31.9 % — LOW (ref 39–50)
HGB BLD-MCNC: 10.6 G/DL — LOW (ref 13–17)
IMM GRANULOCYTES NFR BLD AUTO: 0.4 % — SIGNIFICANT CHANGE UP (ref 0–0.9)
LYMPHOCYTES # BLD AUTO: 1 K/UL — SIGNIFICANT CHANGE UP (ref 1–3.3)
LYMPHOCYTES # BLD AUTO: 9.8 % — LOW (ref 13–44)
MCHC RBC-ENTMCNC: 33.2 G/DL — SIGNIFICANT CHANGE UP (ref 32–36)
MCHC RBC-ENTMCNC: 34.3 PG — HIGH (ref 27–34)
MCV RBC AUTO: 103.2 FL — HIGH (ref 80–100)
MONOCYTES # BLD AUTO: 1.05 K/UL — HIGH (ref 0–0.9)
MONOCYTES NFR BLD AUTO: 10.3 % — SIGNIFICANT CHANGE UP (ref 2–14)
NEUTROPHILS # BLD AUTO: 7.96 K/UL — HIGH (ref 1.8–7.4)
NEUTROPHILS NFR BLD AUTO: 78.4 % — HIGH (ref 43–77)
NRBC BLD AUTO-RTO: 0 /100 WBCS — SIGNIFICANT CHANGE UP (ref 0–0)
PLATELET # BLD AUTO: 204 K/UL — SIGNIFICANT CHANGE UP (ref 150–400)
POTASSIUM SERPL-MCNC: 4.7 MMOL/L — SIGNIFICANT CHANGE UP (ref 3.5–5.3)
POTASSIUM SERPL-SCNC: 4.7 MMOL/L — SIGNIFICANT CHANGE UP (ref 3.5–5.3)
PROT SERPL-MCNC: 6.8 G/DL — SIGNIFICANT CHANGE UP (ref 6–8.3)
RBC # BLD: 3.09 M/UL — LOW (ref 4.2–5.8)
RBC # FLD: 13.2 % — SIGNIFICANT CHANGE UP (ref 10.3–14.5)
SODIUM SERPL-SCNC: 133 MMOL/L — LOW (ref 135–145)
WBC # BLD: 10.16 K/UL — SIGNIFICANT CHANGE UP (ref 3.8–10.5)
WBC # FLD AUTO: 10.16 K/UL — SIGNIFICANT CHANGE UP (ref 3.8–10.5)

## 2025-05-06 PROCEDURE — 76536 US EXAM OF HEAD AND NECK: CPT

## 2025-05-06 PROCEDURE — 76536 US EXAM OF HEAD AND NECK: CPT | Mod: 26

## 2025-05-06 PROCEDURE — 99214 OFFICE O/P EST MOD 30 MIN: CPT

## 2025-05-06 PROCEDURE — 99215 OFFICE O/P EST HI 40 MIN: CPT

## 2025-05-14 ENCOUNTER — APPOINTMENT (OUTPATIENT)
Dept: OTOLARYNGOLOGY | Facility: CLINIC | Age: 75
End: 2025-05-14
Payer: MEDICARE

## 2025-05-14 PROCEDURE — 99214 OFFICE O/P EST MOD 30 MIN: CPT | Mod: 25

## 2025-05-14 PROCEDURE — 92526 ORAL FUNCTION THERAPY: CPT | Mod: GN

## 2025-05-14 PROCEDURE — 31575 DIAGNOSTIC LARYNGOSCOPY: CPT

## 2025-05-20 ENCOUNTER — APPOINTMENT (OUTPATIENT)
Dept: HEMATOLOGY ONCOLOGY | Facility: CLINIC | Age: 75
End: 2025-05-20
Payer: MEDICARE

## 2025-05-20 ENCOUNTER — RESULT REVIEW (OUTPATIENT)
Age: 75
End: 2025-05-20

## 2025-05-20 ENCOUNTER — APPOINTMENT (OUTPATIENT)
Dept: GERIATRICS | Facility: CLINIC | Age: 75
End: 2025-05-20
Payer: MEDICARE

## 2025-05-20 ENCOUNTER — NON-APPOINTMENT (OUTPATIENT)
Age: 75
End: 2025-05-20

## 2025-05-20 ENCOUNTER — APPOINTMENT (OUTPATIENT)
Dept: INFUSION THERAPY | Facility: HOSPITAL | Age: 75
End: 2025-05-20

## 2025-05-20 VITALS
SYSTOLIC BLOOD PRESSURE: 121 MMHG | BODY MASS INDEX: 26.58 KG/M2 | HEART RATE: 116 BPM | DIASTOLIC BLOOD PRESSURE: 69 MMHG | RESPIRATION RATE: 18 BRPM | TEMPERATURE: 97.1 F | WEIGHT: 164.99 LBS | OXYGEN SATURATION: 97 %

## 2025-05-20 DIAGNOSIS — L58.9 RADIODERMATITIS, UNSPECIFIED: ICD-10-CM

## 2025-05-20 DIAGNOSIS — Z51.5 ENCOUNTER FOR PALLIATIVE CARE: ICD-10-CM

## 2025-05-20 DIAGNOSIS — D64.9 ANEMIA, UNSPECIFIED: ICD-10-CM

## 2025-05-20 DIAGNOSIS — R13.10 DYSPHAGIA, UNSPECIFIED: ICD-10-CM

## 2025-05-20 DIAGNOSIS — R07.0 PAIN IN THROAT: ICD-10-CM

## 2025-05-20 DIAGNOSIS — K59.00 CONSTIPATION, UNSPECIFIED: ICD-10-CM

## 2025-05-20 DIAGNOSIS — R11.0 NAUSEA: ICD-10-CM

## 2025-05-20 DIAGNOSIS — R20.2 PARESTHESIA OF SKIN: ICD-10-CM

## 2025-05-20 DIAGNOSIS — C77.0 SECONDARY AND UNSPECIFIED MALIGNANT NEOPLASM OF LYMPH NODES OF HEAD, FACE AND NECK: ICD-10-CM

## 2025-05-20 DIAGNOSIS — C11.9 MALIGNANT NEOPLASM OF NASOPHARYNX, UNSPECIFIED: ICD-10-CM

## 2025-05-20 DIAGNOSIS — K11.7 DISTURBANCES OF SALIVARY SECRETION: ICD-10-CM

## 2025-05-20 DIAGNOSIS — G47.9 SLEEP DISORDER, UNSPECIFIED: ICD-10-CM

## 2025-05-20 DIAGNOSIS — G89.3 NEOPLASM RELATED PAIN (ACUTE) (CHRONIC): ICD-10-CM

## 2025-05-20 LAB
ALBUMIN SERPL ELPH-MCNC: 3.4 G/DL — SIGNIFICANT CHANGE UP (ref 3.3–5)
ALP SERPL-CCNC: 71 U/L — SIGNIFICANT CHANGE UP (ref 40–120)
ALT FLD-CCNC: 28 U/L — SIGNIFICANT CHANGE UP (ref 10–45)
ANION GAP SERPL CALC-SCNC: 12 MMOL/L — SIGNIFICANT CHANGE UP (ref 5–17)
AST SERPL-CCNC: 25 U/L — SIGNIFICANT CHANGE UP (ref 10–40)
BASOPHILS # BLD AUTO: 0.02 K/UL — SIGNIFICANT CHANGE UP (ref 0–0.2)
BASOPHILS NFR BLD AUTO: 0.2 % — SIGNIFICANT CHANGE UP (ref 0–2)
BILIRUB SERPL-MCNC: <0.2 MG/DL — SIGNIFICANT CHANGE UP (ref 0.2–1.2)
BUN SERPL-MCNC: 19 MG/DL — SIGNIFICANT CHANGE UP (ref 7–23)
CALCIUM SERPL-MCNC: 9.3 MG/DL — SIGNIFICANT CHANGE UP (ref 8.4–10.5)
CHLORIDE SERPL-SCNC: 96 MMOL/L — SIGNIFICANT CHANGE UP (ref 96–108)
CO2 SERPL-SCNC: 28 MMOL/L — SIGNIFICANT CHANGE UP (ref 22–31)
CREAT SERPL-MCNC: 0.82 MG/DL — SIGNIFICANT CHANGE UP (ref 0.5–1.3)
EGFR: 93 ML/MIN/1.73M2 — SIGNIFICANT CHANGE UP
EGFR: 93 ML/MIN/1.73M2 — SIGNIFICANT CHANGE UP
EOSINOPHIL # BLD AUTO: 0.04 K/UL — SIGNIFICANT CHANGE UP (ref 0–0.5)
EOSINOPHIL NFR BLD AUTO: 0.4 % — SIGNIFICANT CHANGE UP (ref 0–6)
GLUCOSE SERPL-MCNC: 107 MG/DL — HIGH (ref 70–99)
HCT VFR BLD CALC: 31 % — LOW (ref 39–50)
HGB BLD-MCNC: 10 G/DL — LOW (ref 13–17)
IMM GRANULOCYTES NFR BLD AUTO: 0.4 % — SIGNIFICANT CHANGE UP (ref 0–0.9)
LYMPHOCYTES # BLD AUTO: 0.86 K/UL — LOW (ref 1–3.3)
LYMPHOCYTES # BLD AUTO: 9.5 % — LOW (ref 13–44)
MCHC RBC-ENTMCNC: 32.3 G/DL — SIGNIFICANT CHANGE UP (ref 32–36)
MCHC RBC-ENTMCNC: 32.8 PG — SIGNIFICANT CHANGE UP (ref 27–34)
MCV RBC AUTO: 101.6 FL — HIGH (ref 80–100)
MONOCYTES # BLD AUTO: 1.11 K/UL — HIGH (ref 0–0.9)
MONOCYTES NFR BLD AUTO: 12.2 % — SIGNIFICANT CHANGE UP (ref 2–14)
NEUTROPHILS # BLD AUTO: 7.03 K/UL — SIGNIFICANT CHANGE UP (ref 1.8–7.4)
NEUTROPHILS NFR BLD AUTO: 77.3 % — HIGH (ref 43–77)
NRBC BLD AUTO-RTO: 0 /100 WBCS — SIGNIFICANT CHANGE UP (ref 0–0)
PLATELET # BLD AUTO: 275 K/UL — SIGNIFICANT CHANGE UP (ref 150–400)
POTASSIUM SERPL-MCNC: 4.1 MMOL/L — SIGNIFICANT CHANGE UP (ref 3.5–5.3)
POTASSIUM SERPL-SCNC: 4.1 MMOL/L — SIGNIFICANT CHANGE UP (ref 3.5–5.3)
PROT SERPL-MCNC: 6.5 G/DL — SIGNIFICANT CHANGE UP (ref 6–8.3)
RBC # BLD: 3.05 M/UL — LOW (ref 4.2–5.8)
RBC # FLD: 13 % — SIGNIFICANT CHANGE UP (ref 10.3–14.5)
SODIUM SERPL-SCNC: 135 MMOL/L — SIGNIFICANT CHANGE UP (ref 135–145)
WBC # BLD: 9.1 K/UL — SIGNIFICANT CHANGE UP (ref 3.8–10.5)
WBC # FLD AUTO: 9.1 K/UL — SIGNIFICANT CHANGE UP (ref 3.8–10.5)

## 2025-05-20 PROCEDURE — 99215 OFFICE O/P EST HI 40 MIN: CPT

## 2025-05-20 PROCEDURE — 99214 OFFICE O/P EST MOD 30 MIN: CPT

## 2025-05-20 RX ORDER — DOCUSATE SODIUM 50 MG/5ML
100 LIQUID ORAL TWICE DAILY
Qty: 500 | Refills: 1 | Status: ACTIVE | COMMUNITY
Start: 2025-05-20 | End: 1900-01-01

## 2025-05-20 RX ORDER — METOCLOPRAMIDE HYDROCHLORIDE 5 MG/5ML
5 SOLUTION ORAL EVERY 6 HOURS
Qty: 1 | Refills: 0 | Status: ACTIVE | COMMUNITY
Start: 2025-05-20 | End: 1900-01-01

## 2025-05-20 RX ORDER — ASPIRIN 195 MG
176 SUPPOSITORY, RECTAL RECTAL DAILY
Qty: 120 | Refills: 0 | Status: DISCONTINUED | COMMUNITY
Start: 2025-05-20 | End: 2025-05-20

## 2025-05-20 RX ORDER — OXYCODONE HYDROCHLORIDE 5 MG/5ML
5 SOLUTION ORAL
Qty: 420 | Refills: 0 | Status: ACTIVE | COMMUNITY
Start: 2025-05-20

## 2025-05-21 LAB
T4 FREE SERPL-MCNC: 1 NG/DL — SIGNIFICANT CHANGE UP (ref 0.9–1.8)
T4 FREE+ TSH PNL SERPL: 14 UIU/ML — HIGH (ref 0.27–4.2)

## 2025-05-29 ENCOUNTER — APPOINTMENT (OUTPATIENT)
Dept: OTOLARYNGOLOGY | Facility: CLINIC | Age: 75
End: 2025-05-29
Payer: MEDICARE

## 2025-05-29 ENCOUNTER — APPOINTMENT (OUTPATIENT)
Dept: CT IMAGING | Facility: IMAGING CENTER | Age: 75
End: 2025-05-29

## 2025-05-29 VITALS — BODY MASS INDEX: 26.36 KG/M2 | WEIGHT: 164 LBS | HEIGHT: 66 IN

## 2025-05-29 DIAGNOSIS — H90.3 SENSORINEURAL HEARING LOSS, BILATERAL: ICD-10-CM

## 2025-05-29 DIAGNOSIS — H69.93 UNSPECIFIED EUSTACHIAN TUBE DISORDER, BILATERAL: ICD-10-CM

## 2025-05-29 PROCEDURE — 92504 EAR MICROSCOPY EXAMINATION: CPT

## 2025-05-29 PROCEDURE — 99213 OFFICE O/P EST LOW 20 MIN: CPT | Mod: 25

## 2025-06-03 ENCOUNTER — RESULT REVIEW (OUTPATIENT)
Age: 75
End: 2025-06-03

## 2025-06-03 ENCOUNTER — APPOINTMENT (OUTPATIENT)
Dept: HEMATOLOGY ONCOLOGY | Facility: CLINIC | Age: 75
End: 2025-06-03
Payer: MEDICARE

## 2025-06-03 ENCOUNTER — APPOINTMENT (OUTPATIENT)
Dept: GERIATRICS | Facility: CLINIC | Age: 75
End: 2025-06-03
Payer: MEDICARE

## 2025-06-03 ENCOUNTER — APPOINTMENT (OUTPATIENT)
Dept: INFUSION THERAPY | Facility: HOSPITAL | Age: 75
End: 2025-06-03

## 2025-06-03 DIAGNOSIS — Z51.5 ENCOUNTER FOR PALLIATIVE CARE: ICD-10-CM

## 2025-06-03 DIAGNOSIS — K11.7 DISTURBANCES OF SALIVARY SECRETION: ICD-10-CM

## 2025-06-03 DIAGNOSIS — R53.83 OTHER FATIGUE: ICD-10-CM

## 2025-06-03 DIAGNOSIS — R13.10 DYSPHAGIA, UNSPECIFIED: ICD-10-CM

## 2025-06-03 DIAGNOSIS — C77.0 SECONDARY AND UNSPECIFIED MALIGNANT NEOPLASM OF LYMPH NODES OF HEAD, FACE AND NECK: ICD-10-CM

## 2025-06-03 DIAGNOSIS — K59.00 CONSTIPATION, UNSPECIFIED: ICD-10-CM

## 2025-06-03 DIAGNOSIS — R11.0 NAUSEA: ICD-10-CM

## 2025-06-03 DIAGNOSIS — G89.3 NEOPLASM RELATED PAIN (ACUTE) (CHRONIC): ICD-10-CM

## 2025-06-03 DIAGNOSIS — R20.2 PARESTHESIA OF SKIN: ICD-10-CM

## 2025-06-03 DIAGNOSIS — D64.9 ANEMIA, UNSPECIFIED: ICD-10-CM

## 2025-06-03 PROCEDURE — 99214 OFFICE O/P EST MOD 30 MIN: CPT

## 2025-06-03 PROCEDURE — 99215 OFFICE O/P EST HI 40 MIN: CPT

## 2025-06-04 ENCOUNTER — APPOINTMENT (OUTPATIENT)
Dept: OTOLARYNGOLOGY | Facility: CLINIC | Age: 75
End: 2025-06-04

## 2025-06-04 ENCOUNTER — APPOINTMENT (OUTPATIENT)
Dept: PHYSICAL MEDICINE AND REHAB | Facility: CLINIC | Age: 75
End: 2025-06-04

## 2025-06-04 ENCOUNTER — APPOINTMENT (OUTPATIENT)
Dept: OTOLARYNGOLOGY | Facility: CLINIC | Age: 75
End: 2025-06-04
Payer: MEDICARE

## 2025-06-04 DIAGNOSIS — J38.00 PARALYSIS OF VOCAL CORDS AND LARYNX, UNSPECIFIED: ICD-10-CM

## 2025-06-04 LAB
ALBUMIN SERPL ELPH-MCNC: 3.6 G/DL — SIGNIFICANT CHANGE UP (ref 3.3–5)
ALP SERPL-CCNC: 75 U/L — SIGNIFICANT CHANGE UP (ref 40–120)
ALT FLD-CCNC: 22 U/L — SIGNIFICANT CHANGE UP (ref 10–50)
ANION GAP SERPL CALC-SCNC: 18 MMOL/L — HIGH (ref 5–17)
AST SERPL-CCNC: 21 U/L — SIGNIFICANT CHANGE UP (ref 10–40)
BASOPHILS # BLD AUTO: 0.03 K/UL — SIGNIFICANT CHANGE UP (ref 0–0.2)
BASOPHILS NFR BLD AUTO: 0.3 % — SIGNIFICANT CHANGE UP (ref 0–2)
BILIRUB SERPL-MCNC: <0.2 MG/DL — SIGNIFICANT CHANGE UP (ref 0.2–1.2)
BUN SERPL-MCNC: 21 MG/DL — SIGNIFICANT CHANGE UP (ref 7–23)
CALCIUM SERPL-MCNC: 9.7 MG/DL — SIGNIFICANT CHANGE UP (ref 8.4–10.5)
CHLORIDE SERPL-SCNC: 93 MMOL/L — LOW (ref 96–108)
CO2 SERPL-SCNC: 24 MMOL/L — SIGNIFICANT CHANGE UP (ref 22–31)
CREAT SERPL-MCNC: 0.93 MG/DL — SIGNIFICANT CHANGE UP (ref 0.5–1.3)
EGFR: 87 ML/MIN/1.73M2 — SIGNIFICANT CHANGE UP
EGFR: 87 ML/MIN/1.73M2 — SIGNIFICANT CHANGE UP
EOSINOPHIL # BLD AUTO: 0.13 K/UL — SIGNIFICANT CHANGE UP (ref 0–0.5)
EOSINOPHIL NFR BLD AUTO: 1.4 % — SIGNIFICANT CHANGE UP (ref 0–6)
GLUCOSE SERPL-MCNC: 79 MG/DL — SIGNIFICANT CHANGE UP (ref 70–99)
HCT VFR BLD CALC: 34.3 % — LOW (ref 39–50)
HGB BLD-MCNC: 10.9 G/DL — LOW (ref 13–17)
IMM GRANULOCYTES NFR BLD AUTO: 0.4 % — SIGNIFICANT CHANGE UP (ref 0–0.9)
LYMPHOCYTES # BLD AUTO: 1.16 K/UL — SIGNIFICANT CHANGE UP (ref 1–3.3)
LYMPHOCYTES # BLD AUTO: 12.5 % — LOW (ref 13–44)
MCHC RBC-ENTMCNC: 31.8 G/DL — LOW (ref 32–36)
MCHC RBC-ENTMCNC: 32.8 PG — SIGNIFICANT CHANGE UP (ref 27–34)
MCV RBC AUTO: 103.3 FL — HIGH (ref 80–100)
MONOCYTES # BLD AUTO: 1.17 K/UL — HIGH (ref 0–0.9)
MONOCYTES NFR BLD AUTO: 12.6 % — SIGNIFICANT CHANGE UP (ref 2–14)
NEUTROPHILS # BLD AUTO: 6.77 K/UL — SIGNIFICANT CHANGE UP (ref 1.8–7.4)
NEUTROPHILS NFR BLD AUTO: 72.8 % — SIGNIFICANT CHANGE UP (ref 43–77)
PLATELET # BLD AUTO: 330 K/UL — SIGNIFICANT CHANGE UP (ref 150–400)
POTASSIUM SERPL-MCNC: 4.7 MMOL/L — SIGNIFICANT CHANGE UP (ref 3.5–5.3)
POTASSIUM SERPL-SCNC: 4.7 MMOL/L — SIGNIFICANT CHANGE UP (ref 3.5–5.3)
PROT SERPL-MCNC: 6.9 G/DL — SIGNIFICANT CHANGE UP (ref 6–8.3)
RBC # BLD: 3.32 M/UL — LOW (ref 4.2–5.8)
RBC # FLD: 13.3 % — SIGNIFICANT CHANGE UP (ref 10.3–14.5)
SODIUM SERPL-SCNC: 136 MMOL/L — SIGNIFICANT CHANGE UP (ref 135–145)
WBC # BLD: 9.3 K/UL — SIGNIFICANT CHANGE UP (ref 3.8–10.5)
WBC # FLD AUTO: 9.3 K/UL — SIGNIFICANT CHANGE UP (ref 3.8–10.5)

## 2025-06-04 PROCEDURE — 99214 OFFICE O/P EST MOD 30 MIN: CPT | Mod: 25

## 2025-06-04 PROCEDURE — 31575 DIAGNOSTIC LARYNGOSCOPY: CPT

## 2025-06-07 ENCOUNTER — TRANSCRIPTION ENCOUNTER (OUTPATIENT)
Age: 75
End: 2025-06-07

## 2025-06-09 ENCOUNTER — TRANSCRIPTION ENCOUNTER (OUTPATIENT)
Age: 75
End: 2025-06-09

## 2025-06-10 ENCOUNTER — APPOINTMENT (OUTPATIENT)
Dept: NUCLEAR MEDICINE | Facility: CLINIC | Age: 75
End: 2025-06-10
Payer: MEDICARE

## 2025-06-10 PROCEDURE — A9552: CPT

## 2025-06-10 PROCEDURE — 78815 PET IMAGE W/CT SKULL-THIGH: CPT | Mod: PS

## 2025-06-11 ENCOUNTER — APPOINTMENT (OUTPATIENT)
Dept: OTOLARYNGOLOGY | Facility: CLINIC | Age: 75
End: 2025-06-11
Payer: MEDICARE

## 2025-06-11 PROCEDURE — 99214 OFFICE O/P EST MOD 30 MIN: CPT

## 2025-06-17 ENCOUNTER — APPOINTMENT (OUTPATIENT)
Dept: HEMATOLOGY ONCOLOGY | Facility: CLINIC | Age: 75
End: 2025-06-17
Payer: MEDICARE

## 2025-06-17 ENCOUNTER — NON-APPOINTMENT (OUTPATIENT)
Age: 75
End: 2025-06-17

## 2025-06-17 ENCOUNTER — APPOINTMENT (OUTPATIENT)
Dept: INFUSION THERAPY | Facility: HOSPITAL | Age: 75
End: 2025-06-17

## 2025-06-17 VITALS
OXYGEN SATURATION: 96 % | WEIGHT: 162.92 LBS | BODY MASS INDEX: 26.3 KG/M2 | SYSTOLIC BLOOD PRESSURE: 108 MMHG | RESPIRATION RATE: 18 BRPM | TEMPERATURE: 98 F | HEART RATE: 98 BPM | DIASTOLIC BLOOD PRESSURE: 73 MMHG

## 2025-06-17 PROCEDURE — G2211 COMPLEX E/M VISIT ADD ON: CPT

## 2025-06-17 PROCEDURE — 99215 OFFICE O/P EST HI 40 MIN: CPT

## 2025-06-24 ENCOUNTER — TRANSCRIPTION ENCOUNTER (OUTPATIENT)
Age: 75
End: 2025-06-24

## 2025-06-24 ENCOUNTER — APPOINTMENT (OUTPATIENT)
Dept: INFUSION THERAPY | Facility: HOSPITAL | Age: 75
End: 2025-06-24

## 2025-06-24 ENCOUNTER — APPOINTMENT (OUTPATIENT)
Dept: HEMATOLOGY ONCOLOGY | Facility: CLINIC | Age: 75
End: 2025-06-24
Payer: MEDICARE

## 2025-06-24 ENCOUNTER — RESULT REVIEW (OUTPATIENT)
Age: 75
End: 2025-06-24

## 2025-06-24 ENCOUNTER — APPOINTMENT (OUTPATIENT)
Dept: HEMATOLOGY ONCOLOGY | Facility: CLINIC | Age: 75
End: 2025-06-24

## 2025-06-24 PROBLEM — Z01.812 BLOOD TESTS PRIOR TO TREATMENT OR PROCEDURE: Status: ACTIVE | Noted: 2025-01-01

## 2025-06-24 PROBLEM — R11.2 NAUSEA AND/OR VOMITING: Status: ACTIVE | Noted: 2025-01-01

## 2025-06-24 PROCEDURE — 99214 OFFICE O/P EST MOD 30 MIN: CPT

## 2025-06-25 ENCOUNTER — NON-APPOINTMENT (OUTPATIENT)
Age: 75
End: 2025-06-25

## 2025-06-27 NOTE — ED ADULT TRIAGE NOTE - CHIEF COMPLAINT QUOTE
C/O abdominal pain. nausea vomiting. last Chemo 6/24. No complaints of chest pain, headache, , dizziness,   SOB, fever, chills verbalized. phx nasopharyngeal cancer

## 2025-06-27 NOTE — ED ADULT NURSE NOTE - OBJECTIVE STATEMENT
pt received to room 1, A&Ox4. pt primarily [place] speaking, son at bedside for translation as needed. pt hx nasopharyngeal ca, s/p radiation therapy and last chemo 6/24. pt endorsing generalized abdominal pain with associated nausea. pt denies headache, dizziness,blurry vision, SOB, chest pain, urinary symptoms. pt received in room placed on CM noted to be tachy to 160s-170s, stat EKG performed. vagal maneuvers performed, improvement in HR noted. 20G IV placed to left forearm and right hand, labs collected., medicated per MAR IVF infusing. placed on zoll monitor. pt with PEG tube to LLQ, skin clean/intact surrounding site. pt noted to have large rash to right side of neck, no drainage noted. safety maintained throughout, call bell in reach

## 2025-06-27 NOTE — ED ADULT NURSE NOTE - NS_SISCREENINGSR_GEN_ALL_ED
You can access the FollowMyHealth Patient Portal offered by [facility] by registering at the following website: [email or web address]. By joining [facility]’s FollowMyHealth portal, you will also be able to view your health information using other applications (apps) compatible with our system. Negative

## 2025-06-27 NOTE — ED ADULT TRIAGE NOTE - GLASGOW COMA SCALE: BEST VERBAL RESPONSE, MLM
SUBJECTIVE  Damon Yoo is 73 y.o. male  Corrected distance visual acuity was 20/25 -1 in the right eye and 20/30 -1 in the left eye.   Chief Complaint   Patient presents with    Glaucoma     Pt reports for 4m HVF, GOCT, dil. Denies any pain or irritation. Va stable. 100% compliant with gtts.           HPI     Glaucoma     Additional comments: Pt reports for 4m HVF, GOCT, dil. Denies any pain or   irritation. Va stable. 100% compliant with gtts.            Comments    1. Mod COAG- No FHx (init 32/27 on 3/10 with C/D .65/.60) Goal <18, new   goal = 15 6/2017  SLT OD 12/11/14 (20-16)  SLT OS 2/25/15 (20-16)  2. Mild NSC  3. LLL Cyst Excision on 6/13/12    Latanoprost QHS OU  Timolol qam OU            Last edited by Romaine Drake on 4/17/2023  8:17 AM.         Assessment /Plan :  1. Primary open angle glaucoma of both eyes, moderate stage Doing well, intraocular pressure (IOP) within acceptable range relative to target IOP and no evidence of progression. Continue current treatment. Reviewed importance of continued compliance with treatment and follow up.      Patient instructed to continue using the following glaucoma medication as follows:  Latanoprost one drop in each eye nightly and Timolol one drop both eyes daily    Return to clinic in 4 months  or as needed.  With IOP Check     2. Nuclear senile cataract of both eyes - monitor for now                (V5) oriented

## 2025-06-28 NOTE — ED ADULT NURSE REASSESSMENT NOTE - GENERAL PATIENT STATE
Pt axo3 resting quielty/comfortable appearance/cooperative/improvement verbalized/family/SO at bedside

## 2025-06-28 NOTE — CONSULT NOTE ADULT - ASSESSMENT
75M with remote smoking history, recurrent/metastatic nasopharyngeal carcinoma on 2L carbo/taxol (last dose 6/25/25) admitted for perforated diverticulitis. Oncology consulted for ongoing management.    #Metastatic Nasopharyngeal SCC (LN, lung mets)  -Onc Hx: Diagnosed 8/2024 on 9/10/2024 cervical LN biopsy showing SCC, EBV neg initially CPS 20%. Received induction gem/cis x6 cycles then definitive cisplatin+RT. POD 4/2025. On cervical LN rebiopsy 4/10/25 LN biopsy showing SCC, now CPS <1%. T4N3. Had new mets on toripalamab, now on 2L carbo/taxol for metastatic disease.   -PET/CT 6/10/26 with FDG avid disease seen in retropharyngeal region, supraclavicular region, skin/subcutaneous nodes, mediastinal nodes, C3/C4 intervertebral foramina, lung nodules  Treatment hx:  	- 9/26/24-11/22/24 induction gem/cis x6 cycles  	- 12/2024-4/2025 definitive cisplatin+RT until POD 4/2025  	- 4/18/25-6/3/25 toripalimab x4 cycles until POD 6/10/25  	- 6/17/25- present: carbo/taxol ACU2 and taxol 50 mg/m2 IV weekly for planned 12 cycles.  -Last received carbo/taxol C2D1 on 6/24/25.     #Perforated Diverticulitis  -ANC 6.39, WBC 5.17, Hb 8.2, Plt 124  -6/28/26 CT C/A/P  Complex multi spatial large infiltrating right neck tumor. Necrotic   areas of the tumor noted. Superimposed infection and abscess can not be excluded.  Increase in right anterolateral and lower facial soft tissue edema which may be secondary to a superimposed cellulitis.Findings consistent with perforated acute diverticulitis.Bladder wall thickening either reactive or secondary to cystitis.    Recommend:  - No plans for inpatient chemotherapy  - Appreciate surgery and SICU recs  - Empiric antibiotics for perforated diverticulitis: Cipro/Flagyl  - Daily CBC with diff, transfuse for Hb>7   - Emailed update to Dr Araujo. Outpatient follow up with him when stable for discharge.    Note not finalized until signed by attending.   Renee Mendez MD PGY-4  Hematology/Oncology Fellow  Available on MS TEAMS  Pagers: PIETER [phone]; [facility] [phone]    **For any questions after 5pm or on weekends please check PIETER on call or [facility] amion schedule for heme/onc fellow on call.  75M with remote smoking history, recurrent/metastatic nasopharyngeal carcinoma on 2L carbo/taxol (last dose 6/25/25) admitted for perforated diverticulitis. Oncology consulted for ongoing management.    #Metastatic Nasopharyngeal SCC (LN, lung mets)  -Onc Hx: Diagnosed 8/2024 on 9/10/2024 cervical LN biopsy showing SCC, EBV neg initially CPS 20%. Received induction gem/cis x6 cycles then definitive cisplatin+RT. POD 4/2025. On cervical LN rebiopsy 4/10/25 LN biopsy showing SCC, now CPS <1%. T4N3. Had new mets on toripalamab, now on 2L carbo/taxol for metastatic disease.   -PET/CT 6/10/26 with FDG avid disease seen in retropharyngeal region, supraclavicular region, skin/subcutaneous nodes, mediastinal nodes, C3/C4 intervertebral foramina, lung nodules  Treatment hx:  	- 9/26/24-11/22/24 induction gem/cis x6 cycles  	- 12/2024-4/2025 definitive cisplatin+RT until POD 4/2025  	- 4/18/25-6/3/25 toripalimab x4 cycles until POD 6/10/25  	- 6/17/25- present: carbo/taxol AUC 2 and taxol 50 mg/m2 IV weekly for planned 12 cycles.  -Last received carbo/taxol C2D1 on 6/24/25.     #Perforated Diverticulitis  -ANC 6.39, WBC 5.17, Hb 8.2, Plt 124  -6/28/26 CT C/A/P  Complex multi spatial large infiltrating right neck tumor. Necrotic   areas of the tumor noted. Superimposed infection and abscess can not be excluded.  Increase in right anterolateral and lower facial soft tissue edema which may be secondary to a superimposed cellulitis.Findings consistent with perforated acute diverticulitis.Bladder wall thickening either reactive or secondary to cystitis.    Recommend:  - No plans for inpatient chemotherapy  - Appreciate surgery and SICU recs  - Empiric antibiotics for perforated diverticulitis: Cipro/Flagyl  - Daily CBC with diff, transfuse for Hb>7   - Emailed update to Dr Araujo. Outpatient follow up with him when stable for discharge.    Note not finalized until signed by attending.   Renee Mendez MD PGY-4  Hematology/Oncology Fellow  Available on MS TEAMS  Pagers: [facility] [phone]; [facility] [phone]    **For any questions after 5pm or on weekends please check LI on call or [facility] amion schedule for heme/onc fellow on call.  75M with remote smoking history, recurrent/metastatic nasopharyngeal carcinoma on 2L carbo/taxol (last dose 6/25/25) admitted for perforated diverticulitis. Oncology consulted for ongoing management.    #Metastatic Nasopharyngeal SCC (LN, lung mets)  -Onc Hx: Diagnosed 8/2024 on 9/10/2024 cervical LN biopsy showing SCC, EBV neg initially CPS 20%. Received induction gem/cis x6 cycles then definitive cisplatin+RT. POD 4/2025. On cervical LN rebiopsy 4/10/25 LN biopsy showing SCC, now CPS <1%. T4N3. Had new mets on toripalamab, now on 2L carbo/taxol for metastatic disease.   -PET/CT 6/10/26 with FDG avid disease seen in retropharyngeal region, supraclavicular region, skin/subcutaneous nodes, mediastinal nodes, C3/C4 intervertebral foramina, lung nodules  Treatment hx:  	- 9/26/24-11/22/24 induction gem/cis x6 cycles  	- 12/2024-4/2025 definitive cisplatin+RT until POD 4/2025  	- 4/18/25-6/3/25 toripalimab x4 cycles until POD 6/10/25  	- 6/17/25- present: carbo/taxol AUC 2 and taxol 50 mg/m2 IV weekly for planned 12 cycles.  -Last received carbo/taxol C2D1 on 6/24/25.     #Perforated Diverticulitis, possible neck necrotic tumor superinfection  -ANC 6.39, WBC 5.17, Hb 8.2, Plt 124  -6/28/26 CT C/A/P  Complex multi spatial large infiltrating right neck tumor. Necrotic areas of the tumor noted. Superimposed infection and abscess can not be excluded.  Increase in right anterolateral and lower facial soft tissue edema which may be secondary to a superimposed cellulitis. Findings consistent with perforated acute diverticulitis. Bladder wall thickening either reactive or secondary to cystitis.    Recommend:  - No plans for inpatient chemotherapy  - Appreciate surgery and SICU recs: currently planned for medical management  - Empiric antibiotics for perforated diverticulitis: Cipro/Flagyl  - Daily CBC with diff, transfuse for Hb>7   - Emailed update to Dr Araujo. Outpatient follow up with him when stable for discharge.    Seen and discussed with attending Dr Daly,  Renee Mendez MD PGY-4  Hematology/Oncology Fellow  Available on MS TEAMS  Pagers: PIETER [phone]; [facility] [phone]    **For any questions after 5pm or on weekends please check PIETER on call or [facility] amion schedule for heme/onc fellow on call.  75M with remote smoking history, recurrent/metastatic nasopharyngeal carcinoma on 2L carbo/taxol (last dose 6/24/25) admitted for perforated diverticulitis. Oncology consulted for ongoing management.    #Metastatic Nasopharyngeal SCC (LN, lung mets)  -Onc Hx: Diagnosed 8/2024 on 9/10/2024 cervical LN biopsy showing SCC, EBV neg initially CPS 20%. Received induction gem/cis x6 cycles then definitive cisplatin+RT. POD 4/2025. On cervical LN rebiopsy 4/10/25 LN biopsy showing SCC, now CPS <1%. T4N3. Had new mets on toripalamab, now on 2L carbo/taxol for metastatic disease.   -PET/CT 6/10/26 with FDG avid disease seen in retropharyngeal region, supraclavicular region, skin/subcutaneous nodes, mediastinal nodes, C3/C4 intervertebral foramina, lung nodules  Treatment hx:  	- 9/26/24-11/22/24 induction gem/cis x6 cycles  	- 12/2024-4/2025 definitive cisplatin+RT until POD 4/2025  	- 4/18/25-6/3/25 toripalimab x4 cycles until POD 6/10/25  	- 6/17/25- present: carbo/taxol AUC 2 and taxol 50 mg/m2 IV weekly for planned 12 cycles.  -Last received carbo/taxol C2D1 on 6/24/25.     #Perforated Diverticulitis, possible neck necrotic tumor superinfection  -ANC 6.39, WBC 5.17, Hb 8.2, Plt 124  -6/28/26 CT C/A/P  Complex multi spatial large infiltrating right neck tumor. Necrotic areas of the tumor noted. Superimposed infection and abscess can not be excluded.  Increase in right anterolateral and lower facial soft tissue edema which may be secondary to a superimposed cellulitis. Findings consistent with perforated acute diverticulitis. Bladder wall thickening either reactive or secondary to cystitis.    Recommend:  - No plans for inpatient chemotherapy  - Appreciate surgery and SICU recs: currently planned for medical management  - Empiric antibiotics for perforated diverticulitis: Cipro/Flagyl  - Daily CBC with diff, transfuse for Hb>7   - Emailed update to Dr Araujo. Outpatient follow up with him when stable for discharge.    Seen and discussed with attending Dr Daly,  Renee Mendez MD PGY-4  Hematology/Oncology Fellow  Available on MS TEAMS  Pagers: PIETER [phone]; [facility] [phone]    **For any questions after 5pm or on weekends please check PIETER on call or [facility] amion schedule for heme/onc fellow on call.

## 2025-06-28 NOTE — CONSULT NOTE ADULT - SUBJECTIVE AND OBJECTIVE BOX
OTOLARYNGOLOGY (ENT) CONSULTATION NOTE    PATIENT: KIRSTEN NY     MRN: [id number]       : 54  DATE OF ADMISSION:25  DATE OF SERVICE:  25 @ 14:47    HISTORY OF PRESENT ILLNESS:  KIRSTEN NY  is a 75y with remote smoking history, recurrent/metastatic nasopharyngeal carcinoma on 2L carbo/taxol (last dose 25) admitted for perforated diverticulitis.    PAST MEDICAL HISTORY:  BPH with elevated PSA    Osteoarthritis    Hyperlipidemia    Anxiety    Cancer of sinus    Malignant neoplasm of nasopharynx    History of chemotherapy    S/P radiation therapy        CURRENT MEDICATIONS   ciprofloxacin   IVPB    ciprofloxacin   IVPB 400 IV Intermittent  enoxaparin Injectable 40 SubCutaneous  lactated ringers. 1000 IV Continuous  metroNIDAZOLE  IVPB 500 IV Intermittent  metroNIDAZOLE  IVPB    silver sulfADIAZINE 1% Cream 1 Topical      HOME MEDICATIONS:  atorvastatin 40 mg oral tablet  finasteride 5 mg oral tablet  magic mouth wash  OMEPRAZOLE   CAP 40MG  Reglan 10 mg oral tablet  tamsulosin 0.4 mg oral capsule  Tylenol 500 mg oral tablet      ALLERGIES:  No Known Allergies    SOCIAL HISTORY: Pertinent included in HPI   FAMILY HISTORY: Pertinent included in HPI   Family hx of colon cancer (Aunt)        SURGICAL HISTORY: Pertinent included in HPI   History of knee replacement procedure of right knee    S/P total knee replacement, left    S/P cataract surgery    H/O colonoscopy    H/O cervical biopsy    History of incisional hernia repair        PHYSICAL EXAM:  ENT EXAM-   Constitutional: NAD.  No hoarseness.     Head:  normocephalic, atraumatic.   Ears:  Ear canals both clear.  Nose:  Septum intact, midline, deviated.  Inferior turbinates normal bilateral  OC/OP:   Floor of mouth, buccal mucosa, lips, hard palate, soft palate, uvula, posterior pharyngeal wall normal. Edentulous on upper teeth. Mucosa mildly dried  Neck:  Trachea midline. Thyroid, parotid and submandibular glands normal. Right lateral neck with radiation and bulky tumor burden.     Vital Signs:  T(C): 37.2 (25 @ 12:00), Max: 38.2 (25 @ 21:52)  HR: 97 (25 @ 14:00) (87 - 171)  BP: 106/73 (25 @ 14:00) (106/73 - 148/68)  RR: 19 (25 @ 14:00) (14 - 22)  SpO2: 97% (25 @ 14:00) (93% - 100%)                        8.2    5.17  )-----------( 124      ( 2025 07:40 )             26.9        132[L]  |  97[L]  |  14  ----------------------------<  152[H]  3.8   |  25  |  0.81    Ca    8.3[L]      2025 09:17  Phos  2.7       Mg     1.70         TPro  6.9  /  Alb  3.3  /  TBili  0.6  /  DBili  x   /  AST  17  /  ALT  14  /  AlkPhos  73     PT/INR - ( 2025 23:20 )   PT: 12.8 sec;   INR: 1.08 ratio         PTT - ( 2025 23:20 )  PTT:23.5 [id number]    MICROBIOLOGY:    Urinalysis with Rflx Culture (collected 25 @ 01:42)    Culture - Blood (collected 25 @ 23:30)  Source: Blood Blood-Peripheral  Gram Stain (25 @ 13:59):    Growth in anaerobic bottle: Gram Negative Rods  Preliminary Report (25 @ 13:59):    Growth in anaerobic bottle: Gram Negative Rods    Direct identification is available within approximately 3-5    hours either by Blood Panel Multiplexed PCR or Direct    MALDI-TOF. Details: [email or web address]

## 2025-06-28 NOTE — H&P ADULT - ATTENDING COMMENTS
DATE OF SERVICE: 06-28-25 @ 12:18    75M with nasopharyngeal carcinoma on chemo, last dose this week, here with abdominal pain since last chemo dose. Located in lower abdomen and LLQ. Never had this before. Had been tolerating tube feeds as normal  Tm 100.7 in ED, SVT on arrival, improved with betablocker  WBC NL, mild hyponatremia  CT scan reviewed (pending official final read) - sigmoid diverticulitis with phlegmonous changes, air in RP around R kidney, small volume of air adjacent to posterior liver, no large volume pneumoperitoneum or free fluid    Abd soft, + LLQ/suprapubic ttp, no rebound    SICU admission given persistent HR, possibly in the setting of infection and chemotherapy  Will attempt non-operative management with IV abx given recent chemotherapy and ca, should this fail he will require surgical exploration, I explained this to the pt and the family at bedside today  IVF resuscitation  heme-onc consult, ENT team, consider cardiac eval if continued symptoms  Serial exams, close monitoring

## 2025-06-28 NOTE — ED PROVIDER NOTE - PROGRESS NOTE DETAILS
Amol PGY-2: surg consulted for acute perforated divertic. Notified by RN that patient was back in SVT, with heart rate in the 150s.  Blood pressure 115/72.  Patient mentating appropriately, not complaining of chest pain, shortness of breath or palpitations.  Attempted vagal maneuver x 2 without improvement in heart rate.  Ordered 2.5 of Lopressor IV, which was given with good response.  Patient's heart rate now in the 80s, blood pressure remains 110/71.  Surgery team notified about patient's change in status.  Also notified after Lopressor was given.  Patient to be admitted to surgical service.  Also spoke with ENT, regarding concern for abscess overlying known right sided neck tumor.  ENT to look at images and follow/see patient. Amol PGY-2: patient admitted to surgical service but briefly went back into SVT with /70. mentating appropriately. lasted about 1 minute and broke without medications. called surgery to  update. patient to go to SICU. Frank: Patient endorsed to me from Dr. Quijano.  Pt is a 73 yo M with nasopharyngeal carcinoma on chemotherapy, last treatment approximately 4 days ago, BPH, osteoarthritis, hyperlipidemia a/w abdominal pain.  Pt found to have perforated diverticulitis and SVT with runs to 170s.  Pt required lopressor 2.5 mg IVP.   Given patients multiple runs of SVT, SICU was evaluating.  SICU PA at bedside.     Patient was critically ill with a high probability of imminent or life threatening deterioration.    I have performed direct patient care (not related to procedure), additional history taking, interpretation of diagnostic studies, documentation, consultation with other physicians, telephone consultation with the patient's family    I have personally and independently provided the amount of critical care time documented below excluding time spent on separate procedures: 35mins.

## 2025-06-28 NOTE — H&P ADULT - NSHPLABSRESULTS_GEN_ALL_CORE
11.2   6.88  )-----------( 195      ( 2025 23:20 )             34.1         131[L]  |  93[L]  |  18  ----------------------------<  187[H]  4.3   |  23  |  0.96    Ca    9.1      2025 23:20    TPro  6.9  /  Alb  3.3  /  TBili  0.6  /  DBili  x   /  AST  17  /  ALT  14  /  AlkPhos  73      PT/INR - ( 2025 23:20 )   PT: 12.8 sec;   INR: 1.08 ratio         PTT - ( 2025 23:20 )  PTT:23.5 sec  Urinalysis Basic - ( 2025 01:42 )    Color: Yellow / Appearance: Cloudy / S.018 / pH: x  Gluc: x / Ketone: x  / Bili: Negative / Urobili: 0.2 mg/dL   Blood: x / Protein: 30 mg/dL / Nitrite: Negative   Leuk Esterase: Negative / RBC: 7 /HPF / WBC 0 /HPF   Sq Epi: x / Non Sq Epi: 0 /HPF / Bacteria: Negative /HPF    < from: CT Abdomen and Pelvis w/ IV Cont (25 @ 01:26) >    FINDINGS:  Tubes, catheters and devices: G-tube in position.    Liver: Normal. No mass.  Gallbladder and biliary ducts: Normal. No calcified stones.No ductal   dilation.  Pancreas: Normal. No ductal dilation.  Spleen: Normal. No splenomegaly.  Adrenal glands: Normal. No mass.  Kidneys and ureters: Left kidney subcentimeter cysts. Right kidney   intact. No  hydronephrosis or stones.  Stomach and bowel: Multiple diverticuli of the colon. Inflammation of the  sigmoid colon with wall thickening and surrounding inflammatory changes.  Appendix: The appendix is not seen.    Intraperitoneal space: Numerous bubbles of free air in the left lower   quadrant.  Free fluid in the left lower quadrant. No evidence of abscess.  Vasculature: Atherosclerosis.  Lymph nodes: Unremarkable. No enlarged lymph nodes.  Urinary bladder: Slight bladder wall thickening.  Reproductive: Unremarkable as visualized.  Bones/joints: DJD.  Soft tissues: Air is seen dissecting into the retroperitoneal space and   into  the right perinephric soft tissues.    IMPRESSION:  1.   Findings consistent with perforated acute diverticulitis.  2.   Bladder wall thickening either reactive or secondary to cystitis.    < end of copied text >

## 2025-06-28 NOTE — CONSULT NOTE ADULT - SUBJECTIVE AND OBJECTIVE BOX
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 He reports the symptoms began on Tuesday while at chemotherapy with crampy abdominal pain associated with nausea. He has been otherwise tolerating PEG tube feeds. Denies fevers at home. No diarrhea. Never had similar episodes. Had a colonoscopy 5 years ago which showed benign polyps.     In the ED, patient was noted to be febrile to 100.7 and in SVT with HR in 170s. Given beta blockers, antibiotics and IVF resuscitation with improvement, and SVT returned vagal maneuvers attempted and SVT improved.    HISTORY  75y Male  Allergies: No Known Allergies    PAST MEDICAL & SURGICAL HISTORY:  BPH with elevated PSA  Osteoarthritis  Hyperlipidemia  Anxiety  Cancer of sinus  Malignant neoplasm of nasopharynx  History of chemotherapy  S/P radiation therapy  History of knee replacement procedure of right knee  S/P total knee replacement, left  S/P cataract surge  H/O colonoscopy  H/O cervical biopsy  History of incisional hernia repair    FAMILY HISTORY:  Family hx of colon cancer (Aunt)    SOCIAL HISTORY:    ADVANCE DIRECTIVES: Presumed Full Code    REVIEW OF SYSTEMS:    General: Non-Contributory  Skin/Breast: Non-Contributory  Ophthalmologic: Non-Contributory  ENMT: Non-Contributory  Respiratory and Thorax: Non-Contributory  Cardiovascular: Non-Contributory  Gastrointestinal: Non-Contributory  Genitourinary: Non-Contributory  Musculoskeletal: Non-Contributory  Neurological: Non-Contributory  Psychiatric: Non-Contributory  Hematology/Lymphatics: Non-Contributory  Endocrine: Non-Contributory  Allergic/Immunologic: Non-Contributory  HOME MEDICATIONS:   CURRENT MEDICATIONS:   --------------------------------------------------------------------------------------  Neurologic Medications    Respiratory Medications    Cardiovascular Medications    Gastrointestinal Medications  lactated ringers. 1000 milliLiter(s) IV Continuous <Continuous>    Genitourinary Medications    Hematologic/Oncologic Medications  enoxaparin Injectable 40 milliGRAM(s) SubCutaneous every 24 hours    Antimicrobial/Immunologic Medications  ciprofloxacin   IVPB      ciprofloxacin   IVPB 400 milliGRAM(s) IV Intermittent once  ciprofloxacin   IVPB 400 milliGRAM(s) IV Intermittent every 12 hours  metroNIDAZOLE  IVPB 500 milliGRAM(s) IV Intermittent once  metroNIDAZOLE  IVPB 500 milliGRAM(s) IV Intermittent every 12 hours  metroNIDAZOLE  IVPB        Endocrine/Metabolic Medications    Topical/Other Medications    --------------------------------------------------------------------------------------  VITAL SIGNS, INS/OUTS (last 24 hours):  --------------------------------------------------------------------------------------  ((Insert SICU Vitals / Is+Os here)) ***  --------------------------------------------------------------------------------------    EXAM  NEUROLOGY  RASS:   	GCS:    Exam: Normal, NAD, alert, oriented x 3, no focal deficits.   HEENT  Exam: Normocephalic, atraumatic.  EOMI   RESPIRATORY  Exam: Lungs clear to auscultation, Normal expansion/effort.    Mechanical Ventilation:   CARDIOVASCULAR  Exam: S1, S2.  Regular rate and rhythm.  Peripheral edema    GI/NUTRITION  Exam: Abdomen soft, Non-tender, Non-distended.  Gastrostomy / Jejunostomy / Nasogastric tube in place.  Colostomy / Ileostomy.    Wound:  PEG, LLQ tenderness  Current Diet:  NPO  VASCULAR  Exam: Extremities warm, pink, well-perfused.   MUSCULOSKELETAL  Exam: All extremities moving spontaneously without limitations.    SKIN:  Exam: Good skin turgor, no skin breakdown.    METABOLIC/FLUIDS/ELECTROLYTES  lactated ringers. 1000 milliLiter(s) IV Continuous <Continuous>    HEMATOLOGIC  [x] DVT Prophylaxis: enoxaparin Injectable 40 milliGRAM(s) SubCutaneous every 24 hours    Transfusions:	[] PRBC	[] Platelets		[] FFP	[] Cryoprecipitate  INFECTIOUS DISEASE  Antimicrobials/Immunologic Medications:  ciprofloxacin   IVPB      ciprofloxacin   IVPB 400 milliGRAM(s) IV Intermittent once  ciprofloxacin   IVPB 400 milliGRAM(s) IV Intermittent every 12 hours  metroNIDAZOLE  IVPB 500 milliGRAM(s) IV Intermittent once  metroNIDAZOLE  IVPB 500 milliGRAM(s) IV Intermittent every 12 hours  metroNIDAZOLE  IVPB        Day #  	of    ***  Tubes/Lines/Drains  ***  [x] Peripheral IV  [] Central Venous Line     	[] R	[] L	[] IJ	[] Fem	[] SC	Date Placed:   [] Arterial Line		[] R	[] L	[] Fem	[] Rad	[] Ax	Date Placed:   [] PICC:         	[] Midline		[] Mediport  [] Urinary Catheter		Date Placed:     LABS  --------------------------------------------------------------------------------------  ((Insert SICU Labs here))***  --------------------------------------------------------------------------------------  OTHER LABS  IMAGING RESULTS  Echo:   CT:                                                                     =====================================================                                                             SICU Consultation Note                                                             =====================================================  Patient is a 75y old  Male who presents with a chief complaint of diverticulitis (28 Jun 2025 05:04)    HPI: 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 He reports the symptoms began on Tuesday while at chemotherapy with crampy abdominal pain associated with nausea. He has been otherwise tolerating PEG tube feeds. Denies fevers at home. No diarrhea. Never had similar episodes. Had a colonoscopy 5 years ago which showed benign polyps.     In the ED, patient was noted to be febrile to 100.7 and in SVT with HR in 170s. Given beta blockers, antibiotics and IVF resuscitation with improvement, and SVT returned vagal maneuvers attempted and SVT improved.    Allergies: No Known Allergies    PAST MEDICAL & SURGICAL HISTORY:  BPH with elevated PSA  Osteoarthritis  Hyperlipidemia  Anxiety  Cancer of sinus  Malignant neoplasm of nasopharynx  History of chemotherapy  S/P radiation therapy  History of knee replacement procedure of right knee  S/P total knee replacement, left  S/P cataract surge  H/O colonoscopy  H/O cervical biopsy  History of incisional hernia repair    FAMILY HISTORY:  Family hx of colon cancer (Aunt)    SOCIAL HISTORY:    ADVANCE DIRECTIVES: Presumed Full Code    REVIEW OF SYSTEMS:    General: Non-Contributory  Skin/Breast: Non-Contributory  Ophthalmologic: Non-Contributory  ENMT: Non-Contributory  Respiratory and Thorax: Non-Contributory  Cardiovascular: Non-Contributory  Gastrointestinal: Non-Contributory  Genitourinary: Non-Contributory  Musculoskeletal: Non-Contributory  Neurological: Non-Contributory  Psychiatric: Non-Contributory  Hematology/Lymphatics: Non-Contributory  Endocrine: Non-Contributory  Allergic/Immunologic: Non-Contributory  HOME MEDICATIONS:   CURRENT MEDICATIONS:   --------------------------------------------------------------------------------------  Neurologic Medications    Respiratory Medications    Cardiovascular Medications    Gastrointestinal Medications  lactated ringers. 1000 milliLiter(s) IV Continuous <Continuous>    Genitourinary Medications    Hematologic/Oncologic Medications  enoxaparin Injectable 40 milliGRAM(s) SubCutaneous every 24 hours    Antimicrobial/Immunologic Medications  ciprofloxacin   IVPB      ciprofloxacin   IVPB 400 milliGRAM(s) IV Intermittent once  ciprofloxacin   IVPB 400 milliGRAM(s) IV Intermittent every 12 hours  metroNIDAZOLE  IVPB 500 milliGRAM(s) IV Intermittent once  metroNIDAZOLE  IVPB 500 milliGRAM(s) IV Intermittent every 12 hours  metroNIDAZOLE  IVPB        Endocrine/Metabolic Medications    Topical/Other Medications    --------------------------------------------------------------------------------------  VITAL SIGNS, INS/OUTS (last 24 hours):  --------------------------------------------------------------------------------------  ICU Vital Signs Last 24 Hrs  T(C): 36.7 (28 Jun 2025 03:18), Max: 38.2 (27 Jun 2025 21:52)  T(F): 98.1 (28 Jun 2025 03:18), Max: 100.7 (27 Jun 2025 21:52)  HR: 95 (28 Jun 2025 06:26) (87 - 171)  BP: 113/78 (28 Jun 2025 06:26) (110/71 - 126/76)  BP(mean): --  ABP: --  ABP(mean): --  RR: 16 (28 Jun 2025 06:26) (15 - 22)  SpO2: 99% (28 Jun 2025 06:26) (96% - 100%)    O2 Parameters below as of 28 Jun 2025 06:26  Patient On (Oxygen Delivery Method): room air        --------------------------------------------------------------------------------------    EXAM  NEUROLOGY  RASS:   	GCS:    Exam: Normal, NAD, alert, oriented x 3, no focal deficits.   HEENT  Exam: Normocephalic, atraumatic.  EOMI   RESPIRATORY  Exam: Lungs clear to auscultation, Normal expansion/effort.    Mechanical Ventilation:   CARDIOVASCULAR  Exam: S1, S2.  Regular rate and rhythm.  Peripheral edema    GI/NUTRITION  Exam: Abdomen soft, LLQ TTP, Non-distended.  Wound:  PEG, LLQ tenderness  Current Diet:  NPO  VASCULAR  Exam: Extremities warm, pink, well-perfused.   MUSCULOSKELETAL  Exam: All extremities moving spontaneously without limitations.    SKIN:  Exam: Good skin turgor, no skin breakdown.    METABOLIC/FLUIDS/ELECTROLYTES  lactated ringers. 1000 milliLiter(s) IV Continuous <Continuous>    HEMATOLOGIC  [x] DVT Prophylaxis: enoxaparin Injectable 40 milliGRAM(s) SubCutaneous every 24 hours    Transfusions:	[] PRBC	[] Platelets		[] FFP	[] Cryoprecipitate  INFECTIOUS DISEASE  Antimicrobials/Immunologic Medications:  ciprofloxacin   IVPB      ciprofloxacin   IVPB 400 milliGRAM(s) IV Intermittent once  ciprofloxacin   IVPB 400 milliGRAM(s) IV Intermittent every 12 hours  metroNIDAZOLE  IVPB 500 milliGRAM(s) IV Intermittent once  metroNIDAZOLE  IVPB 500 milliGRAM(s) IV Intermittent every 12 hours  metroNIDAZOLE  IVPB        Day #  	of    ***  Tubes/Lines/Drains  ***  [x] Peripheral IV  [] Central Venous Line     	[] R	[] L	[] IJ	[] Fem	[] SC	Date Placed:   [] Arterial Line		[] R	[] L	[] Fem	[] Rad	[] Ax	Date Placed:   [] PICC:         	[] Midline		[] Mediport  [] Urinary Catheter		Date Placed:     LABS  --------------------------------------------------------------------------------------  ((Insert [facility] Labs here))***  --------------------------------------------------------------------------------------  OTHER LABS  IMAGING RESULTS  Echo:   CT:                                                                     =====================================================                                                             SICU Consultation Note                                                             =====================================================  Patient is a 75y old  Male who presents with a chief complaint of diverticulitis (28 Jun 2025 05:04)    HPI: 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 He reports the symptoms began on Tuesday while at chemotherapy with crampy abdominal pain associated with nausea. He has been otherwise tolerating PEG tube feeds. Denies fevers at home. No diarrhea. Never had similar episodes. Had a colonoscopy 5 years ago which showed benign polyps.     In the ED, patient was noted to be febrile to 100.7 and in SVT with HR in 170s. Given beta blockers, antibiotics and IVF resuscitation with improvement, and SVT returned vagal maneuvers attempted and SVT improved.    Allergies: No Known Allergies    PAST MEDICAL & SURGICAL HISTORY:  BPH with elevated PSA  Osteoarthritis  Hyperlipidemia  Anxiety  Cancer of sinus  Malignant neoplasm of nasopharynx  History of chemotherapy  S/P radiation therapy  History of knee replacement procedure of right knee  S/P total knee replacement, left  S/P cataract surge  H/O colonoscopy  H/O cervical biopsy  History of incisional hernia repair    FAMILY HISTORY:  Family hx of colon cancer (Aunt)    SOCIAL HISTORY:    ADVANCE DIRECTIVES: Presumed Full Code    REVIEW OF SYSTEMS:    General: Non-Contributory  Skin/Breast: Non-Contributory  Ophthalmologic: Non-Contributory  ENMT: Non-Contributory  Respiratory and Thorax: Non-Contributory  Cardiovascular: Non-Contributory  Gastrointestinal: Non-Contributory  Genitourinary: Non-Contributory  Musculoskeletal: Non-Contributory  Neurological: Non-Contributory  Psychiatric: Non-Contributory  Hematology/Lymphatics: Non-Contributory  Endocrine: Non-Contributory  Allergic/Immunologic: Non-Contributory  HOME MEDICATIONS:   CURRENT MEDICATIONS:   --------------------------------------------------------------------------------------  Neurologic Medications    Respiratory Medications    Cardiovascular Medications    Gastrointestinal Medications  lactated ringers. 1000 milliLiter(s) IV Continuous <Continuous>    Genitourinary Medications    Hematologic/Oncologic Medications  enoxaparin Injectable 40 milliGRAM(s) SubCutaneous every 24 hours    Antimicrobial/Immunologic Medications  ciprofloxacin   IVPB      ciprofloxacin   IVPB 400 milliGRAM(s) IV Intermittent once  ciprofloxacin   IVPB 400 milliGRAM(s) IV Intermittent every 12 hours  metroNIDAZOLE  IVPB 500 milliGRAM(s) IV Intermittent once  metroNIDAZOLE  IVPB 500 milliGRAM(s) IV Intermittent every 12 hours  metroNIDAZOLE  IVPB        Endocrine/Metabolic Medications    Topical/Other Medications    --------------------------------------------------------------------------------------  VITAL SIGNS, INS/OUTS (last 24 hours):  --------------------------------------------------------------------------------------  ICU Vital Signs Last 24 Hrs  T(C): 36.7 (28 Jun 2025 03:18), Max: 38.2 (27 Jun 2025 21:52)  T(F): 98.1 (28 Jun 2025 03:18), Max: 100.7 (27 Jun 2025 21:52)  HR: 95 (28 Jun 2025 06:26) (87 - 171)  BP: 113/78 (28 Jun 2025 06:26) (110/71 - 126/76)  BP(mean): --  ABP: --  ABP(mean): --  RR: 16 (28 Jun 2025 06:26) (15 - 22)  SpO2: 99% (28 Jun 2025 06:26) (96% - 100%)    O2 Parameters below as of 28 Jun 2025 06:26  Patient On (Oxygen Delivery Method): room air        --------------------------------------------------------------------------------------    EXAM  NEUROLOGY  Exam: Normal, NAD, alert, oriented x 3, no focal deficits.   HEENT  Exam: Normocephalic, atraumatic.  EOMI, right now large erythematous mass  RESPIRATORY  Exam: Lungs clear to auscultation, Normal expansion/effort.    Mechanical Ventilation:   CARDIOVASCULAR  Exam: S1, S2.  Regular rate and rhythm.  No Peripheral edema    GI/NUTRITION  Exam: Abdomen soft, LLQ TTP, Non-distended.  Wound:  PEG, LLQ tenderness  Current Diet:  NPO  VASCULAR  Exam: Extremities warm, pink, well-perfused.   MUSCULOSKELETAL  Exam: All extremities moving spontaneously without limitations.    SKIN:  Exam: Good skin turgor, no skin breakdown.    METABOLIC/FLUIDS/ELECTROLYTES  lactated ringers. 1000 milliLiter(s) IV Continuous <Continuous>    HEMATOLOGIC  [x] DVT Prophylaxis: enoxaparin Injectable 40 milliGRAM(s) SubCutaneous every 24 hours    Transfusions:	[] PRBC	[] Platelets		[] FFP	[] Cryoprecipitate  INFECTIOUS DISEASE  Antimicrobials/Immunologic Medications:  ciprofloxacin   IVPB      ciprofloxacin   IVPB 400 milliGRAM(s) IV Intermittent once  ciprofloxacin   IVPB 400 milliGRAM(s) IV Intermittent every 12 hours  metroNIDAZOLE  IVPB 500 milliGRAM(s) IV Intermittent once  metroNIDAZOLE  IVPB 500 milliGRAM(s) IV Intermittent every 12 hours  metroNIDAZOLE  IVPB        Day #  	of    ***  Tubes/Lines/Drains  ***  [x] Peripheral IV  [] Central Venous Line     	[] R	[] L	[] IJ	[] Fem	[] SC	Date Placed:   [] Arterial Line		[] R	[] L	[] Fem	[] Rad	[] Ax	Date Placed:   [] PICC:         	[] Midline		[] Mediport  [] Urinary Catheter		Date Placed:     LABS  --------------------------------------------------------------------------------------  ((Insert SICU Labs here))***  --------------------------------------------------------------------------------------  OTHER LABS  IMAGING RESULTS  Echo:   CT: < from: CT Neck Soft Tissue w/ IV Cont (06.28.25 @ 01:26) >  IMPRESSION:  1.   Complex multi spatial large infiltrating right neck tumor. Necrotic   areas  of the tumor noted. Superimposed infection and abscess can not be   excluded.  Increase in right anterolateral and lower facial soft tissue edema which   may be  secondary to a superimposed cellulitis.  2.   Stable appearance of the slightly thickened right side of the   epiglottis.  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 Other findings as   noted.  < end of copied text >    FINDINGS:  Tubes, catheters and devices: G-tube in position.    Liver: Normal. No mass.  Gallbladder and biliary ducts: Normal. No calcified stones. No ductal   dilation.  Pancreas: Normal. No ductal dilation.  Spleen: Normal. No splenomegaly.  Adrenal glands: Normal. No mass.  Kidneys and ureters: Left kidney subcentimeter cysts. Right kidney   intact. No  hydronephrosis or stones.  Stomach and bowel: Multiple diverticuli of the colon. Inflammation of the  sigmoid colon with wall thickening and surrounding inflammatory changes.  Appendix: The appendix is not seen.    Intraperitoneal space: Numerous bubbles of free air in the left lower   quadrant.  Free fluid in the left lower quadrant. No evidence of abscess.  Vasculature: Atherosclerosis.  Lymph nodes: Unremarkable. No enlarged lymph nodes.  Urinary bladder: Slight bladder wall thickening.  Reproductive: Unremarkable as visualized.  Bones/joints: DJD.  Soft tissues: Air is seen dissecting into the retroperitoneal space and   into  the right perinephric soft tissues.    IMPRESSION:  1.   Findings consistent with perforated acute diverticulitis.  2.   Bladder wall thickening either reactive or secondary to cystitis.

## 2025-06-28 NOTE — ED ADULT NURSE REASSESSMENT NOTE - NS ED NURSE REASSESS COMMENT FT1
pt endorsing nausea, MD Marie made aware. surgery resident at bedside for pt eval. medicated per MAR

## 2025-06-28 NOTE — CONSULT NOTE ADULT - ASSESSMENT
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    NEUROLOGIC   - No standing pain meds, PRN  - A/Ox3    RESPIRATORY   - Monitor SpO2 goal >92%  - Incentive spirometry     CARDIOVASCULAR   - Monitor hemodynamics   - SVT HR 170s s/p lopressor 2.5 mg x 1 and 2nd episode broke with vagal maneuvers    GASTROINTESTINAL   - Diet: NPO  - Katefarms TF via PEG at home    /RENAL   - IV fluids: LR @ 125cc/hr  - Strict I/Os  - Monitor BMP qd  - Maintain fraser catheter, strict Is/Os  - Monitor electrolytes, replete PRN    HEMATOLOGIC  - Monitor H/H   - DVT ppx: Lovenox/SQH & SCD's    INFECTIOUS DISEASE  - Monitor fever / WBC  - Cipro and Flagyl    ENDOCRINE  - Monitor gluc    LINES  - PIV     DISPO: SICU  --------------------------------------------------------------------------------------    Critical Care Diagnoses: ASSESSMENT:  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    NEUROLOGIC   - No standing pain meds, PRN for abdominal pain post exam  - A/Ox3    RESPIRATORY   - Monitor SpO2 goal >92%  - Incentive spirometry     CARDIOVASCULAR   - Monitor hemodynamics   - SVT HR 170s s/p lopressor 2.5 mg x 1 and 2nd episode broke with vagal maneuvers    GASTROINTESTINAL   - Diet: NPO  - Katefarms TF via PEG at home, last feeds was yesterday for lunch    /RENAL   - IV fluids: LR @ 125cc/hr  - Strict I/Os  - Monitor BMP qd  - Maintain fraser catheter, strict Is/Os  - Monitor electrolytes, replete PRN    HEMATOLOGIC  - Monitor H/H   - DVT ppx: Lovenox/SQH & SCD's  - New chemo started on 6/24 Paclitaxel and Carboplatin    INFECTIOUS DISEASE  - Monitor fever / WBC  - Cipro and Flagyl    ENDOCRINE  - Monitor gluc    LINES  - PIV     DISPO: SICU  --------------------------------------------------------------------------------------    Critical Care Diagnoses:

## 2025-06-28 NOTE — ED PROVIDER NOTE - CLINICAL SUMMARY MEDICAL DECISION MAKING FREE TEXT BOX
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 With diffuse abdominal tenderness, EKG with SVT, febrile concern for intraabdominal etiology for symptoms, though also taking water by  mouth and with coarse breath sounds therefore concern for aspiration.

## 2025-06-28 NOTE — ED PROVIDER NOTE - DATE/TIME 1
Operative Note      Postoperative Note    Clarissa Holt  YOB: 1962  [id number]    Pre-operative Diagnosis: Papilloma of left breast [D24.2]    Post-operative Diagnosis: Same    Procedure: left localized excisional breast biopsy    Anesthesia: General     Surgeons/Assistants: Rafia Fang  Assistant: Danielle Willson    [facility]  28-Jun-2025 03:47

## 2025-06-28 NOTE — H&P ADULT - NSHPPHYSICALEXAM_GEN_ALL_CORE
General: alert and oriented, NAD  Neck: mass noted on right neck  Resp: airway patent, respirations unlabored  CVS: regular rate and rhythm  Abdomen: soft, PEG in place, tender to palpation in LLQ  Extremities: no edema  Skin: warm, dry, appropriate color

## 2025-06-28 NOTE — ED PROVIDER NOTE - IV ALTEPLASE DOOR HIDDEN
S/p compassionate extubation in the PCU 2/16/20.    No PRNs in the past 24 hours.   Morphine drip 2 mg/hour and prn doses remain at 4 mg. show

## 2025-06-28 NOTE — H&P ADULT - HISTORY OF PRESENT ILLNESS
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 He reports the symptoms began on Tuesday while at chemotherapy with crampy abdominal pain associated with nausea. He has been otherwise tolerating PEG tube feeds. Denies fevers at home. No diarrhea. Never had similar episodes. Had a colonoscopy 5 years ago which showed benign polyps.     In the ED, patient was noted to be febrile to 100.7 and in SVT with HR in 170s. Given beta blockers, antibiotics and IVF resuscitation with improvement

## 2025-06-28 NOTE — ED ADULT NURSE REASSESSMENT NOTE - NS ED NURSE REASSESS COMMENT FT1
pt HR noted to be in the 150s, denies chest pain/tightness, SOB. MD Marie and MD tavares aware and at bedside. pt medicated, HR return to NSR. remains on zoll

## 2025-06-28 NOTE — H&P ADULT - ASSESSMENT
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

## 2025-06-28 NOTE — ED PROVIDER NOTE - PHYSICAL EXAMINATION
Gen: non toxic appearing, but weak   Head: normal appearing  HEENT: normal conjunctiva, oral mucosa moist, vision grossly intact   Lung: no respiratory distress, speaking in full sentences, CTA b/l, no wheeze, crackles or rhonchi   CV: tachycardic rate, normal rhythm, no murmurs  Abd: soft, PEG tube to L lower abdomen, diffuse severe tenderness, + guarding   MSK: no visible deformities, ambulating without difficulty   Neuro: No focal deficits, AAOx3  Skin: Warm, no rashes   Psych: normal affect

## 2025-06-28 NOTE — ED PROVIDER NOTE - OBJECTIVE STATEMENT
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  Patient states symptoms began at his second chemotherapy session on Tuesday.  Initially was a knot in the center of his stomach but has been progressively worsening.  Associated nausea but no vomiting.  Initially thought his symptoms were a side effect of his chemotherapy as this is new treatment for him.  Has a PEG tube, for which he has feeds every 4-6 hours.  Does still take water by mouth.  Denies measured fevers, chest pain, shortness of breath, palpitations, diarrhea, bloody stools.

## 2025-06-28 NOTE — ED ADULT NURSE REASSESSMENT NOTE - NS ED NURSE REASSESS COMMENT FT1
pt noted to be tachy to 140s on cardiac monitor, vagal maneuvers performed pt broke, now in sinus rhythm. surgery team called and made aware

## 2025-06-28 NOTE — PATIENT PROFILE ADULT - FALL HARM RISK - HARM RISK INTERVENTIONS

## 2025-06-28 NOTE — CONSULT NOTE ADULT - SUBJECTIVE AND OBJECTIVE BOX
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 He reports the symptoms began on Tuesday while at chemotherapy with crampy abdominal pain associated with nausea. He has been otherwise tolerating PEG tube feeds. Denies fevers at home. No diarrhea. Never had similar episodes. Had a colonoscopy 5 years ago which showed benign polyps.     In the ED, patient was noted to be febrile to 100.7 and in SVT with HR in 170s. Given beta blockers, antibiotics and IVF resuscitation with improvement (28 Jun 2025 05:04)       PAST MEDICAL & SURGICAL HISTORY:  BPH with elevated PSA      Osteoarthritis      Hyperlipidemia      Anxiety      Cancer of sinus      Malignant neoplasm of nasopharynx      History of chemotherapy      S/P radiation therapy      History of knee replacement procedure of right knee      S/P total knee replacement, left      S/P cataract surgery      H/O colonoscopy      H/O cervical biopsy      History of incisional hernia repair          FAMILY HISTORY:  Family hx of colon cancer (Aunt)        MEDICATIONS  (STANDING):  ciprofloxacin   IVPB      ciprofloxacin   IVPB 400 milliGRAM(s) IV Intermittent every 12 hours  enoxaparin Injectable 40 milliGRAM(s) SubCutaneous every 24 hours  lactated ringers. 1000 milliLiter(s) (100 mL/Hr) IV Continuous <Continuous>  metroNIDAZOLE  IVPB 500 milliGRAM(s) IV Intermittent every 12 hours  metroNIDAZOLE  IVPB      silver sulfADIAZINE 1% Cream 1 Application(s) Topical daily    MEDICATIONS  (PRN):      Allergies    No Known Allergies    Intolerances        Vital Signs Last 24 Hrs  T(C): 37.2 (28 Jun 2025 12:00), Max: 38.2 (27 Jun 2025 21:52)  T(F): 98.9 (28 Jun 2025 12:00), Max: 100.7 (27 Jun 2025 21:52)  HR: 94 (28 Jun 2025 12:00) (87 - 171)  BP: 111/71 (28 Jun 2025 12:00) (110/71 - 148/68)  BP(mean): 82 (28 Jun 2025 12:00) (78 - 97)  RR: 14 (28 Jun 2025 12:00) (14 - 22)  SpO2: 94% (28 Jun 2025 12:00) (93% - 100%)    Parameters below as of 28 Jun 2025 12:00  Patient On (Oxygen Delivery Method): room air        PHYSICAL EXAM  General: adult in NAD  HEENT: clear oropharynx, anicteric sclera, pink conjunctiva  Neck: supple  CV: normal S1/S2 with no murmur rubs or gallops  Lungs: positive air movement b/l ant lungs,clear to auscultation, no wheezes, no rales  Abdomen: soft non-tender non-distended, no hepatosplenomegaly  Ext: no clubbing cyanosis or edema  Skin: no rashes and no petechiae  Neuro: alert and oriented X 4, no focal deficits      06-27-25 @ 07:01  -  06-28-25 @ 07:00  --------------------------------------------------------  IN: 100 mL / OUT: 0 mL / NET: 100 mL    06-28-25 @ 07:01  -  06-28-25 @ 12:24  --------------------------------------------------------  IN: 500 mL / OUT: 400 mL / NET: 100 mL      LABS:                          8.2    5.17  )-----------( 124      ( 28 Jun 2025 07:40 )             26.9         Mean Cell Volume : 106.3 fl  Mean Cell Hemoglobin : 32.4 pg  Mean Cell Hemoglobin Concentration : 30.5 g/dL  Auto Neutrophil # : x  Auto Lymphocyte # : x  Auto Monocyte # : x  Auto Eosinophil # : x  Auto Basophil # : x  Auto Neutrophil % : x  Auto Lymphocyte % : x  Auto Monocyte % : x  Auto Eosinophil % : x  Auto Basophil % : x      06-28    132[L]  |  97[L]  |  14  ----------------------------<  152[H]  3.8   |  25  |  0.81    Ca    8.3[L]      28 Jun 2025 09:17  Phos  2.7     06-28  Mg     1.70     06-28    TPro  6.9  /  Alb  3.3  /  TBili  0.6  /  DBili  x   /  AST  17  /  ALT  14  /  AlkPhos  73  06-27      PT/INR - ( 27 Jun 2025 23:20 )   PT: 12.8 sec;   INR: 1.08 ratio         PTT - ( 27 Jun 2025 23:20 )  PTT:23.5 sec                BLOOD SMEAR INTERPRETATION:       RADIOLOGY & ADDITIONAL STUDIES:       HEMATOLOGY ONCOLOGY CONSULT     Patient is a 75y old  Male who presents with a chief complaint of diverticulitis (28 Jun 2025 06:23)      HPI:  75M with remote smoking history, recurrent/metastatic nasopharyngeal carcinoma on 2L carbo/taxol (last dose 6/25/25) admitted for perforated diverticulitis. Oncology consulted for ongoing management.    Pt seen at bedside with family, talking and answering questions. Complaining of neck discomfort and sharp abdominal pain.     PAST MEDICAL & SURGICAL HISTORY:  BPH with elevated PSA      Osteoarthritis      Hyperlipidemia      Anxiety      Cancer of sinus      Malignant neoplasm of nasopharynx      History of chemotherapy      S/P radiation therapy      History of knee replacement procedure of right knee      S/P total knee replacement, left      S/P cataract surgery      H/O colonoscopy      H/O cervical biopsy      History of incisional hernia repair          FAMILY HISTORY:  Family hx of colon cancer (Aunt)        MEDICATIONS  (STANDING):  ciprofloxacin   IVPB      ciprofloxacin   IVPB 400 milliGRAM(s) IV Intermittent every 12 hours  enoxaparin Injectable 40 milliGRAM(s) SubCutaneous every 24 hours  lactated ringers. 1000 milliLiter(s) (100 mL/Hr) IV Continuous <Continuous>  metroNIDAZOLE  IVPB 500 milliGRAM(s) IV Intermittent every 12 hours  metroNIDAZOLE  IVPB      silver sulfADIAZINE 1% Cream 1 Application(s) Topical daily    MEDICATIONS  (PRN):      Allergies    No Known Allergies    Intolerances        Vital Signs Last 24 Hrs  T(C): 37.2 (28 Jun 2025 12:00), Max: 38.2 (27 Jun 2025 21:52)  T(F): 98.9 (28 Jun 2025 12:00), Max: 100.7 (27 Jun 2025 21:52)  HR: 94 (28 Jun 2025 12:00) (87 - 171)  BP: 111/71 (28 Jun 2025 12:00) (110/71 - 148/68)  BP(mean): 82 (28 Jun 2025 12:00) (78 - 97)  RR: 14 (28 Jun 2025 12:00) (14 - 22)  SpO2: 94% (28 Jun 2025 12:00) (93% - 100%)    Parameters below as of 28 Jun 2025 12:00  Patient On (Oxygen Delivery Method): room air        PHYSICAL EXAM  General: adult in NAD  HEENT: clear oropharynx, anicteric sclera, pink conjunctiva  Neck: supple  CV: normal S1/S2 with no murmur rubs or gallops  Lungs: positive air movement b/l ant lungs,clear to auscultation, no wheezes, no rales  Abdomen: soft but exquisitely tender to light palpation, peritonitic  Ext: no clubbing cyanosis or edema  Skin: R base of neck purplish discoloration, skin thickening  Neuro: alert and answering questions    06-27-25 @ 07:01  -  06-28-25 @ 07:00  --------------------------------------------------------  IN: 100 mL / OUT: 0 mL / NET: 100 mL    06-28-25 @ 07:01  -  06-28-25 @ 12:24  --------------------------------------------------------  IN: 500 mL / OUT: 400 mL / NET: 100 mL      LABS:                          8.2    5.17  )-----------( 124      ( 28 Jun 2025 07:40 )             26.9         Mean Cell Volume : 106.3 fl  Mean Cell Hemoglobin : 32.4 pg  Mean Cell Hemoglobin Concentration : 30.5 g/dL  Auto Neutrophil # : x  Auto Lymphocyte # : x  Auto Monocyte # : x  Auto Eosinophil # : x  Auto Basophil # : x  Auto Neutrophil % : x  Auto Lymphocyte % : x  Auto Monocyte % : x  Auto Eosinophil % : x  Auto Basophil % : x      06-28    132[L]  |  97[L]  |  14  ----------------------------<  152[H]  3.8   |  25  |  0.81    Ca    8.3[L]      28 Jun 2025 09:17  Phos  2.7     06-28  Mg     1.70     06-28    TPro  6.9  /  Alb  3.3  /  TBili  0.6  /  DBili  x   /  AST  17  /  ALT  14  /  AlkPhos  73  06-27      PT/INR - ( 27 Jun 2025 23:20 )   PT: 12.8 sec;   INR: 1.08 ratio         PTT - ( 27 Jun 2025 23:20 )  PTT:23.5 sec                BLOOD SMEAR INTERPRETATION:       RADIOLOGY & ADDITIONAL STUDIES:       HEMATOLOGY ONCOLOGY CONSULT     Patient is a 75y old  Male who presents with a chief complaint of diverticulitis (28 Jun 2025 06:23)      HPI:  75M with remote smoking history, recurrent/metastatic nasopharyngeal carcinoma on 2L carbo/taxol (last dose 6/24/25) admitted for perforated diverticulitis. Oncology consulted for ongoing management.    Pt seen at bedside with family, talking and answering questions. Complaining of neck discomfort and sharp abdominal pain.     PAST MEDICAL & SURGICAL HISTORY:  BPH with elevated PSA      Osteoarthritis      Hyperlipidemia      Anxiety      Cancer of sinus      Malignant neoplasm of nasopharynx      History of chemotherapy      S/P radiation therapy      History of knee replacement procedure of right knee      S/P total knee replacement, left      S/P cataract surgery      H/O colonoscopy      H/O cervical biopsy      History of incisional hernia repair          FAMILY HISTORY:  Family hx of colon cancer (Aunt)        MEDICATIONS  (STANDING):  ciprofloxacin   IVPB      ciprofloxacin   IVPB 400 milliGRAM(s) IV Intermittent every 12 hours  enoxaparin Injectable 40 milliGRAM(s) SubCutaneous every 24 hours  lactated ringers. 1000 milliLiter(s) (100 mL/Hr) IV Continuous <Continuous>  metroNIDAZOLE  IVPB 500 milliGRAM(s) IV Intermittent every 12 hours  metroNIDAZOLE  IVPB      silver sulfADIAZINE 1% Cream 1 Application(s) Topical daily    MEDICATIONS  (PRN):      Allergies    No Known Allergies    Intolerances        Vital Signs Last 24 Hrs  T(C): 37.2 (28 Jun 2025 12:00), Max: 38.2 (27 Jun 2025 21:52)  T(F): 98.9 (28 Jun 2025 12:00), Max: 100.7 (27 Jun 2025 21:52)  HR: 94 (28 Jun 2025 12:00) (87 - 171)  BP: 111/71 (28 Jun 2025 12:00) (110/71 - 148/68)  BP(mean): 82 (28 Jun 2025 12:00) (78 - 97)  RR: 14 (28 Jun 2025 12:00) (14 - 22)  SpO2: 94% (28 Jun 2025 12:00) (93% - 100%)    Parameters below as of 28 Jun 2025 12:00  Patient On (Oxygen Delivery Method): room air        PHYSICAL EXAM  General: adult in NAD  HEENT: clear oropharynx, anicteric sclera, pink conjunctiva  Neck: supple  CV: normal S1/S2 with no murmur rubs or gallops  Lungs: positive air movement b/l ant lungs,clear to auscultation, no wheezes, no rales  Abdomen: soft but exquisitely tender to light palpation, peritonitic  Ext: no clubbing cyanosis or edema  Skin: R base of neck purplish discoloration, skin thickening  Neuro: alert and answering questions    06-27-25 @ 07:01  -  06-28-25 @ 07:00  --------------------------------------------------------  IN: 100 mL / OUT: 0 mL / NET: 100 mL    06-28-25 @ 07:01  -  06-28-25 @ 12:24  --------------------------------------------------------  IN: 500 mL / OUT: 400 mL / NET: 100 mL      LABS:                          8.2    5.17  )-----------( 124      ( 28 Jun 2025 07:40 )             26.9         Mean Cell Volume : 106.3 fl  Mean Cell Hemoglobin : 32.4 pg  Mean Cell Hemoglobin Concentration : 30.5 g/dL  Auto Neutrophil # : x  Auto Lymphocyte # : x  Auto Monocyte # : x  Auto Eosinophil # : x  Auto Basophil # : x  Auto Neutrophil % : x  Auto Lymphocyte % : x  Auto Monocyte % : x  Auto Eosinophil % : x  Auto Basophil % : x      06-28    132[L]  |  97[L]  |  14  ----------------------------<  152[H]  3.8   |  25  |  0.81    Ca    8.3[L]      28 Jun 2025 09:17  Phos  2.7     06-28  Mg     1.70     06-28    TPro  6.9  /  Alb  3.3  /  TBili  0.6  /  DBili  x   /  AST  17  /  ALT  14  /  AlkPhos  73  06-27      PT/INR - ( 27 Jun 2025 23:20 )   PT: 12.8 sec;   INR: 1.08 ratio         PTT - ( 27 Jun 2025 23:20 )  PTT:23.5 sec                BLOOD SMEAR INTERPRETATION:       RADIOLOGY & ADDITIONAL STUDIES:

## 2025-06-28 NOTE — ED ADULT NURSE REASSESSMENT NOTE - STATUS
as per handoff pt eval and treated for c/o Abd pain N&V. Pt intially arrives to RM 's SVT which broke with vagal maneuvers. Fluids tylenol , morphine givne . Pt hx of Nasoparngeal neoplasm Last chemo 6/24

## 2025-06-28 NOTE — CONSULT NOTE ADULT - ASSESSMENT
KIRSTEN [place]  is a 75y with remote smoking history, recurrent/metastatic nasopharyngeal carcinoma on 2L carbo/taxol (last dose 6/25/25) admitted for perforated diverticulitis. CT neck with complex multi spatial large infiltrating right neck tumor, necrotic areas of the tumor noted. Superimposed infection and abscess can not be excluded.  Increase in right anterolateral and lower facial soft tissue edema which may be secondary to a superimposed cellulitis. Stable appearance of the slightly thickened right side of the epiglottis. 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 Imaging discussed with attending.    RECOMMENDATIONS:  - No ENT intervention at this time, imaging consistent with known tumor burden  - Appreciate care per surgery and SICU

## 2025-06-28 NOTE — ED PROVIDER NOTE - ATTENDING CONTRIBUTION TO CARE
74 yo M pmhx nasopharyngeal carcinoma on CT, BPH, OA, HLD, pw c/o worsening abdominal pain and now with fevers, tachycardic and sob. Pt noted to go into SVT at bedside, broke on it's own with vagal maneuver. Treated with pain meds, fluids, tylenol for fever. Pt on cardiac monitor. Denies chest pain, palpitations, dizzziness, sob. Noted to have rhonchi on lung exam. Exquisitely tender abdomen. Has PEG in place. Still drinks liquids by mouth. r/o intra-abdominal pathology, r/o pneumonia. Plan for CT abd/pelv, cxr, admit    Pt signed out at 12 am.

## 2025-06-29 NOTE — PROGRESS NOTE ADULT - ASSESSMENT
75M on chemo with perf diverticulitis    exam improved today though   continue abx  NPO for now, possibly advance diet if pain improving  Continue IV abx  Appreciate SICU care

## 2025-06-29 NOTE — PROGRESS NOTE ADULT - SUBJECTIVE AND OBJECTIVE BOX
Surgery Progress Note     Overnight events: NAEO    Interval/Subjective:  Patient seen and examined.   __________________________________________________________________  Vitals:  T(C): 36.7 (08:00), Max: 37.2 (12:00)  HR: 81 (08:00) (75 - 141)  BP: 115/83 (08:00) (106/73 - 148/68)  RR: 12 (08:00) (12 - 23)  SpO2: 98% (05:00) (93% - 99%)    I & O's:  IN:    IV PiggyBack: 750 mL    Lactated Ringers: 2400 mL  Total IN: 3150 mL    OUT:    Voided (mL): 1875 mL  Total OUT: 1875 mL        06-28-25 @ 07:01  -  06-29-25 @ 07:00  --------------------------------------------------------  OUT:    Voided (mL): 1.03 mL/kg/hr  Total OUT: 1.03 mL/kg/hr      Physical Exam:  General: NAD, resting comfortably in bed  Abdomen: soft, tender in the lower abdomen (though distractible), nondistended; PEG tube in situ      __________________________________________________________________

## 2025-06-29 NOTE — PROGRESS NOTE ADULT - ASSESSMENT
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) s/p PEG placement with Dr Holloway 1/17/25 now presenting perforated diverticulitis complicated by SVT x 2 in the ED resolved with bearing down    NEUROLOGIC   - No standing pain meds, PRN for abdominal pain post exam  - A/Ox3    RESPIRATORY   - Monitor SpO2 goal >92%  - Incentive spirometry     CARDIOVASCULAR   - Monitor hemodynamics   - SVT HR 170s s/p lopressor 2.5 mg x 1 and 2nd episode broke with vagal maneuvers    GASTROINTESTINAL   - Diet: NPO  - Katefarms TF via PEG at home, last feeds was yesterday for lunch    /RENAL   - IV fluids: LR @ 125cc/hr  - Strict I/Os  - Monitor BMP qd  - Maintain fraser catheter, strict Is/Os  - Monitor electrolytes, replete PRN    HEMATOLOGIC  - Monitor H/H   - DVT ppx: Lovenox/SQH & SCD's  - New chemo started on 6/24 Paclitaxel and Carboplatin    INFECTIOUS DISEASE  - Monitor fever / WBC  - Cipro and Flagyl  - Gram - rods in blood 6/27    ENDOCRINE  - Monitor gluc    LINES  - PIV     DISPO: SICU

## 2025-06-29 NOTE — PROGRESS NOTE ADULT - SUBJECTIVE AND OBJECTIVE BOX
DATE OF SERVICE: 06-29-25 @ 09:57    INTERVAL HPI/OVERNIGHT EVENTS:  HR controlled, afebrile, wbc NL    MEDICATIONS  (STANDING):  ciprofloxacin   IVPB      ciprofloxacin   IVPB 400 milliGRAM(s) IV Intermittent every 12 hours  enoxaparin Injectable 40 milliGRAM(s) SubCutaneous every 24 hours  lactated ringers. 1000 milliLiter(s) (100 mL/Hr) IV Continuous <Continuous>  metroNIDAZOLE  IVPB 500 milliGRAM(s) IV Intermittent every 12 hours  metroNIDAZOLE  IVPB      silver sulfADIAZINE 1% Cream 1 Application(s) Topical daily    MEDICATIONS  (PRN):      Vital Signs Last 24 Hrs  T(C): 36.7 (29 Jun 2025 08:00), Max: 37.2 (28 Jun 2025 12:00)  T(F): 98 (29 Jun 2025 08:00), Max: 98.9 (28 Jun 2025 12:00)  HR: 81 (29 Jun 2025 08:00) (75 - 141)  BP: 115/83 (29 Jun 2025 08:00) (106/73 - 148/68)  BP(mean): 94 (29 Jun 2025 08:00) (78 - 107)  RR: 12 (29 Jun 2025 08:00) (12 - 23)  SpO2: 98% (29 Jun 2025 05:00) (93% - 99%)    Parameters below as of 29 Jun 2025 08:00  Patient On (Oxygen Delivery Method): room air      I&O's Detail    28 Jun 2025 07:01  -  29 Jun 2025 07:00  --------------------------------------------------------  IN:    IV PiggyBack: 750 mL    Lactated Ringers: 2400 mL  Total IN: 3150 mL    OUT:    Voided (mL): 1875 mL  Total OUT: 1875 mL    Total NET: 1275 mL      29 Jun 2025 07:01  -  29 Jun 2025 09:57  --------------------------------------------------------  IN:    Lactated Ringers: 200 mL  Total IN: 200 mL    OUT:  Total OUT: 0 mL    Total NET: 200 mL            Physical Exam:  General: WN/WD NAD  Respiratory: airway patent, respirations unlabored, no increased WoB  Neurology: A&Ox3, nonfocal, PERLA x 4  Abdominal: soft, + LLQ ttp, improved, non distneded      LABS:                        9.0    6.95  )-----------( 173      ( 29 Jun 2025 02:00 )             27.4     06-29    132[L]  |  95[L]  |  14  ----------------------------<  132[H]  3.7   |  25  |  0.84    Ca    8.4      29 Jun 2025 02:00  Phos  2.9     06-29  Mg     2.20     06-29    TPro  6.9  /  Alb  3.3  /  TBili  0.6  /  DBili  x   /  AST  17  /  ALT  14  /  AlkPhos  73  06-27    PT/INR - ( 27 Jun 2025 23:20 )   PT: 12.8 sec;   INR: 1.08 ratio         PTT - ( 27 Jun 2025 23:20 )  PTT:23.5 sec  Urinalysis Basic - ( 29 Jun 2025 02:00 )    Color: x / Appearance: x / SG: x / pH: x  Gluc: 132 mg/dL / Ketone: x  / Bili: x / Urobili: x   Blood: x / Protein: x / Nitrite: x   Leuk Esterase: x / RBC: x / WBC x   Sq Epi: x / Non Sq Epi: x / Bacteria: x        RADIOLOGY & ADDITIONAL STUDIES:

## 2025-06-29 NOTE — PROGRESS NOTE ADULT - SUBJECTIVE AND OBJECTIVE BOX
SICU Daily Progress Note  =====================================================  Interval/Overnight Events:    - NAEO      ALLERGIES:  No Known Allergies      --------------------------------------------------------------------------------------    MEDICATIONS:    Neurologic Medications  ondansetron Injectable 4 milliGRAM(s) IV Push once    Respiratory Medications    Cardiovascular Medications    Gastrointestinal Medications  lactated ringers. 1000 milliLiter(s) IV Continuous <Continuous>    Genitourinary Medications    Hematologic/Oncologic Medications  enoxaparin Injectable 40 milliGRAM(s) SubCutaneous every 24 hours    Antimicrobial/Immunologic Medications  ciprofloxacin   IVPB      ciprofloxacin   IVPB 400 milliGRAM(s) IV Intermittent every 12 hours  metroNIDAZOLE  IVPB 500 milliGRAM(s) IV Intermittent every 12 hours  metroNIDAZOLE  IVPB        Endocrine/Metabolic Medications    Topical/Other Medications  silver sulfADIAZINE 1% Cream 1 Application(s) Topical daily    --------------------------------------------------------------------------------------    VITAL SIGNS:  ICU Vital Signs Last 24 Hrs  T(C): 36.2 (28 Jun 2025 20:00), Max: 37.4 (28 Jun 2025 06:55)  T(F): 97.2 (28 Jun 2025 20:00), Max: 99.3 (28 Jun 2025 06:55)  HR: 75 (28 Jun 2025 23:00) (75 - 152)  BP: 122/72 (28 Jun 2025 23:00) (106/73 - 148/68)  BP(mean): 87 (28 Jun 2025 23:00) (78 - 101)  ABP: --  ABP(mean): --  RR: 16 (28 Jun 2025 23:00) (14 - 23)  SpO2: 98% (28 Jun 2025 23:00) (93% - 100%)    O2 Parameters below as of 28 Jun 2025 20:00  Patient On (Oxygen Delivery Method): room air      --------------------------------------------------------------------------------------    INS AND OUTS:  I&O's Detail    27 Jun 2025 07:01  -  28 Jun 2025 07:00  --------------------------------------------------------  IN:    Lactated Ringers: 100 mL  Total IN: 100 mL    OUT:  Total OUT: 0 mL    Total NET: 100 mL      28 Jun 2025 07:01  -  29 Jun 2025 00:12  --------------------------------------------------------  IN:    IV PiggyBack: 50 mL    Lactated Ringers: 1600 mL  Total IN: 1650 mL    OUT:    Voided (mL): 1025 mL  Total OUT: 1025 mL    Total NET: 625 mL        --------------------------------------------------------------------------------------    EXAM  EXAM  NEUROLOGY  Exam: Normal, NAD, alert, oriented x 3, no focal deficits.   HEENT  Exam: Normocephalic, atraumatic.  EOMI, right now large erythematous mass  RESPIRATORY  Exam: Lungs clear to auscultation, Normal expansion/effort.    Mechanical Ventilation:   CARDIOVASCULAR  Exam: S1, S2.  Regular rate and rhythm.  No Peripheral edema    GI/NUTRITION  Exam: Abdomen soft, LLQ TTP, Non-distended.  Wound:  PEG, LLQ tenderness  Current Diet:  NPO  VASCULAR  Exam: Extremities warm, pink, well-perfused.   MUSCULOSKELETAL  Exam: All extremities moving spontaneously without limitations.    SKIN:  Exam: Good skin turgor, no skin breakdown.      METABOLIC / FLUIDS / ELECTROLYTES  lactated ringers. 1000 milliLiter(s) IV Continuous <Continuous>      HEMATOLOGIC  [x] VTE Prophylaxis: enoxaparin Injectable 40 milliGRAM(s) SubCutaneous every 24 hours    Transfusions:	[] PRBC	[] Platelets		[] FFP	[] Cryoprecipitate    INFECTIOUS DISEASE  Antimicrobials/Immunologic Medications:  ciprofloxacin   IVPB      ciprofloxacin   IVPB 400 milliGRAM(s) IV Intermittent every 12 hours  metroNIDAZOLE  IVPB 500 milliGRAM(s) IV Intermittent every 12 hours  metroNIDAZOLE  IVPB        Day #      of     ***    TUBES / LINES / DRAINS  ***  [x] Peripheral IV  [] Central Venous Line     	[] R	[] L	[] IJ	[] Fem	[] SC	Date Placed:   [] Arterial Line		[] R	[] L	[] Fem	[] Rad	[] Ax	Date Placed:   [] PICC		[] Midline		[] Mediport  [] Urinary Catheter		Date Placed:   [x] Necessity of urinary, arterial, and venous catheters discussed    --------------------------------------------------------------------------------------    LABS                        8.2    5.17  )-----------( 124      ( 28 Jun 2025 07:40 )             26.9   06-28    132[L]  |  97[L]  |  14  ----------------------------<  152[H]  3.8   |  25  |  0.81    Ca    8.3[L]      28 Jun 2025 09:17  Phos  2.7     06-28  Mg     1.70     06-28    TPro  6.9  /  Alb  3.3  /  TBili  0.6  /  DBili  x   /  AST  17  /  ALT  14  /  AlkPhos  73  06-27    --------------------------------------------------------------------------------------

## 2025-06-29 NOTE — PROGRESS NOTE ADULT - ASSESSMENT
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 Had SVT which is being managed with vagal maneuvres. GNR in blood 6/27    Plan:  - NPO  - IVF  - Cipro/flagyl  - Serial abdominal exams for non-op management of perforated diverticulitis    A Team Surgery   [id number]

## 2025-06-30 NOTE — PROGRESS NOTE ADULT - ASSESSMENT
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) s/p PEG placement with Dr Holloway 1/17/25 now presenting perforated diverticulitis complicated by SVT x 2 in the ED resolved with bearing down    NEUROLOGIC   - No standing pain meds, PRN for abdominal pain post exam  - A/Ox3    RESPIRATORY   - Monitor SpO2 goal >92%  - Incentive spirometry     CARDIOVASCULAR   - Monitor hemodynamics   - SVT HR 170s s/p lopressor 2.5 mg x 1 and 2nd episode broke with vagal maneuvers    GASTROINTESTINAL   - Diet: NPO  - [employer] TF via PEG at home, last feeds was yesterday for lunch  - Serial abdominal exams     /RENAL   - IV fluids: LR @ 125cc/hr  - Strict I/Os  - Monitor BMP qd  - Maintain fraser catheter, strict Is/Os  - Monitor electrolytes, replete PRN    HEMATOLOGIC  - Monitor H/H   - DVT ppx: Lovenox/SQH & SCD's  - New chemo started on 6/24 Paclitaxel and Carboplatin    INFECTIOUS DISEASE  - Monitor fever / WBC  - Cipro and Flagyl  - Gram - rods in blood 6/27    ENDOCRINE  - Monitor gluc    LINES  - PIV     DISPO: Listed 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) s/p PEG placement with Dr Holloway 1/17/25 now presenting perforated diverticulitis complicated by SVT x 2 in the ED resolved with bearing down    NEUROLOGIC   - No standing pain meds, PRN for abdominal pain post exam  - A/Ox3    RESPIRATORY   - Monitor SpO2 goal >92%  - Incentive spirometry     CARDIOVASCULAR   - Monitor hemodynamics   - SVT HR 170s s/p lopressor 2.5 mg x 1 and 2nd episode broke with vagal maneuvers    GASTROINTESTINAL   - Diet: NPO w/ sips of water  - restart Tube feeds [employer] standard 1.4 @ half rate over 24 hours  - Home regimen: [employer] standard 1.4cal, 325ml/ each bolus, 4 bolus in 24hrs, 1300ml in total  - Serial abdominal exams     /RENAL   - mIVF 50cc/hr  - Strict I/Os  - voiding well  - Monitor electrolytes, replete PRN    HEMATOLOGIC  - Monitor H/H   - DVT ppx: Lovenox & SCD's  - New chemo started on 6/24 Paclitaxel and Carboplatin    INFECTIOUS DISEASE  - Monitor fever / WBC  - Cipro and Flagyl  - E.Coli bacteremia 6/27  - Repeat blood culture 6/29 NGTD    ENDOCRINE  - Monitor gluc    LINES  - PIV     DISPO: Listed

## 2025-06-30 NOTE — DIETITIAN INITIAL EVALUATION ADULT - OTHER INFO
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 Per chart review, pt had been receiving PEG bolus feeding of [employer] Standard 1.4 and had been tolerating well. Pt NPO at present with IVF of D5W + NS 0.45% and IVPB fluids for hydration - Spoke with SICU PA and asked to initiate enteral feeding also of [employer] Standard 1.4. PA to start feeding as 24 hr regimen and transition to bolus feedings once enteral feeding is well tolerated as he had diarrhea with blood yesterday. No other GI issues reported. NKFA of pt.    Visited with pt who communicated by writing. Pt nodded to verify that he was using [employer] Standard 1.4 at home, 4 containers/day. This was providing 1820 kcals, 81 gms protein, 923 mL free H2O in 1300 mL total volume and was giving pt 24 kcals/kg and 1.1 gm protein/kg of his admit wt. HIE records show that pt had a progressive weight gain over the past few months PTA to current admit wt. No physical findings identified from nutrition focused physical exam. K/Phos are diminished - please replete as needed prior to starting enteral feeding. Recommend continuing with nutrition plan of care as ordered at this time and transitioning to home bolus regimen when medically appropriate. RDN services to remain available as needed.

## 2025-06-30 NOTE — PROGRESS NOTE ADULT - SUBJECTIVE AND OBJECTIVE BOX
Surgery Progress Note     Interval/Subjective:  Patient seen and examined. Patient resting comfortably in bed. Patient complaining of dry mouth and some arm pain due to the potassium running in the PIV. Patient denies N/V, abdominal pain, or any other complaints at this time. Patient is passing flatus and BMs    __________________________________________________________________  Vitals:  T(C): 36.6 (06-30-25 @ 04:00), Max: 36.7 (06-29-25 @ 08:00)  HR: 91 (06-30-25 @ 04:00) (81 - 96)  BP: 123/84 (06-30-25 @ 04:00) (115/83 - 134/81)  BP(mean): 98 (06-30-25 @ 04:00) (94 - 99)  ABP: --  ABP(mean): --  RR: 16 (06-30-25 @ 04:00) (11 - 20)  SpO2: 94% (06-30-25 @ 04:00) (94% - 99%)  Wt(kg): --  CVP(mm Hg): --  CI: --  CAPILLARY BLOOD GLUCOSE       N/A      06-29 @ 07:01  -  06-30 @ 07:00  --------------------------------------------------------  IN:    dextrose 5% + sodium chloride 0.45% w/ Additives: 600 mL    IV PiggyBack: 400 mL    Lactated Ringers: 1200 mL  Total IN: 2200 mL    OUT:    Voided (mL): 1650 mL  Total OUT: 1650 mL    Total NET: 550 mL    Physical Exam:  General: NAD, resting comfortably in bed  Abdomen: soft, tender in the left lower abdomen (though distractible), nondistended; PEG tube in situ    LABS  CBC (06-30 @ 04:46)                              8.8[L]                         5.48    )----------------(  141[L]     --    % Neutrophils, --    % Lymphocytes, ANC: --                                  26.7[L]  CBC (06-29 @ 12:04)                              9.9[L]                         7.07    )----------------(  163        --    % Neutrophils, --    % Lymphocytes, ANC: --                                  30.2[L]    BMP (06-30 @ 04:46)             134[L]  |  97[L]   |  11    		Ca++ 1.13[L]  Ca 8.3[L]             ---------------------------------( 147[H]		Mg 1.60               3.0[L]  |  25      |  0.76  			Ph 2.4[L]  BMP (06-29 @ 02:00)             132[L]  |  95[L]   |  14    		Ca++ --      Ca 8.4                ---------------------------------( 132[H]		Mg 2.20               3.7     |  25      |  0.84  			Ph 2.9         Urinalysis (06-30 @ 04:46):     Color:  / Appearance:  / SG:  / pH:  / Gluc: 147[H] / Ketones:  / Bili:  / Urobili:  / Protein : / Nitrites:  / Leuk.Est:  / RBC:  / WBC:  / Sq Epi:  / Non Sq Epi:  / Bacteria        -> Blood Blood-Peripheral Culture (06-27 @ 23:30)       Growth in anaerobic bottle: Gram Negative Rods[!]    Blood Culture PCR[!]    Growth in anaerobic bottle: Escherichia coli  Direct identification is available within approximately 3-5  hours either by Blood Panel Multiplexed PCR or Direct  MALDI-TOF. 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 Had SVT which is being managed with vagal maneuvres. GNR in blood 6/27    Plan:  - NPO  - IVF  - Cipro/flagyl  - Serial abdominal exams for non-op management of perforated diverticulitis    A Team Surgery   [id number] Surgery Progress Note     Interval/Subjective:  Patient seen and examined. Patient resting comfortably in bed. Patient complaining of dry mouth and some arm pain due to the potassium running in the PIV. Patient denies N/V, abdominal pain, or any other complaints at this time. Patient is passing flatus and BMs    __________________________________________________________________  Vitals:  T(C): 36.6 (06-30-25 @ 04:00), Max: 36.7 (06-29-25 @ 08:00)  HR: 91 (06-30-25 @ 04:00) (81 - 96)  BP: 123/84 (06-30-25 @ 04:00) (115/83 - 134/81)  BP(mean): 98 (06-30-25 @ 04:00) (94 - 99)  ABP: --  ABP(mean): --  RR: 16 (06-30-25 @ 04:00) (11 - 20)  SpO2: 94% (06-30-25 @ 04:00) (94% - 99%)  Wt(kg): --  CVP(mm Hg): --  CI: --  CAPILLARY BLOOD GLUCOSE       N/A      06-29 @ 07:01  -  06-30 @ 07:00  --------------------------------------------------------  IN:    dextrose 5% + sodium chloride 0.45% w/ Additives: 600 mL    IV PiggyBack: 400 mL    Lactated Ringers: 1200 mL  Total IN: 2200 mL    OUT:    Voided (mL): 1650 mL  Total OUT: 1650 mL    Total NET: 550 mL    Physical Exam:  General: NAD, resting comfortably in bed  Abdomen: soft, tender in the left lower abdomen (though distractible), nondistended; PEG tube in situ    LABS  CBC (06-30 @ 04:46)                              8.8[L]                         5.48    )----------------(  141[L]     --    % Neutrophils, --    % Lymphocytes, ANC: --                                  26.7[L]  CBC (06-29 @ 12:04)                              9.9[L]                         7.07    )----------------(  163        --    % Neutrophils, --    % Lymphocytes, ANC: --                                  30.2[L]    BMP (06-30 @ 04:46)             134[L]  |  97[L]   |  11    		Ca++ 1.13[L]  Ca 8.3[L]             ---------------------------------( 147[H]		Mg 1.60               3.0[L]  |  25      |  0.76  			Ph 2.4[L]  BMP (06-29 @ 02:00)             132[L]  |  95[L]   |  14    		Ca++ --      Ca 8.4                ---------------------------------( 132[H]		Mg 2.20               3.7     |  25      |  0.84  			Ph 2.9         Urinalysis (06-30 @ 04:46):     Color:  / Appearance:  / SG:  / pH:  / Gluc: 147[H] / Ketones:  / Bili:  / Urobili:  / Protein : / Nitrites:  / Leuk.Est:  / RBC:  / WBC:  / Sq Epi:  / Non Sq Epi:  / Bacteria        -> Blood Blood-Peripheral Culture (06-27 @ 23:30)       Growth in anaerobic bottle: Gram Negative Rods[!]    Blood Culture PCR[!]    Growth in anaerobic bottle: Escherichia coli  Direct identification is available within approximately 3-5  hours either by Blood Panel Multiplexed PCR or Direct  MALDI-TOF. 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 Had SVT which is being managed with vagal maneuvres. GNR in blood 6/27    Plan:  - CLD  - IVF  - Cipro/flagyl  - Serial abdominal exams for non-op management of perforated diverticulitis    A Team Surgery   [id number] Surgery Progress Note     Interval/Subjective:  Patient seen and examined. Patient resting comfortably in bed. Patient complaining of dry mouth and some arm pain due to the potassium running in the PIV. Patient denies N/V, abdominal pain, or any other complaints at this time. Patient is passing flatus and BMs    __________________________________________________________________  Vitals:  T(C): 36.6 (06-30-25 @ 04:00), Max: 36.7 (06-29-25 @ 08:00)  HR: 91 (06-30-25 @ 04:00) (81 - 96)  BP: 123/84 (06-30-25 @ 04:00) (115/83 - 134/81)  BP(mean): 98 (06-30-25 @ 04:00) (94 - 99)  ABP: --  ABP(mean): --  RR: 16 (06-30-25 @ 04:00) (11 - 20)  SpO2: 94% (06-30-25 @ 04:00) (94% - 99%)  Wt(kg): --  CVP(mm Hg): --  CI: --  CAPILLARY BLOOD GLUCOSE       N/A      06-29 @ 07:01  -  06-30 @ 07:00  --------------------------------------------------------  IN:    dextrose 5% + sodium chloride 0.45% w/ Additives: 600 mL    IV PiggyBack: 400 mL    Lactated Ringers: 1200 mL  Total IN: 2200 mL    OUT:    Voided (mL): 1650 mL  Total OUT: 1650 mL    Total NET: 550 mL    Physical Exam:  General: NAD, resting comfortably in bed  Abdomen: soft, tender in the left lower abdomen (though distractible), nondistended; PEG tube in situ    LABS  CBC (06-30 @ 04:46)                              8.8[L]                         5.48    )----------------(  141[L]     --    % Neutrophils, --    % Lymphocytes, ANC: --                                  26.7[L]  CBC (06-29 @ 12:04)                              9.9[L]                         7.07    )----------------(  163        --    % Neutrophils, --    % Lymphocytes, ANC: --                                  30.2[L]    BMP (06-30 @ 04:46)             134[L]  |  97[L]   |  11    		Ca++ 1.13[L]  Ca 8.3[L]             ---------------------------------( 147[H]		Mg 1.60               3.0[L]  |  25      |  0.76  			Ph 2.4[L]  BMP (06-29 @ 02:00)             132[L]  |  95[L]   |  14    		Ca++ --      Ca 8.4                ---------------------------------( 132[H]		Mg 2.20               3.7     |  25      |  0.84  			Ph 2.9         Urinalysis (06-30 @ 04:46):     Color:  / Appearance:  / SG:  / pH:  / Gluc: 147[H] / Ketones:  / Bili:  / Urobili:  / Protein : / Nitrites:  / Leuk.Est:  / RBC:  / WBC:  / Sq Epi:  / Non Sq Epi:  / Bacteria        -> Blood Blood-Peripheral Culture (06-27 @ 23:30)       Growth in anaerobic bottle: Gram Negative Rods[!]    Blood Culture PCR[!]    Growth in anaerobic bottle: Escherichia coli  Direct identification is available within approximately 3-5  hours either by Blood Panel Multiplexed PCR or Direct  MALDI-TOF. 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 Had SVT which is being managed with vagal maneuvres. GNR in blood 6/27    Plan:  - Okay to resume TF  - IVF  - Cipro/flagyl  - Serial abdominal exams for non-op management of perforated diverticulitis    A Team Surgery   [id number]

## 2025-06-30 NOTE — DIETITIAN INITIAL EVALUATION ADULT - PERTINENT LABORATORY DATA
06-30    134[L]  |  97[L]  |  11  ----------------------------<  147[H]  3.0[L]   |  25  |  0.76    Ca    8.3[L]      30 Jun 2025 04:46  Phos  2.4     06-30  Mg     1.60     06-30

## 2025-06-30 NOTE — DIETITIAN INITIAL EVALUATION ADULT - ENTERAL
Transition to home bolus regimen of [facility] Standard 1.4, 325 mL bolus, every 6 hrs, 1300 mL total volume/day when medically appropriate.

## 2025-06-30 NOTE — DIETITIAN INITIAL EVALUATION ADULT - ORAL INTAKE PTA/DIET HISTORY
PEG bolus feeding with [facility] Standard 1.4, 325 mL bolus, 4 times per day, 1300 mL total volume.

## 2025-06-30 NOTE — CHART NOTE - NSCHARTNOTEFT_GEN_A_CORE
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 While in SICU had multiple episodes of SVT, most recently on day of downgrade (6/30) but it resolved without intervention. He was started on tube feeds which he is tolerating. On cipro/flagyl for treatment of diverticulitis. Blood cultures on admission (6/27) + for e.coli, repeat BCx (6/29) - NGTD     For Follow-Up:  - f/u titrating TF to goal   - abx: cipro/flagyl   - f/u repeat BCx (6/29)    Vital Signs Last 24 Hrs  T(C): 36.8 (30 Jun 2025 22:00), Max: 36.8 (30 Jun 2025 20:00)  T(F): 98.2 (30 Jun 2025 22:00), Max: 98.2 (30 Jun 2025 20:00)  HR: 89 (30 Jun 2025 22:00) (85 - 92)  BP: 122/82 (30 Jun 2025 22:00) (118/86 - 146/58)  BP(mean): 83 (30 Jun 2025 20:00) (82 - 98)  RR: 17 (30 Jun 2025 22:00) (15 - 21)  SpO2: 99% (30 Jun 2025 22:00) (94% - 99%)    Parameters below as of 30 Jun 2025 22:00  Patient On (Oxygen Delivery Method): room air      I&O's Summary    29 Jun 2025 07:01  -  30 Jun 2025 07:00  --------------------------------------------------------  IN: 2300 mL / OUT: 1650 mL / NET: 650 mL    30 Jun 2025 07:01  -  30 Jun 2025 22:49  --------------------------------------------------------  IN: 1040 mL / OUT: 800 mL / NET: 240 mL          MEDICATIONS  (STANDING):  atorvastatin 40 milliGRAM(s) Oral at bedtime  ciprofloxacin   IVPB      ciprofloxacin   IVPB 400 milliGRAM(s) IV Intermittent every 12 hours  dextrose 5% + sodium chloride 0.45% with potassium chloride 20 mEq/L 1000 milliLiter(s) (50 mL/Hr) IV Continuous <Continuous>  enoxaparin Injectable 40 milliGRAM(s) SubCutaneous every 24 hours  finasteride 5 milliGRAM(s) Oral daily  metroNIDAZOLE  IVPB 500 milliGRAM(s) IV Intermittent every 12 hours  metroNIDAZOLE  IVPB      silver sulfADIAZINE 1% Cream 1 Application(s) Topical daily  tamsulosin 0.4 milliGRAM(s) Oral at bedtime    MEDICATIONS  (PRN):  sodium chloride 0.65% Nasal 1 Spray(s) Both Nostrils four times a day PRN Nasal Congestion        LABS                                            8.8                   Neurophils% (auto):   x      (06-30 @ 04:46):    5.48 )-----------(141          Lymphocytes% (auto):  x                                             26.7                   Eosinphils% (auto):   x        Manual%: Neutrophils x    ; Lymphocytes x    ; Eosinophils x    ; Bands%: x    ; Blasts x                                    134    |  97     |  11                  Calcium: 8.3   / iCa: 1.13   (06-30 @ 04:46)    ----------------------------<  147       Magnesium: 1.60                             3.0     |  25     |  0.76             Phosphorous: 2.4

## 2025-06-30 NOTE — PROGRESS NOTE ADULT - SUBJECTIVE AND OBJECTIVE BOX
SICU Daily Progress Note  =====================================================  Interval/Overnight Events:       - Listed for floor     ALLERGIES:  No Known Allergies      --------------------------------------------------------------------------------------    MEDICATIONS:    Neurologic Medications    Respiratory Medications    Cardiovascular Medications    Gastrointestinal Medications  dextrose 5% + sodium chloride 0.45% with potassium chloride 20 mEq/L 1000 milliLiter(s) IV Continuous <Continuous>    Genitourinary Medications    Hematologic/Oncologic Medications  enoxaparin Injectable 40 milliGRAM(s) SubCutaneous every 24 hours    Antimicrobial/Immunologic Medications  ciprofloxacin   IVPB      ciprofloxacin   IVPB 400 milliGRAM(s) IV Intermittent every 12 hours  metroNIDAZOLE  IVPB 500 milliGRAM(s) IV Intermittent every 12 hours  metroNIDAZOLE  IVPB        Endocrine/Metabolic Medications    Topical/Other Medications  silver sulfADIAZINE 1% Cream 1 Application(s) Topical daily  sodium chloride 0.65% Nasal 1 Spray(s) Both Nostrils four times a day PRN Nasal Congestion    --------------------------------------------------------------------------------------    VITAL SIGNS:  T(C): 36.7 (06-29-25 @ 20:00), Max: 36.8 (06-29-25 @ 04:00)  HR: 96 (06-29-25 @ 20:00) (81 - 96)  BP: 128/87 (06-29-25 @ 20:00) (112/84 - 139/75)  RR: 20 (06-29-25 @ 20:00) (11 - 20)  SpO2: 98% (06-29-25 @ 20:00) (94% - 99%)  --------------------------------------------------------------------------------------    INS AND OUTS:    06-28-25 @ 07:01  -  06-29-25 @ 07:00  --------------------------------------------------------  IN: 3150 mL / OUT: 1875 mL / NET: 1275 mL    06-29-25 @ 07:01  -  06-30-25 @ 00:05  --------------------------------------------------------  IN: 1700 mL / OUT: 850 mL / NET: 850 mL      --------------------------------------------------------------------------------------    EXAM  NEUROLOGY  Exam: Normal, NAD, alert, oriented x 3, no focal deficits.   HEENT  Exam: Normocephalic, atraumatic.  EOMI, right now large erythematous mass  RESPIRATORY  Exam: Lungs clear to auscultation, Normal expansion/effort.    CARDIOVASCULAR  Exam: S1, S2.  Regular rate and rhythm.  No Peripheral edema    GI/NUTRITION  Exam: Abdomen soft, LLQ TTP, Non-distended.  Wound:  PEG, LLQ tenderness  Current Diet:  NPO  VASCULAR  Exam: Extremities warm, pink, well-perfused.   MUSCULOSKELETAL  Exam: All extremities moving spontaneously without limitations.    SKIN:  Exam: Good skin turgor, no skin breakdown.      --------------------------------------------------------------------------------------    LABS    PENDING   --------------------------------------------------------------------------------------

## 2025-06-30 NOTE — DIETITIAN INITIAL EVALUATION ADULT - NS FNS DIET ORDER
Diet, NPO:   With Ice Chips/Sips of Water  Tube Feeding Modality: Gastrostomy  [place] standard 1.4cal  Total Volume for 24 Hours (mL): 720  Continuous  Starting Tube Feed Rate {mL per Hour}: 20  Increase Tube Feed Rate by (mL): 10     Every 4 hours  Until Goal Tube Feed Rate (mL per Hour): 30  Tube Feed Duration (in Hours): 24  Tube Feed Start Time: 09:00 (06-30-25 @ 10:07)

## 2025-06-30 NOTE — DIETITIAN INITIAL EVALUATION ADULT - PERTINENT MEDS FT
MEDICATIONS  (STANDING):  ciprofloxacin   IVPB      ciprofloxacin   IVPB 400 milliGRAM(s) IV Intermittent every 12 hours  dextrose 5% + sodium chloride 0.45% with potassium chloride 20 mEq/L 1000 milliLiter(s) (50 mL/Hr) IV Continuous <Continuous>  enoxaparin Injectable 40 milliGRAM(s) SubCutaneous every 24 hours  metroNIDAZOLE  IVPB 500 milliGRAM(s) IV Intermittent every 12 hours  metroNIDAZOLE  IVPB      potassium phosphate / sodium phosphate Powder (PHOS-NaK) 1 Packet(s) Oral every 4 hours  silver sulfADIAZINE 1% Cream 1 Application(s) Topical daily    MEDICATIONS  (PRN):  sodium chloride 0.65% Nasal 1 Spray(s) Both Nostrils four times a day PRN Nasal Congestion

## 2025-06-30 NOTE — DIETITIAN INITIAL EVALUATION ADULT - ADD RECOMMEND
1. Continue to monitor nutritional intake, labs, weights, BM, skin, edema and clinical course. 2. Follow pt as per protocol.

## 2025-07-01 ENCOUNTER — APPOINTMENT (OUTPATIENT)
Dept: HEMATOLOGY ONCOLOGY | Facility: CLINIC | Age: 75
End: 2025-07-01

## 2025-07-01 ENCOUNTER — APPOINTMENT (OUTPATIENT)
Dept: GERIATRICS | Facility: CLINIC | Age: 75
End: 2025-07-01

## 2025-07-01 ENCOUNTER — APPOINTMENT (OUTPATIENT)
Dept: INFUSION THERAPY | Facility: HOSPITAL | Age: 75
End: 2025-07-01

## 2025-07-01 NOTE — PROGRESS NOTE ADULT - ASSESSMENT
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 Had SVT which is being managed with vagal maneuvres. GNR in blood 6/27    Plan:  - C/W TF  - IVF  - CW Cipro/flagyl  -ID consult for Antibiotic planning     - Serial abdominal exams for non-op management of perforated diverticulitis    A Team Surgery   [id number]

## 2025-07-01 NOTE — CONSULT NOTE ADULT - ASSESSMENT
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 Pt remains afebrile, VSS, without leukocytosis, with abdominal pain improving over hospital admission.      #Diverticulitis  #Bacteremia  #GNR bacteremia    E Coli bacteremia is likely secondary to GI source (perforated diverticulitis). We are reassured that repeat bcx drawn 2 days after initial have shown no growth to date. Additionally reassuring that pt remains clinically stable with improvements in abdominal sx since initiating cipro/flagyl for diverticulitis management. Cipro/flagyl are also appropriate treatment for E Coli bacteremia, so we would recommend continuing these two antibiotics for a total of 14 days given his immunocompromised status. No need to repeat bcx since second cultures drawn this admission were negative.    Plan:  -If pt is going to discharge within 1 day, maintain on cipro and flagyl  -If pt is going to remain inpatient for 2+ days, please transition from cipro to ceftriaxone (for better side effect profile), then back to cipro/flagyl as oral agents on discharge  -Recommend 14 days total length of abx treatment   -CTM fever curve and WBC count     Raiza Rodas MD  Internal Medicine PGY1

## 2025-07-01 NOTE — PHYSICAL THERAPY INITIAL EVALUATION ADULT - ADDITIONAL COMMENTS
Lives with his wife (daughter lives upstairs) in a house with no stairs to enter. Ambulates independently. Independent ADLs.    After session, patient left semi supine in bed with all lines intact in NAD. RN aware.

## 2025-07-01 NOTE — CHART NOTE - NSCHARTNOTEFT_GEN_A_CORE
Called by nurses for pt having confusion when nurses came into the room overnight. Per nursing staff he was standing out of bed and pulling at PEG tube. Pt was seen at bedside he was mildly confused but was reoriented and later A&Ox3, it was explained to pt why tube feedings were on overnight and he agreed to restart feeds. Pt placed on direct observation because of risk of falling and pulling out PEG tube. Per SICU team he had brief episodes of confusion overnight while in SICU.

## 2025-07-01 NOTE — PROGRESS NOTE ADULT - SUBJECTIVE AND OBJECTIVE BOX
TEAM [ A ] Surgery Daily Progress Note  =====================================================    SUBJECTIVE: Patient seen and examined at bedside on AM rounds. Reported patient had episode of confusion overnight but VSS and pt. can be redirected. No distress.  Patient otherwise now reports feeling well, offered no complaint, pain controlled on current regimen. Tolerating diet, denies nausea, vomiting. OOB/Amublating as tolerated. Denies fever, chills.    ALLERGIES:  No Known Allergies      --------------------------------------------------------------------------------------    MEDICATIONS:    Neurologic Medications    Respiratory Medications    Cardiovascular Medications    Gastrointestinal Medications  dextrose 5% + sodium chloride 0.45% with potassium chloride 20 mEq/L 1000 milliLiter(s) IV Continuous <Continuous>  potassium chloride  10 mEq/100 mL IVPB 10 milliEquivalent(s) IV Intermittent every 1 hour    Genitourinary Medications  tamsulosin 0.4 milliGRAM(s) Oral at bedtime    Hematologic/Oncologic Medications  enoxaparin Injectable 40 milliGRAM(s) SubCutaneous every 24 hours    Antimicrobial/Immunologic Medications  ciprofloxacin   IVPB      ciprofloxacin   IVPB 400 milliGRAM(s) IV Intermittent every 12 hours  metroNIDAZOLE  IVPB 500 milliGRAM(s) IV Intermittent every 12 hours  metroNIDAZOLE  IVPB        Endocrine/Metabolic Medications  atorvastatin 40 milliGRAM(s) Oral at bedtime  finasteride 5 milliGRAM(s) Oral daily    Topical/Other Medications  silver sulfADIAZINE 1% Cream 1 Application(s) Topical daily  sodium chloride 0.65% Nasal 1 Spray(s) Both Nostrils four times a day PRN Nasal Congestion    --------------------------------------------------------------------------------------    VITAL SIGNS:  T(C): 36.5 (07-01-25 @ 09:41), Max: 36.8 (06-30-25 @ 20:00)  HR: 91 (07-01-25 @ 09:42) (87 - 112)  BP: 129/73 (07-01-25 @ 09:41) (122/74 - 145/65)  RR: 20 (07-01-25 @ 09:41) (16 - 20)  SpO2: 98% (07-01-25 @ 09:41) (95% - 99%)  --------------------------------------------------------------------------------------    EXAM    General: NAD, resting in bed comfortably.  Cardiac: regular rate, warm and well perfused  Respiratory: Nonlabored respirations, normal cw expansion.  Abdomen: soft, nontender, nondistended. incision is c/d/i,  Extremities: normal strength, FROM, no deformities    --------------------------------------------------------------------------------------    LABS                        8.9    5.17  )-----------( 162 ( 01 Jul 2025 05:30 )             26.6       --------------------------------------------------------------------------------------  07-01    129[L]  |  94[L]  |  9   ----------------------------<  147[H]  3.0[L]   |  26  |  0.82    Ca    8.6      01 Jul 2025 05:30  Phos  2.5     07-01  Mg     1.80     07-01 07-01    129[L]  |  94[L]  |  9   ----------------------------<  147[H]  3.0[L]   |  26  |  0.82    Ca    8.6      01 Jul 2025 05:30  Phos  2.5     07-01  Mg     1.80     07-01      INS AND OUTS:    06-30-25 @ 07:01  -  07-01-25 @ 07:00  --------------------------------------------------------  IN: 1360 mL / OUT: 950 mL / NET: 410 mL    07-01-25 @ 07:01  -  07-01-25 @ 13:05  --------------------------------------------------------  IN: 360 mL / OUT: 0 mL / NET: 360 mL      --------------------------------------------------------------------------------------

## 2025-07-01 NOTE — PROGRESS NOTE ADULT - SUBJECTIVE AND OBJECTIVE BOX
SURGERY      INTERVAL EVENTS/SUBJECTIVE:   Some confusion overnight, was able to be re-oriented  Still w LLQ tenderness, not worsening   ______________________________________________  OBJECTIVE:   T(C): 36.5 (25 @ 09:41), Max: 36.8 (25 @ 20:00)  HR: 91 (25 @ 09:42) (87 - 112)  BP: 129/73 (25 @ 09:41) (122/74 - 146/58)  RR: 20 (25 @ 09:41) (16 - 20)  SpO2: 98% (25 @ 09:41) (95% - 99%)  Wt(kg): --  CAPILLARY BLOOD GLUCOSE        I&O's Detail    2025 07:01  -  2025 07:00  --------------------------------------------------------  IN:    dextrose 5% + sodium chloride 0.45% w/ Additives: 900 mL    IV PiggyBack: 100 mL    Miscellaneous Tube Feedin mL  Total IN: 1360 mL    OUT:    Voided (mL): 950 mL  Total OUT: 950 mL    Total NET: 410 mL      2025 07:01  -  2025 11:51  --------------------------------------------------------  IN:    dextrose 5% + sodium chloride 0.45% w/ Additives: 100 mL    IV PiggyBack: 200 mL    Miscellaneous Tube Feedin mL  Total IN: 360 mL    OUT:    Oral Fluid: 0 mL  Total OUT: 0 mL    Total NET: 360 mL          Physical exam:  Gen: NAD  Abdomen: Soft, nondistended, LLQ tenderness   ______________________________________________  LABS:  CBC Full  -  ( 2025 05:30 )  WBC Count : 5.17 K/uL  RBC Count : 2.76 M/uL  Hemoglobin : 8.9 g/dL  Hematocrit : 26.6 %  Platelet Count - Automated : 162 K/uL  Mean Cell Volume : 96.4 fl  Mean Cell Hemoglobin : 32.2 pg  Mean Cell Hemoglobin Concentration : 33.5 g/dL  Auto Neutrophil # : x  Auto Lymphocyte # : x  Auto Monocyte # : x  Auto Eosinophil # : x  Auto Basophil # : x  Auto Neutrophil % : x  Auto Lymphocyte % : x  Auto Monocyte % : x  Auto Eosinophil % : x  Auto Basophil % : x    07-    129[L]  |  94[L]  |  9   ----------------------------<  147[H]  3.0[L]   |  26  |  0.82    Ca    8.6      2025 05:30  Phos  2.5     07-01  Mg     1.80     07-01      _____________________________________________  RADIOLOGY:     30-May-2024

## 2025-07-01 NOTE — PHYSICAL THERAPY INITIAL EVALUATION ADULT - NSPTDISCHREC_GEN_A_CORE
Patient is independent with functional mobility and will be removed from PT program at this time. Please reconsult if needed./No skilled PT needs

## 2025-07-01 NOTE — PROGRESS NOTE ADULT - ASSESSMENT
Perforated diverticulitis  Nasopharyngeal cancer on chemo, s/p PEG   E coli bacteremia   SVT, resolved    Continue IV antibiotics  Continue tube feeds  ID consult, follow blood cultures    Medicine consult

## 2025-07-01 NOTE — PHYSICAL THERAPY INITIAL EVALUATION ADULT - PERTINENT HX OF CURRENT PROBLEM, REHAB EVAL
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

## 2025-07-01 NOTE — CONSULT NOTE ADULT - SUBJECTIVE AND OBJECTIVE BOX
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 He reports the symptoms began on Tuesday while at chemotherapy with crampy abdominal pain associated with nausea. He has been otherwise tolerating PEG tube feeds. Denies fevers at home. No diarrhea. Never had similar episodes. Had a colonoscopy 5 years ago which showed benign polyps.     In the ED, patient was noted to be febrile to 100.7 and in SVT with HR in 170s. Given beta blockers, antibiotics and IVF resuscitation with improvement (28 Jun 2025 05:04)      REVIEW OF SYSTEMS  All review of systems negative, except for those marked:  General:		[] Abnormal:  	[] Night Sweats		[] Fever		[] Weight Loss  Pulmonary/Cough:	[] Abnormal:  Cardiac/Chest Pain:	[] Abnormal:  Gastrointestinal:	[] Abnormal:  Eyes:			[] Abnormal:  ENT:			[] Abnormal:  Dysuria:		[] Abnormal:  Musculoskeletal	:	[] Abnormal:  Endocrine:		[] Abnormal:  Lymph Nodes:		[] Abnormal:  Headache:		[] Abnormal:  Skin:			[] Abnormal:  Allergy/Immune:	[] Abnormal:  Psychiatric:		[] Abnormal:  [] All other review of systems negative  [] Unable to obtain (explain):    Recent Ill Contacts:	[x] No	[] Yes:  Recent Travel History:	[x] No	[] Yes:  Recent Animal/Insect Exposure/Tick Bites:	[x] No	[] Yes:    Allergies    No Known Allergies    Intolerances      Antimicrobials:  ciprofloxacin   IVPB      ciprofloxacin   IVPB 400 milliGRAM(s) IV Intermittent every 12 hours  metroNIDAZOLE  IVPB 500 milliGRAM(s) IV Intermittent every 12 hours  metroNIDAZOLE  IVPB          Other Medications:  atorvastatin 40 milliGRAM(s) Oral at bedtime  dextrose 5% + sodium chloride 0.45% with potassium chloride 20 mEq/L 1000 milliLiter(s) IV Continuous <Continuous>  enoxaparin Injectable 40 milliGRAM(s) SubCutaneous every 24 hours  finasteride 5 milliGRAM(s) Oral daily  silver sulfADIAZINE 1% Cream 1 Application(s) Topical daily  sodium chloride 0.65% Nasal 1 Spray(s) Both Nostrils four times a day PRN  tamsulosin 0.4 milliGRAM(s) Oral at bedtime      FAMILY HISTORY:  Family hx of colon cancer (Aunt)      PAST MEDICAL & SURGICAL HISTORY:  BPH with elevated PSA      Osteoarthritis      Hyperlipidemia      Anxiety      Cancer of sinus      Malignant neoplasm of nasopharynx      History of chemotherapy      S/P radiation therapy      History of knee replacement procedure of right knee      S/P total knee replacement, left      S/P cataract surgery      H/O colonoscopy      H/O cervical biopsy      History of incisional hernia repair        SOCIAL HISTORY:    IMMUNIZATIONS  [x] Up to Date		[] Not Up to Date:  Recent Immunizations:	[x] No	[] Yes:    Daily     Daily   Head Circumference:  Vital Signs Last 24 Hrs  T(C): 36.9 (01 Jul 2025 13:34), Max: 36.9 (01 Jul 2025 13:34)  T(F): 98.4 (01 Jul 2025 13:34), Max: 98.4 (01 Jul 2025 13:34)  HR: 88 (01 Jul 2025 13:34) (87 - 112)  BP: 127/70 (01 Jul 2025 13:34) (122/74 - 145/65)  BP(mean): 83 (30 Jun 2025 20:00) (83 - 93)  RR: 20 (01 Jul 2025 13:34) (16 - 20)  SpO2: 99% (01 Jul 2025 13:34) (95% - 99%)    Parameters below as of 01 Jul 2025 13:34  Patient On (Oxygen Delivery Method): room air        PHYSICAL EXAM  All physical exam findings normal, except for those marked:  General:	Normal: alert, neither acutely nor chronically ill-appearing, well developed/well   .		nourished, no respiratory distress  .		[] Abnormal:  Eyes		Normal: no conjunctival injection, no discharge, no photophobia, intact   .		extraocular movements, sclera not icteric  .		[] Abnormal:  ENT:		Normal: normal tympanic membranes; external ear normal, nares normal without   .		discharge, no pharyngeal erythema or exudates, no oral mucosal lesions, normal   .		tongue and lips  .		[] Abnormal:  Neck		Normal: supple, full range of motion, no nuchal rigidity  .		[x] Abnormal: Large   Lymph Nodes	Normal: normal size and consistency, non-tender  .		[] Abnormal:  Cardiovascular	Normal: regular rate and variability; Normal S1, S2; No murmur  .		[] Abnormal:  Respiratory	Normal: no wheezing or crackles, bilateral audible breath sounds, no retractions  .		[] Abnormal:  Abdominal	Normal: soft; non-distended; non-tender; no hepatosplenomegaly or masses  .		[] Abnormal:  Extremities	Normal: FROM x4, no cyanosis or edema, symmetric pulses  .		[] Abnormal:  Skin		Normal: skin intact and not indurated; no rash, no desquamation  .		[] Abnormal:  Neurologic	Normal: alert, oriented as age-appropriate, affect appropriate; no weakness, no   .		facial asymmetry, moves all extremities, normal gait-child older than 18 months  .		[] Abnormal:  Musculoskeletal		Normal: no joint swelling, erythema, or tenderness; full range of motion   .			with no contractures; no muscle tenderness; no clubbing; no cyanosis;   .			no edema  .			[] Abnormal    Respiratory Support:		[x] No	[] Yes:  Vasoactive medication infusion:	[x] No	[] Yes:  Venous catheters:		[] No	[x] Yes:  Bladder catheter:		[x] No	[] Yes:  Other catheters or tubes:	[x] No	[] Yes:    Lab Results:                        8.9    5.17  )-----------( 162      ( 01 Jul 2025 05:30 )             26.6     07-01    129[L]  |  94[L]  |  9   ----------------------------<  147[H]  3.0[L]   |  26  |  0.82    Ca    8.6      01 Jul 2025 05:30  Phos  2.5     07-01  Mg     1.80     07-01          Urinalysis Basic - ( 01 Jul 2025 05:30 )    Color: x / Appearance: x / SG: x / pH: x  Gluc: 147 mg/dL / Ketone: x  / Bili: x / Urobili: x   Blood: x / Protein: x / Nitrite: x   Leuk Esterase: x / RBC: x / WBC x   Sq Epi: x / Non Sq Epi: x / Bacteria: x        MICROBIOLOGY:  6/27 Site 1:  Culture - Blood (06.27.25 @ 23:20)    Specimen Source: Blood Blood-Peripheral   Culture Results:   No growth at 72 Hours    6/27 Site 2:  Culture - Blood (06.27.25 @ 23:30)    -  Escherichia coli: Detec   Gram Stain:   Growth in anaerobic bottle: Gram Negative Rods   -  Ampicillin: S <=8 These ampicillin results predict results for amoxicillin   -  Ampicillin/Sulbactam: S <=4/2   -  Aztreonam: S <=4   -  Cefazolin: S <=2   -  Cefepime: S <=2   -  Cefoxitin: S <=8   -  Ceftriaxone: S <=1   -  Ciprofloxacin: S <=0.25   -  Ertapenem: S <=0.5   -  Gentamicin: S <=2   -  Imipenem: S <=1   -  Levofloxacin: S <=0.5   -  Meropenem: S <=1   -  Piperacillin/Tazobactam: S <=8   -  Tigecycline: S <=2 Interpretations based on FDA breakpoints   -  Tobramycin: S <=2   -  Trimethoprim/Sulfamethoxazole: S <=0.5/9.5   Specimen Source: Blood Blood-Peripheral   Organism: Blood Culture PCR   Organism: Escherichia coli    6/29 Site 1:  Culture - Blood (collected 29 Jun 2025 02:00)  Source: Blood Blood  Preliminary Report (01 Jul 2025 11:01):    No growth at 48 Hours  6/29 Site 2:  Culture - Blood (collected 29 Jun 2025 01:00)  Source: Blood Blood-Peripheral  Preliminary Report (01 Jul 2025 11:01):    No growth at 48 Hours        RADIOLOGY:  < from: CT Neck Soft Tissue w/ IV Cont (06.28.25 @ 01:26) >  IMPRESSION:  1.   Complex multi spatial large infiltrating right neck tumor. Necrotic   areas of the tumor noted. Superimposed infection and abscess can not be   excluded.  Increase in right anterolateral and lower facial soft tissue   edema which may be  secondary to a superimposed cellulitis.  2.   Stable appearance of the slightly thickened right side of the   epiglottis. 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 Other   findings as noted.    < from: CT Abdomen and Pelvis w/ IV Cont (06.28.25 @ 01:26) >  IMPRESSION:    1. Severe acute sigmoid colon diverticulitis with perforation. Much of   the perforation is contained in the left lower quadrant with   retroperitoneal gas extending caudally all the way up to the diaphragm.   No abscess.  2. Bladder wall thickening could possibly represent cystitis.      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 ID consulted for an initial bcx that was positive for E Coli.     Pt initially presented to the ED on night of 6/27 after 3 days of crampy abdominal pain associated with nausea. No associated fever, vomiting, diarrhea. In the ED, patient was noted to be febrile to 100.7 and in SVT with HR in 170s, WBC was WNL at 6.9 but did have L shift (93% neutrophils). UA non-infectious, RVP negative. CT A/P showed perforated sigmoid diverticulitis with perforation contained in the LLQ, no evidence of abscess,  possible question of cystitis per scan dt mild bladder wall thickening.     REVIEW OF SYSTEMS  All review of systems negative, except for those marked:  General:		[] Abnormal:  	[] Night Sweats		[] Fever		[] Weight Loss  Pulmonary/Cough:	[] Abnormal:  Cardiac/Chest Pain:	[] Abnormal:  Gastrointestinal:	[] Abnormal:  Eyes:			[] Abnormal:  ENT:			[] Abnormal:  Dysuria:		[] Abnormal:  Musculoskeletal	:	[] Abnormal:  Endocrine:		[] Abnormal:  Lymph Nodes:		[] Abnormal:  Headache:		[] Abnormal:  Skin:			[] Abnormal:  Allergy/Immune:	[] Abnormal:  Psychiatric:		[] Abnormal:  [] All other review of systems negative  [] Unable to obtain (explain):    Recent Ill Contacts:	[x] No	[] Yes:  Recent Travel History:	[x] No	[] Yes:  Recent Animal/Insect Exposure/Tick Bites:	[x] No	[] Yes:    Allergies    No Known Allergies    Intolerances      Antimicrobials:  ciprofloxacin   IVPB      ciprofloxacin   IVPB 400 milliGRAM(s) IV Intermittent every 12 hours  metroNIDAZOLE  IVPB 500 milliGRAM(s) IV Intermittent every 12 hours  metroNIDAZOLE  IVPB          Other Medications:  atorvastatin 40 milliGRAM(s) Oral at bedtime  dextrose 5% + sodium chloride 0.45% with potassium chloride 20 mEq/L 1000 milliLiter(s) IV Continuous <Continuous>  enoxaparin Injectable 40 milliGRAM(s) SubCutaneous every 24 hours  finasteride 5 milliGRAM(s) Oral daily  silver sulfADIAZINE 1% Cream 1 Application(s) Topical daily  sodium chloride 0.65% Nasal 1 Spray(s) Both Nostrils four times a day PRN  tamsulosin 0.4 milliGRAM(s) Oral at bedtime      FAMILY HISTORY:  Family hx of colon cancer (Aunt)      PAST MEDICAL & SURGICAL HISTORY:  BPH with elevated PSA      Osteoarthritis      Hyperlipidemia      Anxiety      Cancer of sinus      Malignant neoplasm of nasopharynx      History of chemotherapy      S/P radiation therapy      History of knee replacement procedure of right knee      S/P total knee replacement, left      S/P cataract surgery      H/O colonoscopy      H/O cervical biopsy      History of incisional hernia repair        SOCIAL HISTORY:    IMMUNIZATIONS  [x] Up to Date		[] Not Up to Date:  Recent Immunizations:	[x] No	[] Yes:    Daily     Daily   Head Circumference:  Vital Signs Last 24 Hrs  T(C): 36.9 (01 Jul 2025 13:34), Max: 36.9 (01 Jul 2025 13:34)  T(F): 98.4 (01 Jul 2025 13:34), Max: 98.4 (01 Jul 2025 13:34)  HR: 88 (01 Jul 2025 13:34) (87 - 112)  BP: 127/70 (01 Jul 2025 13:34) (122/74 - 145/65)  BP(mean): 83 (30 Jun 2025 20:00) (83 - 93)  RR: 20 (01 Jul 2025 13:34) (16 - 20)  SpO2: 99% (01 Jul 2025 13:34) (95% - 99%)    Parameters below as of 01 Jul 2025 13:34  Patient On (Oxygen Delivery Method): room air        PHYSICAL EXAM  All physical exam findings normal, except for those marked:  General:	Normal: alert, neither acutely nor chronically ill-appearing, well developed/well   .		nourished, no respiratory distress  .		[] Abnormal:  Eyes		Normal: no conjunctival injection, no discharge, no photophobia, intact   .		extraocular movements, sclera not icteric  .		[] Abnormal:  ENT:		Normal: normal tympanic membranes; external ear normal, nares normal without   .		discharge, no pharyngeal erythema or exudates, no oral mucosal lesions, normal   .		tongue and lips  .		[] Abnormal:  Neck		Normal: supple, full range of motion, no nuchal rigidity  .		[x] Abnormal: Large   Lymph Nodes	Normal: normal size and consistency, non-tender  .		[] Abnormal:  Cardiovascular	Normal: regular rate and variability; Normal S1, S2; No murmur  .		[] Abnormal:  Respiratory	Normal: no wheezing or crackles, bilateral audible breath sounds, no retractions  .		[] Abnormal:  Abdominal	Normal: soft; non-distended; non-tender; no hepatosplenomegaly or masses  .		[] Abnormal:  Extremities	Normal: FROM x4, no cyanosis or edema, symmetric pulses  .		[] Abnormal:  Skin		Normal: skin intact and not indurated; no rash, no desquamation  .		[] Abnormal:  Neurologic	Normal: alert, oriented as age-appropriate, affect appropriate; no weakness, no   .		facial asymmetry, moves all extremities, normal gait-child older than 18 months  .		[] Abnormal:  Musculoskeletal		Normal: no joint swelling, erythema, or tenderness; full range of motion   .			with no contractures; no muscle tenderness; no clubbing; no cyanosis;   .			no edema  .			[] Abnormal    Respiratory Support:		[x] No	[] Yes:  Vasoactive medication infusion:	[x] No	[] Yes:  Venous catheters:		[] No	[x] Yes:  Bladder catheter:		[x] No	[] Yes:  Other catheters or tubes:	[x] No	[] Yes:    Lab Results:                        8.9    5.17  )-----------( 162      ( 01 Jul 2025 05:30 )             26.6     07-01    129[L]  |  94[L]  |  9   ----------------------------<  147[H]  3.0[L]   |  26  |  0.82    Ca    8.6      01 Jul 2025 05:30  Phos  2.5     07-01  Mg     1.80     07-01          Urinalysis Basic - ( 01 Jul 2025 05:30 )    Color: x / Appearance: x / SG: x / pH: x  Gluc: 147 mg/dL / Ketone: x  / Bili: x / Urobili: x   Blood: x / Protein: x / Nitrite: x   Leuk Esterase: x / RBC: x / WBC x   Sq Epi: x / Non Sq Epi: x / Bacteria: x        MICROBIOLOGY:  6/27 Site 1:  Culture - Blood (06.27.25 @ 23:20)    Specimen Source: Blood Blood-Peripheral   Culture Results:   No growth at 72 Hours    6/27 Site 2:  Culture - Blood (06.27.25 @ 23:30)    -  Escherichia coli: Detec   Gram Stain:   Growth in anaerobic bottle: Gram Negative Rods   -  Ampicillin: S <=8 These ampicillin results predict results for amoxicillin   -  Ampicillin/Sulbactam: S <=4/2   -  Aztreonam: S <=4   -  Cefazolin: S <=2   -  Cefepime: S <=2   -  Cefoxitin: S <=8   -  Ceftriaxone: S <=1   -  Ciprofloxacin: S <=0.25   -  Ertapenem: S <=0.5   -  Gentamicin: S <=2   -  Imipenem: S <=1   -  Levofloxacin: S <=0.5   -  Meropenem: S <=1   -  Piperacillin/Tazobactam: S <=8   -  Tigecycline: S <=2 Interpretations based on FDA breakpoints   -  Tobramycin: S <=2   -  Trimethoprim/Sulfamethoxazole: S <=0.5/9.5   Specimen Source: Blood Blood-Peripheral   Organism: Blood Culture PCR   Organism: Escherichia coli    6/29 Site 1:  Culture - Blood (collected 29 Jun 2025 02:00)  Source: Blood Blood  Preliminary Report (01 Jul 2025 11:01):    No growth at 48 Hours  6/29 Site 2:  Culture - Blood (collected 29 Jun 2025 01:00)  Source: Blood Blood-Peripheral  Preliminary Report (01 Jul 2025 11:01):    No growth at 48 Hours        RADIOLOGY:  < from: CT Neck Soft Tissue w/ IV Cont (06.28.25 @ 01:26) >  IMPRESSION:  1.   Complex multi spatial large infiltrating right neck tumor. Necrotic   areas of the tumor noted. Superimposed infection and abscess can not be   excluded.  Increase in right anterolateral and lower facial soft tissue   edema which may be  secondary to a superimposed cellulitis.  2.   Stable appearance of the slightly thickened right side of the   epiglottis. 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 Other   findings as noted.    < from: CT Abdomen and Pelvis w/ IV Cont (06.28.25 @ 01:26) >  IMPRESSION:    1. Severe acute sigmoid colon diverticulitis with perforation. Much of   the perforation is contained in the left lower quadrant with   retroperitoneal gas extending caudally all the way up to the diaphragm.   No abscess.  2. Bladder wall thickening could possibly represent cystitis.      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 ID consulted for an initial bcx that was positive for E Coli.     Pt initially presented to the ED on night of 6/27 after 3 days of crampy abdominal pain associated with nausea. No associated fever, vomiting, diarrhea. In the ED, patient was noted to be febrile to 100.7 and in SVT with HR in 170s, WBC was WNL at 6.9 but did have L shift (93% neutrophils). UA non-infectious, RVP negative. CT A/P showed perforated sigmoid diverticulitis with perforation contained in the LLQ, no evidence of abscess. Pt was started on IV cipro and flagyl which are ongoing (day 3 currently). 1 or 2 bcx drawn in ED grew pan sensitive E Coli. Repeat cultures drawn on 6/29 have shown no growth.    On exam today, pt reports that he has been gradually feeling better over hospital admission. Reports LLQ stomach pain is still present but notably improved compared to first day. Denies fever, chills, HA, SOB, CP, cough, N/V, diarrhea, rashes/lesions while here. Notes mild dysuria and urinary hesitation.         REVIEW OF SYSTEMS  All review of systems negative, except for those marked:   General:		[] Abnormal:  	[] Night Sweats		[] Fever		[] Weight Loss  Pulmonary/Cough:	[] Abnormal:  Cardiac/Chest Pain:	[] Abnormal:  Gastrointestinal:	[x] Abnormal: Per HPI  Eyes:			[] Abnormal:  ENT:			[] Abnormal:  Dysuria:		[] Abnormal:  Musculoskeletal	:	[] Abnormal:  Endocrine:		[] Abnormal:  Lymph Nodes:		[] Abnormal:  Headache:		[] Abnormal:  Skin:			[] Abnormal:  Allergy/Immune:	[] Abnormal:  Psychiatric:		[] Abnormal:  [x] All other review of systems negative  [] Unable to obtain (explain):    Recent Ill Contacts:	[x] No	[] Yes:  Recent Travel History:	[x] No	[] Yes:  Recent Animal/Insect Exposure/Tick Bites:	[x] No	[] Yes:    Allergies    No Known Allergies    Intolerances      Antimicrobials:  ciprofloxacin   IVPB      ciprofloxacin   IVPB 400 milliGRAM(s) IV Intermittent every 12 hours  metroNIDAZOLE  IVPB 500 milliGRAM(s) IV Intermittent every 12 hours  metroNIDAZOLE  IVPB          Other Medications:  atorvastatin 40 milliGRAM(s) Oral at bedtime  dextrose 5% + sodium chloride 0.45% with potassium chloride 20 mEq/L 1000 milliLiter(s) IV Continuous <Continuous>  enoxaparin Injectable 40 milliGRAM(s) SubCutaneous every 24 hours  finasteride 5 milliGRAM(s) Oral daily  silver sulfADIAZINE 1% Cream 1 Application(s) Topical daily  sodium chloride 0.65% Nasal 1 Spray(s) Both Nostrils four times a day PRN  tamsulosin 0.4 milliGRAM(s) Oral at bedtime      FAMILY HISTORY:  Family hx of colon cancer (Aunt)      PAST MEDICAL & SURGICAL HISTORY:  BPH with elevated PSA      Osteoarthritis      Hyperlipidemia      Anxiety      Cancer of sinus      Malignant neoplasm of nasopharynx      History of chemotherapy      S/P radiation therapy      History of knee replacement procedure of right knee      S/P total knee replacement, left      S/P cataract surgery      H/O colonoscopy      H/O cervical biopsy      History of incisional hernia repair        SOCIAL HISTORY:    IMMUNIZATIONS  [x] Up to Date		[] Not Up to Date:  Recent Immunizations:	[x] No	[] Yes:    Daily     Daily   Head Circumference:  Vital Signs Last 24 Hrs  T(C): 36.9 (01 Jul 2025 13:34), Max: 36.9 (01 Jul 2025 13:34)  T(F): 98.4 (01 Jul 2025 13:34), Max: 98.4 (01 Jul 2025 13:34)  HR: 88 (01 Jul 2025 13:34) (87 - 112)  BP: 127/70 (01 Jul 2025 13:34) (122/74 - 145/65)  BP(mean): 83 (30 Jun 2025 20:00) (83 - 93)  RR: 20 (01 Jul 2025 13:34) (16 - 20)  SpO2: 99% (01 Jul 2025 13:34) (95% - 99%)    Parameters below as of 01 Jul 2025 13:34  Patient On (Oxygen Delivery Method): room air        PHYSICAL EXAM  All physical exam findings normal, except for those marked:  General:	Normal: alert, neither acutely nor chronically ill-appearing, well developed/well   .		nourished, no respiratory distress  .		[] Abnormal:  Eyes		Normal: no conjunctival injection, no discharge, no photophobia, intact   .		extraocular movements, sclera not icteric  .		[] Abnormal:  ENT:		Normal: normal tympanic membranes; external ear normal, nares normal without   .		discharge, no pharyngeal erythema or exudates, no oral mucosal lesions, normal   .		tongue and lips  .		[] Abnormal:  Neck		Normal: supple, full range of motion, no nuchal rigidity  .		[x] Abnormal: Large soft growth on R jaw and upper neck - known oropharyngeal carcinoma   Lymph Nodes	Normal: normal size and consistency, non-tender  .		[] Abnormal:  Cardiovascular	Normal: regular rate and variability; Normal S1, S2; No murmur  .		[] Abnormal:  Respiratory	Normal: no wheezing or crackles, bilateral audible breath sounds, no retractions  .		[] Abnormal:  Abdominal	Normal: soft; non-distended; non-tender; no hepatosplenomegaly or masses  .		[] Abnormal: mild distension and tenderness to palpation particularly in LLQ, no rigidity or guarding  Extremities	Normal: FROM x4, no cyanosis or edema, symmetric pulses  .		[] Abnormal:  Skin		Normal: skin intact and not indurated; no rash, no desquamation  .		[] Abnormal:  Neurologic	Normal: alert, oriented as age-appropriate, affect appropriate; no weakness, no   .		facial asymmetry, moves all extremities, normal gait-child older than 18 months  .		[] Abnormal:  Musculoskeletal		Normal: no joint swelling, erythema, or tenderness; full range of motion   .			with no contractures; no muscle tenderness; no clubbing; no cyanosis;   .			no edema  .			[] Abnormal    Respiratory Support:		[x] No	[] Yes:  Vasoactive medication infusion:	[x] No	[] Yes:  Venous catheters:		[] No	[x] Yes:  Bladder catheter:		[x] No	[] Yes:  Other catheters or tubes:	[x] No	[] Yes:    Lab Results:                        8.9    5.17  )-----------( 162      ( 01 Jul 2025 05:30 )             26.6     07-01    129[L]  |  94[L]  |  9   ----------------------------<  147[H]  3.0[L]   |  26  |  0.82    Ca    8.6      01 Jul 2025 05:30  Phos  2.5     07-01  Mg     1.80     07-01          Urinalysis Basic - ( 01 Jul 2025 05:30 )    Color: x / Appearance: x / SG: x / pH: x  Gluc: 147 mg/dL / Ketone: x  / Bili: x / Urobili: x   Blood: x / Protein: x / Nitrite: x   Leuk Esterase: x / RBC: x / WBC x   Sq Epi: x / Non Sq Epi: x / Bacteria: x        MICROBIOLOGY:  6/27 Site 1:  Culture - Blood (06.27.25 @ 23:20)    Specimen Source: Blood Blood-Peripheral   Culture Results:   No growth at 72 Hours    6/27 Site 2:  Culture - Blood (06.27.25 @ 23:30)    -  Escherichia coli: Detec   Gram Stain:   Growth in anaerobic bottle: Gram Negative Rods   -  Ampicillin: S <=8 These ampicillin results predict results for amoxicillin   -  Ampicillin/Sulbactam: S <=4/2   -  Aztreonam: S <=4   -  Cefazolin: S <=2   -  Cefepime: S <=2   -  Cefoxitin: S <=8   -  Ceftriaxone: S <=1   -  Ciprofloxacin: S <=0.25   -  Ertapenem: S <=0.5   -  Gentamicin: S <=2   -  Imipenem: S <=1   -  Levofloxacin: S <=0.5   -  Meropenem: S <=1   -  Piperacillin/Tazobactam: S <=8   -  Tigecycline: S <=2 Interpretations based on FDA breakpoints   -  Tobramycin: S <=2   -  Trimethoprim/Sulfamethoxazole: S <=0.5/9.5   Specimen Source: Blood Blood-Peripheral   Organism: Blood Culture PCR   Organism: Escherichia coli    6/29 Site 1:  Culture - Blood (collected 29 Jun 2025 02:00)  Source: Blood Blood  Preliminary Report (01 Jul 2025 11:01):    No growth at 48 Hours  6/29 Site 2:  Culture - Blood (collected 29 Jun 2025 01:00)  Source: Blood Blood-Peripheral  Preliminary Report (01 Jul 2025 11:01):    No growth at 48 Hours        RADIOLOGY:  < from: CT Neck Soft Tissue w/ IV Cont (06.28.25 @ 01:26) >  IMPRESSION:  1.   Complex multi spatial large infiltrating right neck tumor. Necrotic   areas of the tumor noted. Superimposed infection and abscess can not be   excluded.  Increase in right anterolateral and lower facial soft tissue   edema which may be  secondary to a superimposed cellulitis.  2.   Stable appearance of the slightly thickened right side of the   epiglottis. 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 Other   findings as noted.    < from: CT Abdomen and Pelvis w/ IV Cont (06.28.25 @ 01:26) >  IMPRESSION:    1. Severe acute sigmoid colon diverticulitis with perforation. Much of   the perforation is contained in the left lower quadrant with   retroperitoneal gas extending caudally all the way up to the diaphragm.   No abscess.  2. Bladder wall thickening could possibly represent cystitis.

## 2025-07-02 NOTE — CHART NOTE - NSCHARTNOTEFT_GEN_A_CORE
RD consulted for bolus tube feeds recommendation. RD informed team that patient was recently seen and recommendation for TF provided on 6/30. Recommendation to order home bolus regimen of Sherie Jaramillo Standard 1.4, 325 mL bolus, every 6 hrs. RD remains available, team made aware. Dell Dutta MS, CDN, RDN  Pager #[phone]

## 2025-07-02 NOTE — PROGRESS NOTE ADULT - ASSESSMENT
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 Had SVT which is being managed with vagal maneuvres. GNR in blood 6/27  Patient overall doing much better, clinically much improved.   Plan:  - C/W TF, will convert to bolus feed today  - D/C IVF   -Appreciated ID recommendation for antibiotic :      CW Cipro/flagyl. Will d/w pharmacy for liquid formulation of form to give via FT.        A Team Surgery   [id number]

## 2025-07-02 NOTE — PROGRESS NOTE ADULT - SUBJECTIVE AND OBJECTIVE BOX
TEAM [ A ] Surgery Daily Progress Note  =====================================================    SUBJECTIVE: Patient seen and examined at bedside on AM rounds. Patient is A&OX 3, appear comfortable and converse appropriately with linear thoughts. Patient reports feeling well, pain controlled on current regimen. Tolerating diet, denies nausea, vomiting. +   Flatus/ +   BM. OOB/Amublating as tolerated. Denies fever, chills.    ALLERGIES:  No Known Allergies      --------------------------------------------------------------------------------------    MEDICATIONS:    Neurologic Medications  melatonin 3 milliGRAM(s) Oral at bedtime    Respiratory Medications    Cardiovascular Medications    Gastrointestinal Medications  potassium chloride   Solution 40 milliEquivalent(s) Enteral Tube once    Genitourinary Medications  tamsulosin 0.4 milliGRAM(s) Oral at bedtime    Hematologic/Oncologic Medications  enoxaparin Injectable 40 milliGRAM(s) SubCutaneous every 24 hours    Antimicrobial/Immunologic Medications  ciprofloxacin   IVPB      ciprofloxacin   IVPB 400 milliGRAM(s) IV Intermittent every 12 hours  metroNIDAZOLE  IVPB 500 milliGRAM(s) IV Intermittent every 12 hours  metroNIDAZOLE  IVPB        Endocrine/Metabolic Medications  atorvastatin 40 milliGRAM(s) Oral at bedtime  finasteride 5 milliGRAM(s) Oral daily    Topical/Other Medications  silver sulfADIAZINE 1% Cream 1 Application(s) Topical daily  sodium chloride 0.65% Nasal 1 Spray(s) Both Nostrils four times a day PRN Nasal Congestion    --------------------------------------------------------------------------------------    VITAL SIGNS:  T(C): 36.7 (07-01-25 @ 21:51), Max: 36.9 (07-01-25 @ 13:34)  HR: 98 (07-01-25 @ 21:51) (88 - 98)  BP: 126/76 (07-01-25 @ 21:51) (126/76 - 131/67)  RR: 20 (07-01-25 @ 21:51) (20 - 20)  SpO2: 96% (07-01-25 @ 21:51) (96% - 99%)  --------------------------------------------------------------------------------------    EXAM    General: NAD, resting in bed comfortably.  Cardiac: regular rate, warm and well perfused  Respiratory: Nonlabored respirations, normal cw expansion.  Abdomen: soft, nontender, nondistended.   Extremities:, no deformities    --------------------------------------------------------------------------------------    LABS                          9.4    7.11  )-----------( 178      ( 02 Jul 2025 05:30 )             28.1   07-02    135  |  96[L]  |  8   ----------------------------<  134[H]  3.3[L]   |  24  |  0.82    Ca    8.6      02 Jul 2025 05:30  Phos  3.1     07-02  Mg     1.80     07-02      --------------------------------------------------------------------------------------    INS AND OUTS:    07-01-25 @ 07:01  -  07-02-25 @ 07:00  --------------------------------------------------------  IN: 1770 mL / OUT: 930 mL / NET: 840 mL      --------------------------------------------------------------------------------------

## 2025-07-02 NOTE — CONSULT NOTE ADULT - ASSESSMENT
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

## 2025-07-02 NOTE — CHART NOTE - NSCHARTNOTEFT_GEN_A_CORE
Report by Radiology Tech and Radiologist patient with R forearm IV contrast extravasation.   Arm exam: + mild localize swelling, NT  Compartments are soft, FROM on elbow and wrist.   Pulses intact, sensation intact.  Instructed Team and RN to keep arm elevate and monitor for compartment syndrome.    Surgery A Team

## 2025-07-02 NOTE — CONSULT NOTE ADULT - SUBJECTIVE AND OBJECTIVE BOX
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 Had SVT which is being managed with vagal maneuvers. Surgical rapid response called for Afib with RVR.      PMH:  PAST MEDICAL & SURGICAL HISTORY:  BPH with elevated PSA      Osteoarthritis      Hyperlipidemia      Anxiety      Cancer of sinus      Malignant neoplasm of nasopharynx      History of chemotherapy      S/P radiation therapy      History of knee replacement procedure of right knee      S/P total knee replacement, left      S/P cataract surgery      H/O colonoscopy      H/O cervical biopsy      History of incisional hernia repair          ALLERGIES:  No Known Allergies      --------------------------------------------------------------------------------------    MEDICATIONS:    Neurologic Medications  fentaNYL    Injectable 25 MICROGram(s) IV Push every 5 minutes PRN Moderate Pain (4 - 6)  fentaNYL    Injectable 50 MICROGram(s) IV Push every 15 minutes PRN Severe Pain (7 - 10)  ondansetron Injectable 4 milliGRAM(s) IV Push once PRN Nausea and/or Vomiting    Respiratory Medications    Cardiovascular Medications    Gastrointestinal Medications  lactated ringers. 1000 milliLiter(s) IV Continuous <Continuous>    Genitourinary Medications    Hematologic/Oncologic Medications    Antimicrobial/Immunologic Medications    Endocrine/Metabolic Medications  insulin lispro (ADMELOG) corrective regimen sliding scale   SubCutaneous every 6 hours    Topical/Other Medications  chlorhexidine 4% Liquid 1 Application(s) Topical daily    --------------------------------------------------------------------------------------    VITAL SIGNS:  T(C): 37.4 (07-02-25 @ 13:00), Max: 37.4 (07-02-25 @ 13:00)  HR: 82 (07-02-25 @ 14:55) (82 - 181)  BP: 126/78 (07-02-25 @ 14:55) (121/80 - 138/62)  RR: 24 (07-02-25 @ 14:50) (18 - 24)  SpO2: 98% (07-02-25 @ 14:55) (92% - 100%)  --------------------------------------------------------------------------------------    INS AND OUTS:    07-01-25 @ 07:01  -  07-02-25 @ 07:00  --------------------------------------------------------  IN: 1770 mL / OUT: 930 mL / NET: 840 mL    07-02-25 @ 07:01  -  07-02-25 @ 16:59  --------------------------------------------------------  IN: 300 mL / OUT: 400 mL / NET: -100 mL      --------------------------------------------------------------------------------------    EXAM    NEURO: NAD,   IBRAHIMAENT: NC/AT  RESPIRATORY: nonlabored respirations, normal CW expansion  CARDIO: RRR, S1S2  ABDOMEN: soft, nontender, nondistended, +/- NGT, ostomy, surgical dressing c/d/i, UMEHS drain output  EXTREMITIES: normal strength, no deformities    --------------------------------------------------------------------------------------    LABS                         10.0   8.82  )-----------( 196 ( 02 Jul 2025 16:30 )             29.9   07-02    135  |  96[L]  |  8   ----------------------------<  134[H]  3.3[L]   |  24  |  0.82    Ca    8.6      02 Jul 2025 05:30  Phos  3.1     07-02  Mg     1.80     07-02      -------------------------------------------------------------------------------------- SICU Consult Note    HPI: 75y M no known cardiac hx, with 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 While in SICU had multiple episodes of SVT, most recently on day of downgrade (6/30) but it resolved without intervention. Since day of downgrade pt has had improving abdominal pain, and normalization of WBC.    Rapid Response: On 7/2 rapid response was called for tachycardia to 180s seen on telemetry with associate chest pressure endorsed by patient. During rapid response max HR was 109 and EKG showed sinus tachycardia, rectal Tmax: 101.3 SBP in 140s and SpO2 96 on RA. Primary team gave Tylenol 1L bolus, and sent full set of labs including cardiac enzymes with plan to do repeat CT scan.     PMH:  PAST MEDICAL & SURGICAL HISTORY:  BPH with elevated PSA      Osteoarthritis      Hyperlipidemia      Anxiety      Cancer of sinus      Malignant neoplasm of nasopharynx      History of chemotherapy      S/P radiation therapy      History of knee replacement procedure of right knee      S/P total knee replacement, left      S/P cataract surgery      H/O colonoscopy      H/O cervical biopsy      History of incisional hernia repair          ALLERGIES:  No Known Allergies      --------------------------------------------------------------------------------------    MEDICATIONS:    Neurologic Medications  fentaNYL    Injectable 25 MICROGram(s) IV Push every 5 minutes PRN Moderate Pain (4 - 6)  fentaNYL    Injectable 50 MICROGram(s) IV Push every 15 minutes PRN Severe Pain (7 - 10)  ondansetron Injectable 4 milliGRAM(s) IV Push once PRN Nausea and/or Vomiting    Respiratory Medications    Cardiovascular Medications    Gastrointestinal Medications  lactated ringers. 1000 milliLiter(s) IV Continuous <Continuous>    Genitourinary Medications    Hematologic/Oncologic Medications    Antimicrobial/Immunologic Medications    Endocrine/Metabolic Medications  insulin lispro (ADMELOG) corrective regimen sliding scale   SubCutaneous every 6 hours    Topical/Other Medications  chlorhexidine 4% Liquid 1 Application(s) Topical daily    --------------------------------------------------------------------------------------    VITAL SIGNS:  T(C): 37.4 (07-02-25 @ 13:00), Max: 37.4 (07-02-25 @ 13:00)  HR: 82 (07-02-25 @ 14:55) (82 - 181)  BP: 126/78 (07-02-25 @ 14:55) (121/80 - 138/62)  RR: 24 (07-02-25 @ 14:50) (18 - 24)  SpO2: 98% (07-02-25 @ 14:55) (92% - 100%)  --------------------------------------------------------------------------------------    INS AND OUTS:    07-01-25 @ 07:01  -  07-02-25 @ 07:00  --------------------------------------------------------  IN: 1770 mL / OUT: 930 mL / NET: 840 mL    07-02-25 @ 07:01  -  07-02-25 @ 16:59  --------------------------------------------------------  IN: 300 mL / OUT: 400 mL / NET: -100 mL      --------------------------------------------------------------------------------------    EXAM    NEURO: NAD,   HEENT: NC/AT  RESPIRATORY: nonlabored respirations, normal CW expansion  CARDIO: RRR, S1S2  ABDOMEN: soft, nontender, nondistended, +/- NGT, ostomy, surgical dressing c/d/i, UMESH drain output  EXTREMITIES: normal strength, no deformities    --------------------------------------------------------------------------------------    LABS                         10.0   8.82  )-----------( 196 ( 02 Jul 2025 16:30 )             29.9   07-02    135  |  96[L]  |  8   ----------------------------<  134[H]  3.3[L]   |  24  |  0.82    Ca    8.6      02 Jul 2025 05:30  Phos  3.1     07-02  Mg     1.80     07-02      --------------------------------------------------------------------------------------

## 2025-07-02 NOTE — CONSULT NOTE ADULT - PROBLEM SELECTOR RECOMMENDATION 9
secondary to perforated diverticulitis   resolved  ID help appreciated  will continue antibiotics   currently on Cipro and Flagyl

## 2025-07-02 NOTE — CONSULT NOTE ADULT - SUBJECTIVE AND OBJECTIVE BOX
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 He reports the symptoms began on Tuesday while at chemotherapy with crampy abdominal pain associated with nausea. He has been otherwise tolerating PEG tube feeds. Denies fevers at home. No diarrhea. Never had similar episodes. Had a colonoscopy 5 years ago which showed benign polyps. In the ED, patient was noted to be febrile to 100.7 and in SVT with HR in 170s. Given beta blockers, antibiotics and IVF resuscitation with improvement. The patient was management and treated in SICU for likely sepsis secondary to GNR bacteremia and is now transferred to floor      PAST MEDICAL & SURGICAL HISTORY:  BPH with elevated PSA      Osteoarthritis      Hyperlipidemia      Anxiety      Cancer of sinus      Malignant neoplasm of nasopharynx      History of chemotherapy      S/P radiation therapy      History of knee replacement procedure of right knee      S/P total knee replacement, left      S/P cataract surgery      H/O colonoscopy      H/O cervical biopsy      History of incisional hernia repair          Review of Systems:   not available   son at bedside (Arun)    Allergies    No Known Allergies        Social History: non smoker  no IVDA  no ETOH abuse   lives with family     FAMILY HISTORY:  Family hx of colon cancer (Aunt)        MEDICATIONS  (STANDING):  atorvastatin 40 milliGRAM(s) Oral at bedtime  ciprofloxacin   IVPB      ciprofloxacin   IVPB 400 milliGRAM(s) IV Intermittent every 12 hours  enoxaparin Injectable 40 milliGRAM(s) SubCutaneous every 24 hours  finasteride 5 milliGRAM(s) Oral daily  magnesium sulfate  IVPB 1 Gram(s) IV Intermittent once  melatonin 3 milliGRAM(s) Oral at bedtime  metroNIDAZOLE  IVPB 500 milliGRAM(s) IV Intermittent every 12 hours  metroNIDAZOLE  IVPB      potassium chloride   Solution 40 milliEquivalent(s) Enteral Tube once  silver sulfADIAZINE 1% Cream 1 Application(s) Topical daily  tamsulosin 0.4 milliGRAM(s) Oral at bedtime    MEDICATIONS  (PRN):  sodium chloride 0.65% Nasal 1 Spray(s) Both Nostrils four times a day PRN Nasal Congestion      CAPILLARY BLOOD GLUCOSE        I&O's Summary    01 Jul 2025 07:01  -  02 Jul 2025 07:00  --------------------------------------------------------  IN: 1770 mL / OUT: 930 mL / NET: 840 mL        24hrs Vital:  T(C): 36.7 (07-01-25 @ 21:51), Max: 36.9 (07-01-25 @ 13:34)  HR: 98 (07-01-25 @ 21:51) (88 - 98)  BP: 126/76 (07-01-25 @ 21:51) (126/76 - 131/67)  RR: 20 (07-01-25 @ 21:51) (20 - 20)  SpO2: 96% (07-01-25 @ 21:51) (96% - 99%)    PHYSICAL EXAM:  EYES: EOMI, PERRLA, conjunctiva and sclera clear  NECK: Supple, No JVD extensive chronic induration and erythema right neck  CHEST/LUNG: Clear to auscultation bilaterally; No wheeze  HEART: S1S2; No rubs, or gallops, no murmurs  ABDOMEN: Soft, Nontender; Bowel sounds present  PEG in place  EXTREMITIES:  + Peripheral Pulses, No clubbing or cyanosis, no edema  NEUROLOGY: Alert confused , no focal motor or sensory deficits      LABS:                        9.4    7.11  )-----------( 178      ( 02 Jul 2025 05:30 )             28.1     07-02    135  |  96[L]  |  8   ----------------------------<  134[H]  3.3[L]   |  24  |  0.82    Ca    8.6      02 Jul 2025 05:30  Phos  3.1     07-02  Mg     1.80     07-02            Urinalysis Basic - ( 02 Jul 2025 05:30 )    Color: x / Appearance: x / SG: x / pH: x  Gluc: 134 mg/dL / Ketone: x  / Bili: x / Urobili: x   Blood: x / Protein: x / Nitrite: x   Leuk Esterase: x / RBC: x / WBC x   Sq Epi: x / Non Sq Epi: x / Bacteria: x        RADIOLOGY & ADDITIONAL TESTS:    Consultant(s) Notes Reviewed:      Care Discussed with Consultants/Other Providers:

## 2025-07-02 NOTE — CONSULT NOTE ADULT - ATTENDING COMMENTS
Agree with above. Pt febrile with known diverticulitis, otherwise hemodynamically stable. No indication for SICU admission at this time. Will follow work up and reeval as needed.     The Acute Care Surgery (B Team) Attending Group Practice:  Dr. Mone Cain  [id number]
Sepsis secondary to perforated diverticulitis  a.  Admit to SICU.  Accepted by overnight attending, transferred to SICU this am  b.  CT reviewed by me and official read by radiologist,  c.  On empiric IV ciprofloxacin and flagyl    Sinus tachycardia r/p atrial arrhythmia  a.  EKG reviewed  b.  Discontinue empiric IV lopressor  c.  Obtain cardiac enzymes  d.  Repeat EKG this am    Malnutrition  a.  Remains NPO  b.  PEG in place  c.  Discuss with primary service re: timing of feed advancement    History of NPCA  a.  Recent chemotherapy.  Obtain heme/o ncoloy consult pen  b.  Silvadene to necrotic neck wound.  Discussed with Dr. Romo at bedside
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  CT w/ severe acute sigmoid colon diverticulitis with perforation, no abscess.   Pt admitted to SICU, started on Cipro/flagyl with improvement, transferred to floors on 6/30.     BCx w/ pan sensitive E Coli in 1/2 BCx, repeat negative.     #E Coli bacteremia 2/2 perforated diverticulitis   #Nasopharyngeal carcinoma on chemotherapy     Recommendations:   1. Ceftriaxone 2 g q24, Flagyl 500 mg BID while inpatient, on d/c, Cipro 500 mg BID, Flagyl 500 mg BID for total 14 days (EOT 7/11)  If planned for d/c soon, can c/w cipro/flagyl inpatient     Thank you for consulting us and involving us in the management of this patient's case. In addition to reviewing history, imaging, documents, labs, microbiology, and infection control strategies and potential issues.     Inpatient ID consult team will sign off.    Further changes in lab values, imaging studies, or clinical status will not be known to ID inpatient consultants unless specifically communicated by primary team.    Rell Reyes MD  Attending Physician  Division of Infectious Diseases  Department of Medicine    Please contact through MS Teams message.  Office: [phone] (after 5 PM or weekend)
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 hold chemotherapy while inpatient and diverticulitis is undergoing medical management.  Tyler Daly MD PhD  Oncology Attending

## 2025-07-02 NOTE — CONSULT NOTE ADULT - ASSESSMENT
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 Had SVT which is being managed with vagal maneuvers. SICU consulted for tachycardia.    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 Recently admitted to SICU 6/28-6/30 for hemodynamic monitoring. Rapid response called and SICU reconsulted on 6/30 for episodes of tachycardia to 180s seen on tele with concurrent chest pressure. During rapid response max HR was 109 and EKG showed sinus tachycardia, rectal Tmax: 101.3 other vitals WNL.     Plan  - agree with plan per primary team - 1L bolus and tylenol given, full set of labs including cardiac enzymes sent and repeat CT to be done   - f/u repeat labs and CT   - Please reconsult SICU if clinical condition worsens     Plan discussed with attending Dr. Cain   SICU  Spectra:[phone]

## 2025-07-02 NOTE — RAPID RESPONSE TEAM SUMMARY - NSSITUATIONBACKGROUNDRRT_GEN_ALL_CORE
Leukocytosis - unknown etiology  LA 1.2 and no fever  UA - no signs of infections  CXR - clear   CT of abdomen  Trend CBC - WBC normal range and no bandemia on repeat - with IVF     75M hx Retropharyngeal Ca on Chemo, admitted with perforated diverticulitis. RRT called for Tachycardia aw Chest Pain.     Pt Normotensive on arrival w -115. 95% on RA. Temp 101.6 F. Awake, alert, following commands.     ECG sinus tach. CXR w grossly nml lungs.     Given cf mild sepsis, pt administered 1L Crystalloid, Tylenol. STAT labs obtained, including Lactate + Blood Cx x2. CT A/P ordered. Abx changed to CTX/Flagyl, per ID.     SICU consulted, but pt to remain on floor for now.     Case discussed w Dr. Billingsley.  75M hx Retropharyngeal Ca on Chemo, admitted with perforated diverticulitis and E Coli bacteremia. RRT called for Tachycardia aw Chest Pain.     Pt Normotensive on arrival w -115. 95% on RA. Temp 101.6 F. Awake, alert, following commands.     ECG sinus tach. CXR w grossly nml lungs.     Given cf mild sepsis, pt administered 1L Crystalloid, Tylenol. STAT labs obtained, including Lactate + Blood Cx x2. CT A/P ordered. Abx changed to CTX/Flagyl, per ID.     SICU consulted, but pt to remain on floor for now.     Case discussed w Dr. Billingsley.

## 2025-07-03 ENCOUNTER — NON-APPOINTMENT (OUTPATIENT)
Age: 75
End: 2025-07-03

## 2025-07-03 NOTE — CHART NOTE - NSCHARTNOTEFT_GEN_A_CORE
PRE-INTERVENTIONAL RADIOLOGY PROCEDURE NOTE      Patient Age: 75    Patient Gender: M    Procedure: Drainage of intra abdominal abscess    Diagnosis/Indication: Abscess    Interventional Radiology Attending Physician: Patrick    Ordering Attending Physician: Estefany Curry/ Jose Cruz    Pertinent Medical History: Nasopharyngeal CA, immunocompromise, Perf. Diverticulitis, Abscess    Pertinent labs:                      10.5   8.72  )-----------( 172      ( 03 Jul 2025 05:45 )             32.5       07-03    137  |  97[L]  |  7   ----------------------------<  100[H]  3.0[L]   |  26  |  0.82    Ca    8.8      03 Jul 2025 05:45  Phos  3.0     07-03  Mg     1.90     07-03        PT/INR - ( 02 Jul 2025 16:30 )   PT: 13.0 sec;   INR: 1.12 ratio         PTT - ( 02 Jul 2025 16:30 )  PTT:28.6 sec        Patient and Family Aware ? Yes

## 2025-07-03 NOTE — PROGRESS NOTE ADULT - ASSESSMENT
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    Assessment:  #E Coli bacteremia 2/2 perforated diverticulitis   #Nasopharyngeal carcinoma on chemotherapy   -CT w/ severe acute sigmoid colon diverticulitis with perforation, no abscess.   -Pt admitted to SICU, started on Cipro/flagyl with improvement, transferred to floors on 6/30.   -BCX 6/27 w/ pan sensitive E Coli in 1/2 BCx, repeat BCX 6/29 negative.   -RRT 7/2 for CP and tachycardia, pt found to have temp of 101.3. Initially on Ceftriaxone and Flagyl, switched to Zosyn during RRT  -Repeat CT A/P 7/2 showing Severe colonic diverticulitis with suspected perforation as well as abscess formation which appears slightly worse compared to prior CT.    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    Assessment:  #E Coli bacteremia 2/2 perforated diverticulitis   #Nasopharyngeal carcinoma on chemotherapy   -CT w/ severe acute sigmoid colon diverticulitis with perforation, no abscess.   -Pt admitted to SICU, started on Cipro/flagyl with improvement, transferred to floors on 6/30.   -BCX 6/27 w/ pan sensitive E Coli in 1/2 BCx, repeat BCX 6/29 negative.   -RRT 7/2 for CP and tachycardia, pt found to have temp of 101.3. Initially on Ceftriaxone and Flagyl, switched to Zosyn during RRT  -Repeat CT A/P 7/2 showing Severe colonic diverticulitis with suspected perforation as well as abscess formation which appears slightly worse compared to prior CT.    Recommendation:  -Cont Zosyn for now  -F/U BCX  -IR consulted, plan for diverticular abscess drainage today. F/U IR cx   -Appreciate surgery recs for perforated diverticulitis   -Cont to monitor fever and WBC curve    Jose Wallace  ID Fellow

## 2025-07-03 NOTE — CONSULT NOTE ADULT - ASSESSMENT
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 IR consulted for diverticular abscess drainage.     Allergies: No Known Allergies    Medications (Abx/Cardiac/Anticoagulation/Blood Products)    cefTRIAXone   IVPB: 100 mL/Hr IV Intermittent (07-02 @ 16:47)  ciprofloxacin   IVPB: 200 mL/Hr IV Intermittent (07-02 @ 05:45)  enoxaparin Injectable: 40 milliGRAM(s) SubCutaneous (07-03 @ 11:09)  metroNIDAZOLE  IVPB: 100 mL/Hr IV Intermittent (07-02 @ 05:44)  piperacillin/tazobactam IVPB.: 200 mL/Hr IV Intermittent (07-02 @ 20:47)  piperacillin/tazobactam IVPB.-: 25 mL/Hr IV Intermittent (07-02 @ 23:39)  piperacillin/tazobactam IVPB..: 25 mL/Hr IV Intermittent (07-03 @ 05:42)    Data:    T(C): 38  HR: 96  BP: 130/76  RR: 20  SpO2: 100%    -WBC 8.72 / HgB 10.5 / Hct 32.5 / Plt 172  -Na 137 / Cl 97 / BUN 7 / Glucose 100  -K 3.0 / CO2 26 / Cr 0.82  -ALT -- / Alk Phos -- / T.Bili --  -INR 1.12 / PTT 28.6      Radiology: CT AP     Assessment/Plan:   74 y/o M w/ 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 IR consulted for diverticular abscess drainage.       -- IR will plan to perform diverticular abscess drainage on 7/3  -- NPO STAT  -- hold anticoagulation STAT  -- please complete IR pre-procedure note  -- please place IR procedure request order under Dr. Nguyen    --  Flakito Arredondo MD, PGY-3 Resident   Vascular and Interventional Radiology   Available on Microsoft Teams    - Non-emergent consults: Place IR consult order in [place]  - Emergent issues (pager): [facility] [phone]; [facility] [phone]; [id number]  - Scheduling questions: [facility] [phone]; [facility] [phone]  - Clinic/outpatient booking: [facility] [phone]; [facility] [phone]

## 2025-07-03 NOTE — PROCEDURE NOTE - PROCEDURE FINDINGS AND DETAILS
CT guided drain placement of a llq diverticular abscess.  Drain set to UMESH bulb.  Patient tolerated procedure well without immediate complications

## 2025-07-03 NOTE — PRE PROCEDURE NOTE - RECENT H&P AUTHOR/LOCATION
Subjective   Leticia Arreguin is a 66 y.o. female who presents to [facility]. Her former PCP was Dr. Contreras.     Chief Complaint   Patient presents with   • [facility]     Previous PCP Dr. Contreras, loss of hearing-tinnitis, different parts of body will turn white       HPI:   The last couple of months she has had intermittent white appearance of different fingers or toes at which times the digits will also be cold to touch. No new arthralgias or myalgias in recent months. She does have occasional arthralgias of the fingers and often has swelling of the fingers.  She has been taking Diurex each morning due to generalized edema involving the hands, feet and lower legs. She had better control of edema previously with triamterene/hctz but never sought refills.  She denies chest pain, dyspnea, orthopnea, palpitations, weakness, visual disturbances or confusion.     She has had bothersome tinnitus of both ears for many years.  She also reports bilateral hearing loss, more prominent in the left. She often has trouble with aching in the left ear which she has been told is due to persistent otitis media with effusion. No ear discharge.     She has experienced chronic fatigue due to insomnia long term. She has previously tried and failed Ambien, benzodiazepines, Remeron, hydroxyzine and other sleep medications. She last took Flexeril which helped but she ran out of refills. She does experience headaches which she feels are due to fatigue.     She is bothered by weight gain of around 20 pounds over the last 3 years. She walks at least 5 miles a day, hikes often, swims frequently and eats very few carbohydrates. Intermittent fasting did not help.     Generalized itching long-term without rash. Cause unknown.     Frequent GERD. Last EGD was a couple of years ago. She has been diagnosed with hiatal hernia.  Omeprazole 20 mg daily helps symptoms somewhat but not enough. She has also had some bothersome bloating which Gas-X  prn helps.         The following portions of the patient's history were reviewed and updated as appropriate: She  has a past medical history of Anxiety, Asthma, Colonic polyp, Depression, Diverticulosis, Dyslipidemia, Electrocardiogram finding, abnormal, without diagnosis, Elevated LFTs, Esophageal reflux, Fall, Female pelvic pain, Fibromyalgia, Fracture, GERD (gastroesophageal reflux disease), Gestational diabetes, H/O mammogram, Headache, History of blood transfusion, History of Holter monitoring, Hypertension, Insomnia, Joint pain, Migraine, Osteoarthritis, Ovarian cyst, Pain in joint of left hip, PTSD (post-traumatic stress disorder), Recurrent UTI (urinary tract infection), Rheumatic fever, Shoulder sprain, Swelling of ankle joint, Tinnitus, Vaginitis, Vitamin B12 deficiency, and Vitamin D deficiency disease.  She does not have any pertinent problems on file.  She  has a past surgical history that includes Appendectomy; Cholecystectomy; Colonoscopy; Dilation and curettage, diagnostic / therapeutic; Total hip arthroplasty (Bilateral); Tonsillectomy; Wrist surgery; Hip surgery; Abdominal surgery; Fracture surgery; Joint replacement; Electroconvulsive therapy (Bilateral, 10/6/2016); Electroconvulsive therapy (Bilateral, 10/11/2016); Electroconvulsive therapy (Bilateral, 10/17/2016); Electroconvulsive therapy (Bilateral, 10/28/2016); Electroconvulsive therapy (Bilateral, 11/1/2016); Electroconvulsive therapy (Bilateral, 11/3/2016); and Total hip arthroplasty (2004,2011,2015).  Her family history includes Arthritis in her father, mother, and another family member; Breast cancer in her sister; Cancer in her mother, sister, and another family member; Depression in her father and mother; Diabetes in an other family member; Hyperlipidemia in her father; Hypertension in her father; Lupus in her father; Mental illness in her father and mother; Migraines in her mother and sister; Osteoporosis in her father; Ovarian cancer  Jose Cruz admit H&P in her mother; Rheum arthritis in her sister; Seizures in her father; Stomach cancer in her mother; Stroke in her father.  She  reports that she has never smoked. She has never used smokeless tobacco. She reports that she does not drink alcohol or use drugs.    Current Outpatient Medications   Medication Sig Dispense Refill   • estradiol (ESTRING) 2 MG vaginal ring Insert one ring into the vagina every three months. 1 each 4   • Glucosamine-Chondroitin (OSTEO BI-FLEX REGULAR STRENGTH PO) Take  by mouth.     • Melatonin-Pyridoxine (MELATIN PO) Take  by mouth Every Night.     • Multiple Vitamins-Minerals (MULTIVITAMIN ADULT EXTRA C PO) multivitamin   1 po QDay     • Probiotic Product (PROBIOTIC-10) capsule Take  by mouth.     • omeprazole (priLOSEC) 40 MG capsule Take 1 capsule by mouth Daily. 90 capsule 1   • triamterene-hydrochlorothiazide (MAXZIDE-25) 37.5-25 MG per tablet Take 0.5 tablets by mouth Daily. 45 tablet 3     No current facility-administered medications for this visit.        Review of Systems  Review of Systems   Constitutional: Positive for fatigue and unexpected weight gain. Negative for activity change, appetite change, chills, diaphoresis, fever and unexpected weight loss.        Malaise   HENT: Positive for ear pain, hearing loss and tinnitus. Negative for congestion, dental problem, mouth sores, postnasal drip, rhinorrhea, sinus pressure, sneezing, sore throat, swollen glands, trouble swallowing and voice change.    Eyes: Negative for blurred vision, double vision, pain, discharge, redness, itching and visual disturbance.   Respiratory: Negative for cough, choking, chest tightness, shortness of breath and wheezing.    Cardiovascular: Positive for leg swelling. Negative for chest pain and palpitations.        Fast heart rate at times.     Gastrointestinal: Positive for abdominal pain, GERD and indigestion. Negative for abdominal distention, blood in stool, constipation, diarrhea, nausea, rectal  "pain and vomiting.   Endocrine: Negative for cold intolerance, heat intolerance, polydipsia, polyphagia and polyuria.   Genitourinary: Negative for breast discharge, breast lump, breast pain, decreased libido, decreased urine volume, difficulty urinating, dysuria, flank pain, frequency, hematuria, pelvic pain, urgency, vaginal bleeding and vaginal pain.   Musculoskeletal: Positive for arthralgias, joint swelling and myalgias. Negative for back pain, gait problem and neck pain.   Skin: Positive for color change (with cold). Negative for rash and skin lesions.   Allergic/Immunologic: Negative for environmental allergies and immunocompromised state.   Neurological: Positive for headache. Negative for dizziness, tremors, facial asymmetry, speech difficulty, weakness, light-headedness, numbness, memory problem and confusion.   Hematological: Negative for adenopathy. Does not bruise/bleed easily.   Psychiatric/Behavioral: Positive for dysphoric mood and sleep disturbance. Negative for agitation, behavioral problems, decreased concentration, hallucinations, self-injury, suicidal ideas, negative for hyperactivity, depressed mood and stress. The patient is nervous/anxious.          Objective    /68   Pulse 75   Temp 97.1 °F (36.2 °C)   Resp 16   Ht 175.3 cm (69\")   Wt 70.4 kg (155 lb 3.2 oz)   SpO2 99%   BMI 22.92 kg/m²   Physical Exam   Constitutional: She is oriented to person, place, and time. She appears well-developed and well-nourished. No distress.   HENT:   Head: Normocephalic and atraumatic.   Mild bilateral OME.    Eyes: Pupils are equal, round, and reactive to light. Conjunctivae and EOM are normal.   Neck: Normal range of motion. Neck supple. Thyromegaly present.   Cardiovascular: Normal rate, regular rhythm and normal heart sounds. Exam reveals no gallop and no friction rub.   No murmur heard.  Pulmonary/Chest: Effort normal and breath sounds normal. No respiratory distress. She has no wheezes. She " has no rales.   Abdominal: Soft. Bowel sounds are normal. There is generalized tenderness.   Musculoskeletal: Normal range of motion. She exhibits edema (trace to 1+ edema bilateral lower legs and hands). She exhibits no tenderness or deformity.   Lymphadenopathy:     She has no cervical adenopathy.   Neurological: She is alert and oriented to person, place, and time. She displays normal reflexes. No cranial nerve deficit or sensory deficit. She exhibits normal muscle tone. Coordination normal.   Skin: Skin is warm and dry. Capillary refill takes less than 2 seconds. No rash noted. She is not diaphoretic.   Psychiatric: Judgment and thought content normal. Her mood appears anxious. Her speech is tangential. Her speech is not delayed and not slurred. She is hyperactive. She is not agitated, not aggressive, not slowed, not withdrawn, not actively hallucinating and not combative. Cognition and memory are normal. She is communicative.   Speech mildly rapid and tangential.  She is attentive.   Nursing note and vitals reviewed.        Assessment/Plan      Diagnosis Plan   1. Encounter to establish care     2. Raynaud's phenomenon without gangrene  Recommended rheumatology referral. She will let me know which provider she would like to see soon after she has time to find out who her sister recommends.      3. Swelling of both hands  Recommended rheumatology referral. She will let me know which provider she would like to see soon after she has time to find out who her sister recommends.      4. Polyarthralgia  Recommended rheumatology referral. She will let me know which provider she would like to see soon after she has time to find out who her sister recommends.      5. Bilateral leg edema  Comprehensive Metabolic Panel    Begin 1/2 tab daily: triamterene-hydrochlorothiazide (MAXZIDE-25) 37.5-25 MG per tablet     6. Weight gain  TSH    T4, Free     7. Need for hepatitis C screening test  Hepatitis C Antibody     8.  Preventative health care  Lipid panel  CMP     9. Multiple-type hyperlipidemia  Lipid Panel     10. Gastroesophageal reflux disease with esophagitis  Ambulatory Referral to Gastroenterology    Dose increase: omeprazole (priLOSEC) 40 MG capsule     11. Hiatal hernia  Ambulatory Referral to Gastroenterology     12. Thyromegaly  US Thyroid    TSH    T4, Free     13. Vitamin D deficiency  Vitamin D 25 Hydroxy     14. Cobalamin deficiency  Vitamin B12           Return in about 6 months (around 1/23/2021) for Annual physical.             SEBASTIAN Hobbs  07/23/2020  2:21 PM

## 2025-07-03 NOTE — PROGRESS NOTE ADULT - SUBJECTIVE AND OBJECTIVE BOX
Patient seen and examined at bedside on AM rounds. Rapid response last evening, tachycardic and febrile, c/f possible sepsis. Labs WNL. extravasation of contrast at IV site during CTAP, neurovascular check normal o/n. CT scan concerning for worsening intraabdominal abscess     O: Vital Signs  T(C): 38 (07-03 @ 09:50), Max: 38.5 (07-02 @ 16:20)  HR: 96 (07-03 @ 09:49) (82 - 181)  BP: 130/76 (07-03 @ 09:49) (121/80 - 145/89)  RR: 20 (07-03 @ 09:49) (20 - 24)  SpO2: 100% (07-03 @ 09:49) (92% - 100%)  07-02-25 @ 07:01  -  07-03-25 @ 07:00  --------------------------------------------------------  IN:  Total IN: 0 mL    OUT:    Voided (mL): 1300 mL  Total OUT: 1300 mL    Total NET: -1300 mL          Physical Exam:  General: NAD  Resp: respirations unlabored  CVS: regular rate and rhythm  Abdomen: soft, nontender, nondistended  Extremities: no edema  Skin: warm, dry, appropriate color                          10.5   8.72  )-----------( 172      ( 03 Jul 2025 05:45 )             32.5   07-03    137  |  97[L]  |  7   ----------------------------<  100[H]  3.0[L]   |  26  |  0.82    Ca    8.8      03 Jul 2025 05:45  Phos  3.0     07-03  Mg     1.90     07-03         Patient seen and examined at bedside on AM rounds. Rapid response last evening, tachycardic and febrile, c/f possible sepsis. Labs WNL. extravasation of contrast at IV site during CTAP, neurovascular check normal o/n. CT scan concerning for worsening intraabdominal abscess. antibiotic changed to sozyn     O: Vital Signs  T(C): 38 (07-03 @ 09:50), Max: 38.5 (07-02 @ 16:20)  HR: 96 (07-03 @ 09:49) (82 - 181)  BP: 130/76 (07-03 @ 09:49) (121/80 - 145/89)  RR: 20 (07-03 @ 09:49) (20 - 24)  SpO2: 100% (07-03 @ 09:49) (92% - 100%)  07-02-25 @ 07:01  -  07-03-25 @ 07:00  --------------------------------------------------------  IN:  Total IN: 0 mL    OUT:    Voided (mL): 1300 mL  Total OUT: 1300 mL    Total NET: -1300 mL          Physical Exam:  General: NAD  Resp: respirations unlabored  CVS: regular rate and rhythm  Abdomen: soft, nontender, nondistended  Extremities: no edema  Skin: warm, dry, appropriate color                          10.5   8.72  )-----------( 172      ( 03 Jul 2025 05:45 )             32.5   07-03    137  |  97[L]  |  7   ----------------------------<  100[H]  3.0[L]   |  26  |  0.82    Ca    8.8      03 Jul 2025 05:45  Phos  3.0     07-03  Mg     1.90     07-03

## 2025-07-03 NOTE — CHART NOTE - NSCHARTNOTEFT_GEN_A_CORE
General Surgery Post op Check    Pt seen and examined without complaints. Pain is controlled. Denies SOB/CP/N/V.     Vital Signs Last 24 Hrs  T(C): 37.4 (03 Jul 2025 20:50), Max: 38 (03 Jul 2025 09:50)  T(F): 99.3 (03 Jul 2025 20:50), Max: 100.4 (03 Jul 2025 09:50)  HR: 100 (03 Jul 2025 20:50) (89 - 106)  BP: 123/74 (03 Jul 2025 20:50) (110/73 - 147/83)  BP(mean): --  RR: 16 (03 Jul 2025 20:50) (16 - 24)  SpO2: 94% (03 Jul 2025 20:50) (94% - 100%)    Parameters below as of 03 Jul 2025 20:50  Patient On (Oxygen Delivery Method): room air        I&O's Summary    02 Jul 2025 07:01  -  03 Jul 2025 07:00  --------------------------------------------------------  IN: 1950 mL / OUT: 1300 mL / NET: 650 mL    03 Jul 2025 07:01  -  03 Jul 2025 22:41  --------------------------------------------------------  IN: 400 mL / OUT: 200 mL / NET: 200 mL        Physical Exam  Gen: NAD, A&Ox3  Pulm: Audible grunting through his mouth, pt satting well. Clear breath sounds.   CV: RRR, no JVD  Abd: Soft, appropriately tender, mildly distended, incisions c/d/i        A/P: 75y Male s/p image guided diverticular abscess drain placement. Patient endorses appropriate pain control and denies nausea, vomits, fever, chills, or SOB. Abdominal exam with appropriate incisional tenderness without significant distention, or bleeding/oozing from incison sites. Patient hemodynamically stable recovering well post-procedure.     -DVT prophylaxis w/ SCD.   -F/u AM labs   -Encouraged OOB  -Diet: NPO  -Strict I&O's  -Analgesia and antiemetics as needed

## 2025-07-03 NOTE — PROGRESS NOTE ADULT - SUBJECTIVE AND OBJECTIVE BOX
Follow Up:      Interval History/ROS:Patient is a 75y old  Male who presents with a chief complaint of diverticulitis (02 Jul 2025 16:59)    RRT called 7/2 for CP and tachycardia, pt found to have temp of 101.3. Repeat BCX ordered. ID reconsulted for further recommendation      Allergies  No Known Allergies        ANTIMICROBIALS:    piperacillin/tazobactam IVPB.. 3.375 every 8 hours      OTHER MEDS: MEDICATIONS  (STANDING):  enoxaparin Injectable 40 every 24 hours  finasteride 5 daily  melatonin 3 at bedtime  methadone   Solution 2 every 12 hours      Vital Signs Last 24 Hrs  T(F): 100.4 (07-03-25 @ 09:50), Max: 101.3 (07-02-25 @ 16:20)    Vital Signs Last 24 Hrs  HR: 96 (07-03-25 @ 09:49) (82 - 181)  BP: 130/76 (07-03-25 @ 09:49) (121/80 - 145/89)  RR: 20 (07-03-25 @ 09:49)  SpO2: 100% (07-03-25 @ 09:49) (92% - 100%)  Wt(kg): --    EXAM:  General: Patient in no acute distress   HEENT: NCAT, EOMI, PERRL, no oral lesions  CV: S1+S2, no m/r/g appreciated   Lungs: No respiratory distress, CTAB  Abd: Soft, nontender, no guarding, no rebound tenderness, + bowel sounds   Ext: No cyanosis, no edema  Neuro: Alert and oriented, no focal deficits, CN II-XII grossly intact   Skin: No rash   IV: No phlebitis      Labs:                        10.5   8.72  )-----------( 172      ( 03 Jul 2025 05:45 )             32.5     07-03    137  |  97[L]  |  7   ----------------------------<  100[H]  3.0[L]   |  26  |  0.82    Ca    8.8      03 Jul 2025 05:45  Phos  3.0     07-03  Mg     1.90     07-03        WBC Trend:  WBC Count: 8.72 (07-03-25 @ 05:45)  WBC Count: 8.82 (07-02-25 @ 16:30)  WBC Count: 7.11 (07-02-25 @ 05:30)  WBC Count: 5.17 (07-01-25 @ 05:30)      Creatine Trend:  Creatinine: 0.82 (07-03)  Creatinine: 0.78 (07-02)  Creatinine: 0.82 (07-02)  Creatinine: 0.80 (07-01)      Liver Biochemical Testing Trend:  Alanine Aminotransferase (ALT/SGPT): 14 (06-27)  Alanine Aminotransferase (ALT/SGPT): 20 (06-24)  Alanine Aminotransferase (ALT/SGPT): 21 (06-06)  Alanine Aminotransferase (ALT/SGPT): 22 (06-03)  Alanine Aminotransferase (ALT/SGPT): 28 (05-20)  Aspartate Aminotransferase (AST/SGOT): 17 (06-27-25 @ 23:20)  Aspartate Aminotransferase (AST/SGOT): 27 (06-24-25 @ 13:32)  Aspartate Aminotransferase (AST/SGOT): 14 (06-06-25 @ 19:30)  Aspartate Aminotransferase (AST/SGOT): 21 (06-03-25 @ 16:22)  Aspartate Aminotransferase (AST/SGOT): 25 (05-20-25 @ 15:13)  Bilirubin Total: 0.6 (06-27)  Bilirubin Total: 0.2 (06-24)  Bilirubin Total: <0.2 (06-06)  Bilirubin Total: <0.2 (06-03)  Bilirubin Total: <0.2 (05-20)      Trend LDH        MICROBIOLOGY:        Culture - Blood (collected 29 Jun 2025 02:00)  Source: Blood Blood  Preliminary Report:    No growth at 4 days    Culture - Blood (collected 29 Jun 2025 01:00)  Source: Blood Blood-Peripheral  Preliminary Report:    No growth at 4 days    Urinalysis with Rflx Culture (collected 28 Jun 2025 01:42)    Culture - Blood (collected 27 Jun 2025 23:30)  Source: Blood Blood-Peripheral  Final Report:    Growth in anaerobic bottle: Escherichia coli    Direct identification is available within approximately 3-5    hours either by Blood Panel Multiplexed PCR or Direct    MALDI-TOF. Details: [email or web address]  Organism: Blood Culture PCR  Escherichia coli  Organism: Escherichia coli    Sensitivities:      -  Levofloxacin: S <=0.5      -  Tobramycin: S <=2      -  Aztreonam: S <=4      -  Gentamicin: S <=2      -  Cefazolin: S <=2      -  Cefepime: S <=2      -  Piperacillin/Tazobactam: S <=8      -  Ciprofloxacin: S <=0.25      -  Imipenem: S <=1      -  Ceftriaxone: S <=1      -  Ampicillin: S <=8 These ampicillin results predict results for amoxicillin      Method Type: LILO      -  Meropenem: S <=1      -  Ampicillin/Sulbactam: S <=4/2      -  Cefoxitin: S <=8      -  Trimethoprim/Sulfamethoxazole: S <=0.5/9.5      -  Tigecycline: S <=2 Interpretations based on FDA breakpoints      -  Ertapenem: S <=0.5  Organism: Blood Culture PCR    Sensitivities:      -  Escherichia coli: Detec      Method Type: PCR    Culture - Blood (collected 27 Jun 2025 23:20)  Source: Blood Blood-Peripheral  Final Report:    No growth at 5 days                                          Troponin T, High Sensitivity Result: 15 (07-02)  Troponin T, High Sensitivity Result: 12 (06-28)    Blood Gas Arterial, Lactate: 0.9 (07-02 @ 16:30)      RADIOLOGY:  imaging below personally reviewed    < from: CT Abdomen and Pelvis w/ IV Cont (07.02.25 @ 17:37) >    IMPRESSION:  Severe colonic diverticulitis with suspected perforation as well as   abscess formation which appears slightly worse compared to prior CT.    < end of copied text >     Follow Up:      Interval History/ROS:Patient is a 75y old  Male who presents with a chief complaint of diverticulitis (02 Jul 2025 16:59)    RRT called 7/2 for CP and tachycardia, pt found to have temp of 101.3. Repeat BCX ordered. ID reconsulted for further recommendation      Allergies  No Known Allergies        ANTIMICROBIALS:    piperacillin/tazobactam IVPB.. 3.375 every 8 hours      OTHER MEDS: MEDICATIONS  (STANDING):  enoxaparin Injectable 40 every 24 hours  finasteride 5 daily  melatonin 3 at bedtime  methadone   Solution 2 every 12 hours      Vital Signs Last 24 Hrs  T(F): 100.4 (07-03-25 @ 09:50), Max: 101.3 (07-02-25 @ 16:20)    Vital Signs Last 24 Hrs  HR: 96 (07-03-25 @ 09:49) (82 - 181)  BP: 130/76 (07-03-25 @ 09:49) (121/80 - 145/89)  RR: 20 (07-03-25 @ 09:49)  SpO2: 100% (07-03-25 @ 09:49) (92% - 100%)  Wt(kg): --    EXAM:  General: Patient in no acute distress   HEENT: NCAT, EOMI, PERRL, no oral lesions  CV: S1+S2, no m/r/g appreciated   Lungs: No respiratory distress, CTAB  Abd: Soft, nontender, PEG in place   Ext: No cyanosis, no edema  Neuro: Alert and oriented  Skin: No rash   IV: No phlebitis      Labs:                        10.5   8.72  )-----------( 172      ( 03 Jul 2025 05:45 )             32.5     07-03    137  |  97[L]  |  7   ----------------------------<  100[H]  3.0[L]   |  26  |  0.82    Ca    8.8      03 Jul 2025 05:45  Phos  3.0     07-03  Mg     1.90     07-03        WBC Trend:  WBC Count: 8.72 (07-03-25 @ 05:45)  WBC Count: 8.82 (07-02-25 @ 16:30)  WBC Count: 7.11 (07-02-25 @ 05:30)  WBC Count: 5.17 (07-01-25 @ 05:30)      Creatine Trend:  Creatinine: 0.82 (07-03)  Creatinine: 0.78 (07-02)  Creatinine: 0.82 (07-02)  Creatinine: 0.80 (07-01)      Liver Biochemical Testing Trend:  Alanine Aminotransferase (ALT/SGPT): 14 (06-27)  Alanine Aminotransferase (ALT/SGPT): 20 (06-24)  Alanine Aminotransferase (ALT/SGPT): 21 (06-06)  Alanine Aminotransferase (ALT/SGPT): 22 (06-03)  Alanine Aminotransferase (ALT/SGPT): 28 (05-20)  Aspartate Aminotransferase (AST/SGOT): 17 (06-27-25 @ 23:20)  Aspartate Aminotransferase (AST/SGOT): 27 (06-24-25 @ 13:32)  Aspartate Aminotransferase (AST/SGOT): 14 (06-06-25 @ 19:30)  Aspartate Aminotransferase (AST/SGOT): 21 (06-03-25 @ 16:22)  Aspartate Aminotransferase (AST/SGOT): 25 (05-20-25 @ 15:13)  Bilirubin Total: 0.6 (06-27)  Bilirubin Total: 0.2 (06-24)  Bilirubin Total: <0.2 (06-06)  Bilirubin Total: <0.2 (06-03)  Bilirubin Total: <0.2 (05-20)      Trend LDH        MICROBIOLOGY:        Culture - Blood (collected 29 Jun 2025 02:00)  Source: Blood Blood  Preliminary Report:    No growth at 4 days    Culture - Blood (collected 29 Jun 2025 01:00)  Source: Blood Blood-Peripheral  Preliminary Report:    No growth at 4 days    Urinalysis with Rflx Culture (collected 28 Jun 2025 01:42)    Culture - Blood (collected 27 Jun 2025 23:30)  Source: Blood Blood-Peripheral  Final Report:    Growth in anaerobic bottle: Escherichia coli    Direct identification is available within approximately 3-5    hours either by Blood Panel Multiplexed PCR or Direct    MALDI-TOF. Details: [email or web address]  Organism: Blood Culture PCR  Escherichia coli  Organism: Escherichia coli    Sensitivities:      -  Levofloxacin: S <=0.5      -  Tobramycin: S <=2      -  Aztreonam: S <=4      -  Gentamicin: S <=2      -  Cefazolin: S <=2      -  Cefepime: S <=2      -  Piperacillin/Tazobactam: S <=8      -  Ciprofloxacin: S <=0.25      -  Imipenem: S <=1      -  Ceftriaxone: S <=1      -  Ampicillin: S <=8 These ampicillin results predict results for amoxicillin      Method Type: LILO      -  Meropenem: S <=1      -  Ampicillin/Sulbactam: S <=4/2      -  Cefoxitin: S <=8      -  Trimethoprim/Sulfamethoxazole: S <=0.5/9.5      -  Tigecycline: S <=2 Interpretations based on FDA breakpoints      -  Ertapenem: S <=0.5  Organism: Blood Culture PCR    Sensitivities:      -  Escherichia coli: Detec      Method Type: PCR    Culture - Blood (collected 27 Jun 2025 23:20)  Source: Blood Blood-Peripheral  Final Report:    No growth at 5 days                                          Troponin T, High Sensitivity Result: 15 (07-02)  Troponin T, High Sensitivity Result: 12 (06-28)    Blood Gas Arterial, Lactate: 0.9 (07-02 @ 16:30)      RADIOLOGY:  imaging below personally reviewed    < from: CT Abdomen and Pelvis w/ IV Cont (07.02.25 @ 17:37) >    IMPRESSION:  Severe colonic diverticulitis with suspected perforation as well as   abscess formation which appears slightly worse compared to prior CT.    < end of copied text >     Follow Up:      Interval History/ROS:Patient is a 75y old  Male who presents with a chief complaint of diverticulitis (02 Jul 2025 16:59)    RRT called 7/2 for CP and tachycardia, pt found to have temp of 101.3. Repeat BCX ordered. ID reconsulted for further recommendation      Allergies  No Known Allergies        ANTIMICROBIALS:    piperacillin/tazobactam IVPB.. 3.375 every 8 hours      OTHER MEDS: MEDICATIONS  (STANDING):  enoxaparin Injectable 40 every 24 hours  finasteride 5 daily  melatonin 3 at bedtime  methadone   Solution 2 every 12 hours      Vital Signs Last 24 Hrs  T(F): 100.4 (07-03-25 @ 09:50), Max: 101.3 (07-02-25 @ 16:20)    Vital Signs Last 24 Hrs  HR: 96 (07-03-25 @ 09:49) (82 - 181)  BP: 130/76 (07-03-25 @ 09:49) (121/80 - 145/89)  RR: 20 (07-03-25 @ 09:49)  SpO2: 100% (07-03-25 @ 09:49) (92% - 100%)  Wt(kg): --    EXAM:  General: Patient in no acute distress   Lungs: No respiratory distress, CTAB  Abd: Soft, nontender, PEG in place   Ext: No cyanosis, no edema  Neuro: Alert and oriented  Skin: No rash   IV: No phlebitis      Labs:                        10.5   8.72  )-----------( 172      ( 03 Jul 2025 05:45 )             32.5     07-03    137  |  97[L]  |  7   ----------------------------<  100[H]  3.0[L]   |  26  |  0.82    Ca    8.8      03 Jul 2025 05:45  Phos  3.0     07-03  Mg     1.90     07-03        WBC Trend:  WBC Count: 8.72 (07-03-25 @ 05:45)  WBC Count: 8.82 (07-02-25 @ 16:30)  WBC Count: 7.11 (07-02-25 @ 05:30)  WBC Count: 5.17 (07-01-25 @ 05:30)      Creatine Trend:  Creatinine: 0.82 (07-03)  Creatinine: 0.78 (07-02)  Creatinine: 0.82 (07-02)  Creatinine: 0.80 (07-01)      Liver Biochemical Testing Trend:  Alanine Aminotransferase (ALT/SGPT): 14 (06-27)  Alanine Aminotransferase (ALT/SGPT): 20 (06-24)  Alanine Aminotransferase (ALT/SGPT): 21 (06-06)  Alanine Aminotransferase (ALT/SGPT): 22 (06-03)  Alanine Aminotransferase (ALT/SGPT): 28 (05-20)  Aspartate Aminotransferase (AST/SGOT): 17 (06-27-25 @ 23:20)  Aspartate Aminotransferase (AST/SGOT): 27 (06-24-25 @ 13:32)  Aspartate Aminotransferase (AST/SGOT): 14 (06-06-25 @ 19:30)  Aspartate Aminotransferase (AST/SGOT): 21 (06-03-25 @ 16:22)  Aspartate Aminotransferase (AST/SGOT): 25 (05-20-25 @ 15:13)  Bilirubin Total: 0.6 (06-27)  Bilirubin Total: 0.2 (06-24)  Bilirubin Total: <0.2 (06-06)  Bilirubin Total: <0.2 (06-03)  Bilirubin Total: <0.2 (05-20)      Trend LDH        MICROBIOLOGY:        Culture - Blood (collected 29 Jun 2025 02:00)  Source: Blood Blood  Preliminary Report:    No growth at 4 days    Culture - Blood (collected 29 Jun 2025 01:00)  Source: Blood Blood-Peripheral  Preliminary Report:    No growth at 4 days    Urinalysis with Rflx Culture (collected 28 Jun 2025 01:42)    Culture - Blood (collected 27 Jun 2025 23:30)  Source: Blood Blood-Peripheral  Final Report:    Growth in anaerobic bottle: Escherichia coli    Direct identification is available within approximately 3-5    hours either by Blood Panel Multiplexed PCR or Direct    MALDI-TOF. Details: [email or web address]  Organism: Blood Culture PCR  Escherichia coli  Organism: Escherichia coli    Sensitivities:      -  Levofloxacin: S <=0.5      -  Tobramycin: S <=2      -  Aztreonam: S <=4      -  Gentamicin: S <=2      -  Cefazolin: S <=2      -  Cefepime: S <=2      -  Piperacillin/Tazobactam: S <=8      -  Ciprofloxacin: S <=0.25      -  Imipenem: S <=1      -  Ceftriaxone: S <=1      -  Ampicillin: S <=8 These ampicillin results predict results for amoxicillin      Method Type: LILO      -  Meropenem: S <=1      -  Ampicillin/Sulbactam: S <=4/2      -  Cefoxitin: S <=8      -  Trimethoprim/Sulfamethoxazole: S <=0.5/9.5      -  Tigecycline: S <=2 Interpretations based on FDA breakpoints      -  Ertapenem: S <=0.5  Organism: Blood Culture PCR    Sensitivities:      -  Escherichia coli: Detec      Method Type: PCR    Culture - Blood (collected 27 Jun 2025 23:20)  Source: Blood Blood-Peripheral  Final Report:    No growth at 5 days                                          Troponin T, High Sensitivity Result: 15 (07-02)  Troponin T, High Sensitivity Result: 12 (06-28)    Blood Gas Arterial, Lactate: 0.9 (07-02 @ 16:30)      RADIOLOGY:  imaging below personally reviewed    < from: CT Abdomen and Pelvis w/ IV Cont (07.02.25 @ 17:37) >    IMPRESSION:  Severe colonic diverticulitis with suspected perforation as well as   abscess formation which appears slightly worse compared to prior CT.    < end of copied text >

## 2025-07-03 NOTE — PROGRESS NOTE ADULT - SUBJECTIVE AND OBJECTIVE BOX
Patient is a 75y old  Male who presents with a chief complaint of diverticulitis (03 Jul 2025 11:49)      DATE OF SERVICE: 07-03-25 @ 12:39    SUBJECTIVE / OVERNIGHT EVENTS: overnight events noted    ROS:  not available           MEDICATIONS  (STANDING):  enoxaparin Injectable 40 milliGRAM(s) SubCutaneous every 24 hours  finasteride 5 milliGRAM(s) Oral daily  melatonin 3 milliGRAM(s) Oral at bedtime  methadone   Solution 2 milliGRAM(s) Enteral Tube every 12 hours  piperacillin/tazobactam IVPB.. 3.375 Gram(s) IV Intermittent every 8 hours  potassium chloride  10 mEq/100 mL IVPB 10 milliEquivalent(s) IV Intermittent every 1 hour  silver sulfADIAZINE 1% Cream 1 Application(s) Topical daily  sodium chloride 0.9%. 1000 milliLiter(s) (100 mL/Hr) IV Continuous <Continuous>    MEDICATIONS  (PRN):  sodium chloride 0.65% Nasal 1 Spray(s) Both Nostrils four times a day PRN Nasal Congestion        CAPILLARY BLOOD GLUCOSE      POCT Blood Glucose.: 124 mg/dL (02 Jul 2025 16:16)    I&O's Summary    02 Jul 2025 07:01  -  03 Jul 2025 07:00  --------------------------------------------------------  IN: 1950 mL / OUT: 1300 mL / NET: 650 mL        Vital Signs Last 24 Hrs  T(C): 38 (03 Jul 2025 09:50), Max: 38.5 (02 Jul 2025 16:20)  T(F): 100.4 (03 Jul 2025 09:50), Max: 101.3 (02 Jul 2025 16:20)  HR: 96 (03 Jul 2025 09:49) (82 - 181)  BP: 130/76 (03 Jul 2025 09:49) (121/80 - 145/89)  BP(mean): --  RR: 20 (03 Jul 2025 09:49) (20 - 24)  SpO2: 100% (03 Jul 2025 09:49) (92% - 100%)    PHYSICAL EXAM:  NECK: Supple, No JVD  CHEST/LUNG: clear   HEART: S1 S2; no murmurs   ABDOMEN: Soft, mild tenderness   EXTREMITIES: no edema  NEUROLOGY: Alert non-focal  SKIN: No rashes or lesions    LABS:                        10.5   8.72  )-----------( 172      ( 03 Jul 2025 05:45 )             32.5     07-03    137  |  97[L]  |  7   ----------------------------<  100[H]  3.0[L]   |  26  |  0.82    Ca    8.8      03 Jul 2025 05:45  Phos  3.0     07-03  Mg     1.90     07-03      PT/INR - ( 02 Jul 2025 16:30 )   PT: 13.0 sec;   INR: 1.12 ratio         PTT - ( 02 Jul 2025 16:30 )  PTT:28.6 sec  CARDIAC MARKERS ( 02 Jul 2025 16:30 )  x     / x     / x     / x     / 4.4 ng/mL      Urinalysis Basic - ( 03 Jul 2025 05:45 )    Color: x / Appearance: x / SG: x / pH: x  Gluc: 100 mg/dL / Ketone: x  / Bili: x / Urobili: x   Blood: x / Protein: x / Nitrite: x   Leuk Esterase: x / RBC: x / WBC x   Sq Epi: x / Non Sq Epi: x / Bacteria: x          All consultant(s) notes reviewed and care discussed with other providers        Contact Number, Dr Mcnulty [phone]

## 2025-07-03 NOTE — PROGRESS NOTE ADULT - ASSESSMENT
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 Had SVT  managed with vagal maneuvres. GNR in blood 6/27. 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 Had SVT  managed with vagal maneuvres. Now on the floor for diverticulitis management    Plan:  - hold tube feeds for now for possible procedure  - IR consult for intraabdominal abscess  - ID consult for antibiotic revision  - continue IV fluids  - monitor temp

## 2025-07-04 NOTE — PROGRESS NOTE ADULT - SUBJECTIVE AND OBJECTIVE BOX
SUBJECTIVE:     Vital Signs Last 24 Hrs  T(C): 36.6 (04 Jul 2025 09:00), Max: 37.4 (03 Jul 2025 20:50)  T(F): 97.9 (04 Jul 2025 09:00), Max: 99.3 (03 Jul 2025 20:50)  HR: 94 (04 Jul 2025 09:00) (89 - 100)  BP: 119/82 (04 Jul 2025 09:00) (110/73 - 149/62)  BP(mean): --  RR: 18 (04 Jul 2025 09:00) (16 - 24)  SpO2: 96% (04 Jul 2025 09:00) (88% - 100%)    Parameters below as of 04 Jul 2025 09:00  Patient On (Oxygen Delivery Method): nasal cannula  O2 Flow (L/min): 2      I&O's Detail    03 Jul 2025 07:01  -  04 Jul 2025 07:00  --------------------------------------------------------  IN:    sodium chloride 0.9%: 1500 mL  Total IN: 1500 mL    OUT:    Bulb (mL): 5 mL    Oral Fluid: 0 mL    Voided (mL): 500 mL  Total OUT: 505 mL    Total NET: 995 mL          Physical Exam:  General Appearance: Appears well, NAD  Respiratory: No acute resp distress, no accesory muscle use  Abdomen: Soft, nontender, appropriate incisional tenderness, dressings clean and dry and intact  Extremities: Grossly symmetric, SCD's in place     LABS:                        9.6    7.75  )-----------( 165      ( 04 Jul 2025 04:49 )             30.0     07-04    136  |  97[L]  |  10  ----------------------------<  102[H]  3.0[L]   |  26  |  0.79    Ca    8.1[L]      04 Jul 2025 04:49  Phos  3.1     07-04  Mg     1.80     07-04      PT/INR - ( 02 Jul 2025 16:30 )   PT: 13.0 sec;   INR: 1.12 ratio         PTT - ( 02 Jul 2025 16:30 )  PTT:28.6 sec  Urinalysis Basic - ( 04 Jul 2025 04:49 )    Color: x / Appearance: x / SG: x / pH: x  Gluc: 102 mg/dL / Ketone: x  / Bili: x / Urobili: x   Blood: x / Protein: x / Nitrite: x   Leuk Esterase: x / RBC: x / WBC x   Sq Epi: x / Non Sq Epi: x / Bacteria: x        RADIOLOGY & ADDITIONAL STUDIES:   SUBJECTIVE: patient seen in AM rounds. s/p IR drainage, pt resting comfortably    Vital Signs Last 24 Hrs  T(C): 36.6 (04 Jul 2025 09:00), Max: 37.4 (03 Jul 2025 20:50)  T(F): 97.9 (04 Jul 2025 09:00), Max: 99.3 (03 Jul 2025 20:50)  HR: 94 (04 Jul 2025 09:00) (89 - 100)  BP: 119/82 (04 Jul 2025 09:00) (110/73 - 149/62)  BP(mean): --  RR: 18 (04 Jul 2025 09:00) (16 - 24)  SpO2: 96% (04 Jul 2025 09:00) (88% - 100%)    Parameters below as of 04 Jul 2025 09:00  Patient On (Oxygen Delivery Method): nasal cannula  O2 Flow (L/min): 2      I&O's Detail    03 Jul 2025 07:01  -  04 Jul 2025 07:00  --------------------------------------------------------  IN:    sodium chloride 0.9%: 1500 mL  Total IN: 1500 mL    OUT:    Bulb (mL): 5 mL    Oral Fluid: 0 mL    Voided (mL): 500 mL  Total OUT: 505 mL    Total NET: 995 mL          Physical Exam:  General Appearance: Appears well, NAD  Respiratory: No acute resp distress, no accesory muscle use  Abdomen: Soft, nontender, non distended  Extremities: Grossly symmetric, SCD's in place     LABS:                        9.6    7.75  )-----------( 165      ( 04 Jul 2025 04:49 )             30.0     07-04    136  |  97[L]  |  10  ----------------------------<  102[H]  3.0[L]   |  26  |  0.79    Ca    8.1[L]      04 Jul 2025 04:49  Phos  3.1     07-04  Mg     1.80     07-04      PT/INR - ( 02 Jul 2025 16:30 )   PT: 13.0 sec;   INR: 1.12 ratio         PTT - ( 02 Jul 2025 16:30 )  PTT:28.6 sec  Urinalysis Basic - ( 04 Jul 2025 04:49 )    Color: x / Appearance: x / SG: x / pH: x  Gluc: 102 mg/dL / Ketone: x  / Bili: x / Urobili: x   Blood: x / Protein: x / Nitrite: x   Leuk Esterase: x / RBC: x / WBC x   Sq Epi: x / Non Sq Epi: x / Bacteria: x        RADIOLOGY & ADDITIONAL STUDIES:

## 2025-07-04 NOTE — CHART NOTE - NSCHARTNOTEFT_GEN_A_CORE
Received a call from RN around 23:15 stating pt was desaturating to around 88%. On examination the patient was sitting comfortably in bed, in NAD. His HR was slightly elevated around 100 BPM. He did not complain of pain, abdominal exam showed no rebound, guarding or tenderness. Pt was placed on 2L NC and his saturation improved to upper 90s. Pt was re-examined around 1 AM (7/4/2025) and he was sleeping comfortably in bed, saturating upper 90s on 2LNC.

## 2025-07-04 NOTE — PROGRESS NOTE ADULT - SUBJECTIVE AND OBJECTIVE BOX
Patient is a 75y old  Male who presents with a chief complaint of diverticulitis (03 Jul 2025 12:39)      DATE OF SERVICE: 07-04-25 @ 10:47    SUBJECTIVE / OVERNIGHT EVENTS: overnight events noted    ROS:  Resp: No cough no sputum production  CVS: No chest pain no palpitations no orthopnea  GI: no N/V/D  family at bedside       MEDICATIONS  (STANDING):  enoxaparin Injectable 40 milliGRAM(s) SubCutaneous every 24 hours  magnesium sulfate  IVPB 1 Gram(s) IV Intermittent once  methadone   Solution 1 milliGRAM(s) Enteral Tube every 12 hours  piperacillin/tazobactam IVPB.. 3.375 Gram(s) IV Intermittent every 8 hours  potassium chloride   Powder 20 milliEquivalent(s) Enteral Tube once  silver sulfADIAZINE 1% Cream 1 Application(s) Topical daily  sodium chloride 0.9%. 1000 milliLiter(s) (100 mL/Hr) IV Continuous <Continuous>    MEDICATIONS  (PRN):  sodium chloride 0.65% Nasal 1 Spray(s) Both Nostrils four times a day PRN Nasal Congestion        CAPILLARY BLOOD GLUCOSE        I&O's Summary    03 Jul 2025 07:01  -  04 Jul 2025 07:00  --------------------------------------------------------  IN: 1500 mL / OUT: 505 mL / NET: 995 mL        Vital Signs Last 24 Hrs  T(C): 36.6 (04 Jul 2025 09:00), Max: 37.4 (03 Jul 2025 20:50)  T(F): 97.9 (04 Jul 2025 09:00), Max: 99.3 (03 Jul 2025 20:50)  HR: 94 (04 Jul 2025 09:00) (89 - 100)  BP: 119/82 (04 Jul 2025 09:00) (110/73 - 149/62)  BP(mean): --  RR: 18 (04 Jul 2025 09:00) (16 - 24)  SpO2: 96% (04 Jul 2025 09:00) (88% - 100%)    PHYSICAL EXAM:  right neck stable chronic induration erythema  CHEST/LUNG: clear   HEART: S1 S2; no murmurs   ABDOMEN: Soft, mild tenderness   EXTREMITIES: no edema  NEUROLOGY: Alert non-focal  SKIN: No rashes or lesions    LABS:                        9.6    7.75  )-----------( 165      ( 04 Jul 2025 04:49 )             30.0     07-04    136  |  97[L]  |  10  ----------------------------<  102[H]  3.0[L]   |  26  |  0.79    Ca    8.1[L]      04 Jul 2025 04:49  Phos  3.1     07-04  Mg     1.80     07-04      PT/INR - ( 02 Jul 2025 16:30 )   PT: 13.0 sec;   INR: 1.12 ratio         PTT - ( 02 Jul 2025 16:30 )  PTT:28.6 sec  CARDIAC MARKERS ( 02 Jul 2025 16:30 )  x     / x     / x     / x     / 4.4 ng/mL      Urinalysis Basic - ( 04 Jul 2025 04:49 )    Color: x / Appearance: x / SG: x / pH: x  Gluc: 102 mg/dL / Ketone: x  / Bili: x / Urobili: x   Blood: x / Protein: x / Nitrite: x   Leuk Esterase: x / RBC: x / WBC x   Sq Epi: x / Non Sq Epi: x / Bacteria: x          All consultant(s) notes reviewed and care discussed with other providers        Contact Number, Dr Mcnulty [phone]

## 2025-07-04 NOTE — PROGRESS NOTE ADULT - ASSESSMENT
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 Had SVT  managed with vagal maneuvres. Now on the floor for diverticulitis management    Plan:  - resume bolus tube feeds  - discontinue IV fluids  - replete lytes  - monitor temp

## 2025-07-05 NOTE — CHART NOTE - NSCHARTNOTEFT_GEN_A_CORE
RN paged around 2:30 AM stating that the patient was OOB and tachycardic to 180s. During this time, the pt was OOB having a BM. On examination, the pt was AOx3, normotensive around 107/80, and afebrile. A 12-lead EKG was ordered and performed, which showed afib with rvr. We ordered a 5 mg push of lopressor, a full set of labs, and cadiac enzymes. At this point the pts HR was regular in the 70s. On exam he was still AOx3 and normotensive. Of note, the pt has no known hx of afib, but has had episodes of AVT during this admission that have resolved with vagal maneuvers.     Pt seen and examined and plan discussed with senior. RN paged around 2:30 AM stating that the patient was OOB and tachycardic to 180s. During this time, the pt was OOB having a BM. On examination, the pt was AOx3, normotensive around 107/80, and afebrile. A 12-lead EKG was ordered and performed, which showed afib with rvr. We ordered a 5 mg push of lopressor, a full set of labs, and cadiac enzymes. At this point the pts HR was regular in the 70s. On exam he was still AOx3 and normotensive. Of note, the pt has no known hx of afib, but has had episodes of SVT during this admission that have resolved with vagal maneuvers.     Pt seen and examined and plan discussed with senior.

## 2025-07-05 NOTE — PROGRESS NOTE ADULT - ASSESSMENT
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 Had SVT  managed with vagal maneuvres. Now on the floor for diverticulitis management, slightly worsening repeat CT scan of diverticulitis with small abscess, now s/p IR drainage of pericolonic abscess 07/03.    Plan:  - Fu with medicine regarding Afib and SVT    - discuss cardiology consult and recs   - Fu with IR re dressing changes and drain management   - Bolus tube feeds  - Discontinue IV fluids  - Replete lytes   - replete lytes  - monitor temps    A team: [id number]

## 2025-07-05 NOTE — PROVIDER CONTACT NOTE (OTHER) - BACKGROUND
Addended by: MONIQUE LISA on: 3/11/2024 11:36 AM     Modules accepted: Orders     diverticulitis without perforation or abscess or bleeding, Hx of cancer of sinus, anxiety

## 2025-07-05 NOTE — PROGRESS NOTE ADULT - SUBJECTIVE AND OBJECTIVE BOX
Patient is a 75y old  Male who presents with a chief complaint of diverticulitis     DATE OF SERVICE: 07-05-25     SUBJECTIVE / OVERNIGHT EVENTS: overnight events noted    ROS:  Resp: No cough no sputum production  CVS: No chest pain no palpitations no orthopnea  GI: no N/V/D        MEDICATIONS  (STANDING):  enoxaparin Injectable 40 milliGRAM(s) SubCutaneous every 24 hours  methadone   Solution 1 milliGRAM(s) Enteral Tube every 12 hours  metoprolol tartrate 25 milliGRAM(s) Enteral Tube two times a day  piperacillin/tazobactam IVPB.. 3.375 Gram(s) IV Intermittent every 8 hours  potassium chloride  10 mEq/100 mL IVPB 10 milliEquivalent(s) IV Intermittent every 1 hour  potassium phosphate IVPB 15 milliMole(s) IV Intermittent once  silver sulfADIAZINE 1% Cream 1 Application(s) Topical daily    MEDICATIONS  (PRN):  acetaminophen   IVPB .. 1000 milliGRAM(s) IV Intermittent every 6 hours PRN Mild Pain (1 - 3)  sodium chloride 0.65% Nasal 1 Spray(s) Both Nostrils four times a day PRN Nasal Congestion      VITAL SIGNS:  T(C): 37.1 (07-05-25 @ 05:19), Max: 37.2 (07-05-25 @ 01:01)  HR: 75 (07-05-25 @ 05:19) (75 - 130)  BP: 122/89 (07-05-25 @ 05:19) (106/59 - 132/69)  RR: 18 (07-05-25 @ 05:19) (17 - 18)  SpO2: 99% (07-05-25 @ 05:19) (98% - 99%)    PHYSICAL EXAM:  right neck stable chronic induration  CHEST/LUNG: clear   HEART: S1 S2; no murmurs   ABDOMEN: Soft, mild tenderness   EXTREMITIES: no edema  NEUROLOGY: Alert non-focal  SKIN: No rashes or lesions    LABS:                                     9.4    5.56  )-----------( 174      ( 05 Jul 2025 03:38 )             29.3   07-05    137  |  99  |  16  ----------------------------<  125[H]  3.1[L]   |  26  |  0.80    Ca    8.1[L]      05 Jul 2025   03:38  Phos  1.6     07-05  Mg     2.00     07-05    TPro  5.5[L]  /  Alb  2.9[L]  /  TBili  0.2  /  DBili  x   /  AST  24  /  ALT  18  /  AlkPhos  62  07-05    CK: 93; CKMB : 3.6    --------------------------------------------------------------------------------------    INS AND OUTS:    07-04-25 @ 07:01  -  07-05-25 @ 07:00  --------------------------------------------------------  IN: 2070 mL / OUT: 1208.5 mL / NET: 861.5 mL    UMESH drain: 8.5 mL           All consultant(s) notes reviewed and care discussed with other providers        Contact Number, Dr Mcnulty [phone]

## 2025-07-05 NOTE — PROGRESS NOTE ADULT - SUBJECTIVE AND OBJECTIVE BOX
TEAM [ A ] Surgery Daily Progress Note  =====================================================    SUBJECTIVE: Patient seen and examined at bedside on AM rounds. Patient reports feeling well, without any new issues. NPO with bolus tube feeds, tolerating, denies nausea and vomiting.     OVN: 2 am episode of tachycardia to 180 with getting up to the bathroom. Given 5 of lopressor, EKG with Afib with RVR, labs and cardiac enzymes wnl, tachycardia resolved, HR in 70s by 4 am.     ALLERGIES:  No Known Allergies      --------------------------------------------------------------------------------------    MEDICATIONS:    Neurologic Medications  acetaminophen   IVPB .. 1000 milliGRAM(s) IV Intermittent every 6 hours  methadone   Solution 1 milliGRAM(s) Enteral Tube every 12 hours    Respiratory Medications    Cardiovascular Medications    Gastrointestinal Medications  sodium chloride 0.9%. 1000 milliLiter(s) IV Continuous <Continuous>    Genitourinary Medications    Hematologic/Oncologic Medications  enoxaparin Injectable 40 milliGRAM(s) SubCutaneous every 24 hours    Antimicrobial/Immunologic Medications  piperacillin/tazobactam IVPB.. 3.375 Gram(s) IV Intermittent every 8 hours    Endocrine/Metabolic Medications    Topical/Other Medications  silver sulfADIAZINE 1% Cream 1 Application(s) Topical daily  sodium chloride 0.65% Nasal 1 Spray(s) Both Nostrils four times a day PRN Nasal Congestion    --------------------------------------------------------------------------------------    VITAL SIGNS:  T(C): 37.1 (07-05-25 @ 05:19), Max: 37.2 (07-05-25 @ 01:01)  HR: 75 (07-05-25 @ 05:19) (75 - 130)  BP: 122/89 (07-05-25 @ 05:19) (106/59 - 132/69)  RR: 18 (07-05-25 @ 05:19) (17 - 18)  SpO2: 99% (07-05-25 @ 05:19) (98% - 99%)  --------------------------------------------------------------------------------------    EXAM    General: NAD, resting in bed comfortably.  Cardiac: regular rate, warm and well perfused  Respiratory: Nonlabored respirations, normal cw expansion.  Abdomen: soft, mildly tender in LLQ, nondistended, UMESH drain in LLQ with sanguinous fluid and dressing mildly saturated with blood   Extremities: No deformities    --------------------------------------------------------------------------------------    LABS                          9.4    5.56  )-----------( 174 ( 05 Jul 2025 03:38 )             29.3   07-05    137  |  99  |  16  ----------------------------<  125[H]  3.1[L]   |  26  |  0.80    Ca    8.1[L]      05 Jul 2025   03:38  Phos  1.6     07-05  Mg     2.00     07-05    TPro  5.5[L]  /  Alb  2.9[L]  /  TBili  0.2  /  DBili  x   /  AST  24  /  ALT  18  /  AlkPhos  62  07-05    CK: 93; CKMB : 3.6    --------------------------------------------------------------------------------------    INS AND OUTS:    07-04-25 @ 07:01  -  07-05-25 @ 07:00  --------------------------------------------------------  IN: 2070 mL / OUT: 1208.5 mL / NET: 861.5 mL    UMESH drain: 8.5 mL       --------------------------------------------------------------------------------------

## 2025-07-05 NOTE — CONSULT NOTE ADULT - SUBJECTIVE AND OBJECTIVE BOX
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 He reports the symptoms began on Tuesday while at chemotherapy with crampy abdominal pain associated with nausea. He has been otherwise tolerating PEG tube feeds. Denies fevers at home. No diarrhea. Never had similar episodes. Had a colonoscopy 5 years ago which showed benign polyps. In the ED, patient was noted to be febrile to 100.7 and in SVT with HR in 170s. Given beta blockers, antibiotics and IVF resuscitation with improvement. CT scan of diverticulitis with small abscess, now s/p IR drainage of pericolonic abscess 07/03.      PAST MEDICAL & SURGICAL HISTORY:  BPH with elevated PSA  Osteoarthritis  Hyperlipidemia  Anxiety  Cancer of sinus  Malignant neoplasm of nasopharynx  History of chemotherapy  S/P radiation therapy  History of knee replacement procedure of right knee  S/P total knee replacement, left  S/P cataract surgery  H/O colonoscopy  H/O cervical biopsy  History of incisional hernia repair      MEDICATIONS:  MEDICATIONS  (STANDING):  enoxaparin Injectable 40 milliGRAM(s) SubCutaneous every 24 hours  methadone   Solution 1 milliGRAM(s) Enteral Tube every 12 hours  piperacillin/tazobactam IVPB.. 3.375 Gram(s) IV Intermittent every 8 hours  potassium chloride   Powder 20 milliEquivalent(s) Oral once  silver sulfADIAZINE 1% Cream 1 Application(s) Topical daily      Allergies: No Known Allergies    FAMILY HISTORY:  Family hx of colon cancer (Aunt)      SOCIAL HISTORY:    [ X] Non-smoker  [ ] Smoker  [ ] Alcohol    FLU VACCINE THIS YEAR STARTS IN AUGUST:  [ ] Yes    [ ] No    IF OVER 65 HAVE YOU EVER HAD A PNA VACCINE:  [ ] Yes    [ ] No       [ ] N/A      REVIEW OF SYSTEMS:  [ ]chest pain  [  ]shortness of breath  [  ]palpitations  [  ]syncope  [ ]near syncope [ ]upper extremity weakness   [ ] lower extremity weakness  [  ]diplopia  [  ]altered mental status   [  ]fevers  [ ]chills [ ]nausea  [ ]vomiting  [  ]dysphagia    [ ]abdominal pain  [ ]melena  [ ]BRBPR    [  ]epistaxis  [  ]rash    [ ]lower extremity edema        [X] All others negative	  [ ] Unable to obtain      LABS:	 	    CARDIAC MARKERS:  CARDIAC MARKERS ( 05 Jul 2025 03:38 )  x     / x     / x     / x     / 3.6 ng/mL  CARDIAC MARKERS ( 02 Jul 2025 16:30 )  x     / x     / x     / x     / 4.4 ng/mL                              9.4    5.56  )-----------( 174      ( 05 Jul 2025 03:38 )             29.3     Hb Trend: 9.4<--    07-05    137  |  99  |  16  ----------------------------<  125[H]  3.1[L]   |  26  |  0.80    Ca    8.1[L]      05 Jul 2025 03:38  Phos  1.6     07-05  Mg     2.00     07-05    TPro  5.5[L]  /  Alb  2.9[L]  /  TBili  0.2  /  DBili  x   /  AST  24  /  ALT  18  /  AlkPhos  62  07-05    Creatinine Trend: 0.80<--, 0.79<--, 0.82<--, 0.78<--, 0.82<--, 0.80<--    Coags:      proBNP:   Lipid Profile:   HgA1c:   TSH:         PHYSICAL EXAM:  T(C): 36.8 (07-05-25 @ 09:11), Max: 37.2 (07-05-25 @ 01:01)  HR: 82 (07-05-25 @ 09:11) (75 - 130)  BP: 120/64 (07-05-25 @ 09:11) (106/59 - 122/89)  RR: 18 (07-05-25 @ 09:11) (17 - 18)  SpO2: 100% (07-05-25 @ 09:11) (98% - 100%)  Wt(kg): --   BMI (kg/m2): 28.5 (06-28-25 @ 06:55)  I&O's Summary    04 Jul 2025 07:01  -  05 Jul 2025 07:00  --------------------------------------------------------  IN: 2070 mL / OUT: 1208.5 mL / NET: 861.5 mL    05 Jul 2025 07:01  -  05 Jul 2025 13:02  --------------------------------------------------------  IN: 400 mL / OUT: 400 mL / NET: 0 mL        Gen: NAD  HEENT:  (-)icterus (-)pallor  CV: N S1 S2 1/6 TEN (+)2 Pulses B/l  Resp:  Clear to auscultation B/L, normal effort  GI: (+) BS Soft, NT, ND  Lymph:  (-)Edema, (-)obvious lymphadenopathy  Skin: Warm to touch, Normal turgor  Psych: Appropriate mood and affect      TELEMETRY: 	  NSR, Afib    ECG:  	AFIB    RADIOLOGY:         CXR:     ASSESSMENT/PLAN: 	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 He reports the symptoms began on Tuesday while at chemotherapy with crampy abdominal pain associated with nausea. He has been otherwise tolerating PEG tube feeds. Denies fevers at home. No diarrhea. Never had similar episodes. Had a colonoscopy 5 years ago which showed benign polyps. In the ED, patient was noted to be febrile to 100.7 and in SVT with HR in 170s. Given beta blockers, antibiotics and IVF resuscitation with improvement. CT scan of diverticulitis with small abscess, now s/p IR drainage of pericolonic abscess 07/03.      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 He reports the symptoms began on Tuesday while at chemotherapy with crampy abdominal pain associated with nausea. He has been otherwise tolerating PEG tube feeds. Denies fevers at home. No diarrhea. Never had similar episodes. Had a colonoscopy 5 years ago which showed benign polyps. In the ED, patient was noted to be febrile to 100.7 and in SVT with HR in 170s. Given beta blockers, antibiotics and IVF resuscitation with improvement. CT scan of diverticulitis with small abscess, now s/p IR drainage of pericolonic abscess 07/03.      PAST MEDICAL & SURGICAL HISTORY:  BPH with elevated PSA  Osteoarthritis  Hyperlipidemia  Anxiety  Cancer of sinus  Malignant neoplasm of nasopharynx  History of chemotherapy  S/P radiation therapy  History of knee replacement procedure of right knee  S/P total knee replacement, left  S/P cataract surgery  H/O colonoscopy  H/O cervical biopsy  History of incisional hernia repair      MEDICATIONS:  MEDICATIONS  (STANDING):  enoxaparin Injectable 40 milliGRAM(s) SubCutaneous every 24 hours  methadone   Solution 1 milliGRAM(s) Enteral Tube every 12 hours  piperacillin/tazobactam IVPB.. 3.375 Gram(s) IV Intermittent every 8 hours  potassium chloride   Powder 20 milliEquivalent(s) Oral once  silver sulfADIAZINE 1% Cream 1 Application(s) Topical daily      Allergies: No Known Allergies    FAMILY HISTORY:  Family hx of colon cancer (Aunt)      SOCIAL HISTORY:    [ X] Non-smoker  [ ] Smoker  [ ] Alcohol    FLU VACCINE THIS YEAR STARTS IN AUGUST:  [ ] Yes    [ ] No    IF OVER 65 HAVE YOU EVER HAD A PNA VACCINE:  [ ] Yes    [ ] No       [ ] N/A      REVIEW OF SYSTEMS:  [ ]chest pain  [  ]shortness of breath  [  ]palpitations  [  ]syncope  [ ]near syncope [ ]upper extremity weakness   [ ] lower extremity weakness  [  ]diplopia  [  ]altered mental status   [  ]fevers  [ ]chills [ ]nausea  [ ]vomiting  [  ]dysphagia    [ ]abdominal pain  [ ]melena  [ ]BRBPR    [  ]epistaxis  [  ]rash    [ ]lower extremity edema        [X] All others negative	  [ ] Unable to obtain      LABS:	 	    CARDIAC MARKERS:  CARDIAC MARKERS ( 05 Jul 2025 03:38 )  x     / x     / x     / x     / 3.6 ng/mL  CARDIAC MARKERS ( 02 Jul 2025 16:30 )  x     / x     / x     / x     / 4.4 ng/mL                              9.4    5.56  )-----------( 174      ( 05 Jul 2025 03:38 )             29.3     Hb Trend: 9.4<--    07-05    137  |  99  |  16  ----------------------------<  125[H]  3.1[L]   |  26  |  0.80    Ca    8.1[L]      05 Jul 2025 03:38  Phos  1.6     07-05  Mg     2.00     07-05    TPro  5.5[L]  /  Alb  2.9[L]  /  TBili  0.2  /  DBili  x   /  AST  24  /  ALT  18  /  AlkPhos  62  07-05    Creatinine Trend: 0.80<--, 0.79<--, 0.82<--, 0.78<--, 0.82<--, 0.80<--    Coags:      proBNP:   Lipid Profile:   HgA1c:   TSH:         PHYSICAL EXAM:  T(C): 36.8 (07-05-25 @ 09:11), Max: 37.2 (07-05-25 @ 01:01)  HR: 82 (07-05-25 @ 09:11) (75 - 130)  BP: 120/64 (07-05-25 @ 09:11) (106/59 - 122/89)  RR: 18 (07-05-25 @ 09:11) (17 - 18)  SpO2: 100% (07-05-25 @ 09:11) (98% - 100%)  Wt(kg): --   BMI (kg/m2): 28.5 (06-28-25 @ 06:55)  I&O's Summary    04 Jul 2025 07:01  -  05 Jul 2025 07:00  --------------------------------------------------------  IN: 2070 mL / OUT: 1208.5 mL / NET: 861.5 mL    05 Jul 2025 07:01  -  05 Jul 2025 13:02  --------------------------------------------------------  IN: 400 mL / OUT: 400 mL / NET: 0 mL        Gen: NAD  HEENT:  (-)icterus (-)pallor  CV: N S1 S2 1/6 TEN (+)2 Pulses B/l  Resp:  Clear to auscultation B/L, normal effort  GI: (+) BS Soft, NT, ND  Lymph:  (-)Edema, (-)obvious lymphadenopathy  Skin: Warm to touch, Normal turgor  Psych: Appropriate mood and affect      TELEMETRY: 	  NSR, Afib    ECG:  	AFIB    RADIOLOGY:         CXR:     ASSESSMENT/PLAN: 	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 He reports the symptoms began on Tuesday while at chemotherapy with crampy abdominal pain associated with nausea. He has been otherwise tolerating PEG tube feeds. Denies fevers at home. No diarrhea. Never had similar episodes. Had a colonoscopy 5 years ago which showed benign polyps. In the ED, patient was noted to be febrile to 100.7 and in SVT with HR in 170s. Given beta blockers, antibiotics and IVF resuscitation with improvement. CT scan of diverticulitis with small abscess, now s/p IR drainage of pericolonic abscess 07/03.      PAFIB  - now in sinus, [id number] of 1  - check TTE  - can start low dose Metoprolol to help maintain sinus  - will need AC for CVA prevention, will need to be cleared by Surgery to start AC      Alvin De Luna MD  Pager: [phone]  Office: [phone]

## 2025-07-06 NOTE — PROVIDER CONTACT NOTE (CRITICAL VALUE NOTIFICATION) - SITUATION
abscess culture 7/3, gram stain: moderate polymorphonuclear leukocytes seen per low power field. Moderate Gram Negative coccobacilli seen per oil power field. moderate gram positive cocci in pairs seen per oil power field.
Pt potassium level 2.6

## 2025-07-06 NOTE — PROGRESS NOTE ADULT - SUBJECTIVE AND OBJECTIVE BOX
TEAM [ A ] Surgery Daily Progress Note  =====================================================    SUBJECTIVE: Patient seen and examined at bedside on AM rounds. Patient reports feeling well, pain controlled on current regimen. Tolerating tube feeds, denies nausea, vomiting.    ALLERGIES:  No Known Allergies      --------------------------------------------------------------------------------------    MEDICATIONS:    Neurologic Medications  acetaminophen   IVPB .. 1000 milliGRAM(s) IV Intermittent every 6 hours PRN Mild Pain (1 - 3)  methadone   Solution 1 milliGRAM(s) Enteral Tube every 12 hours    Respiratory Medications    Cardiovascular Medications  metoprolol tartrate 25 milliGRAM(s) Enteral Tube two times a day    Gastrointestinal Medications  magnesium sulfate  IVPB 2 Gram(s) IV Intermittent once  potassium chloride  10 mEq/100 mL IVPB 10 milliEquivalent(s) IV Intermittent every 1 hour  potassium phosphate IVPB 15 milliMole(s) IV Intermittent once    Genitourinary Medications    Hematologic/Oncologic Medications  enoxaparin Injectable 40 milliGRAM(s) SubCutaneous every 24 hours    Antimicrobial/Immunologic Medications  piperacillin/tazobactam IVPB.. 3.375 Gram(s) IV Intermittent every 8 hours    Endocrine/Metabolic Medications    Topical/Other Medications  silver sulfADIAZINE 1% Cream 1 Application(s) Topical daily  sodium chloride 0.65% Nasal 1 Spray(s) Both Nostrils four times a day PRN Nasal Congestion    --------------------------------------------------------------------------------------    VITAL SIGNS:  T(C): 36.8 (07-06-25 @ 04:39), Max: 36.8 (07-05-25 @ 09:11)  HR: 86 (07-06-25 @ 06:27) (77 - 88)  BP: 119/77 (07-06-25 @ 06:27) (117/67 - 146/73)  RR: 18 (07-06-25 @ 04:39) (18 - 20)  SpO2: 99% (07-06-25 @ 04:39) (99% - 100%)  --------------------------------------------------------------------------------------    EXAM    General: NAD, resting in bed comfortably.  Cardiac: regular rate, warm and well perfused  Respiratory: Nonlabored respirations, normal cw expansion.  Abdomen: soft, nontender, nondistended.  Extremities: normal strength, FROM, no deformities    --------------------------------------------------------------------------------------    LABS      --------------------------------------------------------------------------------------    INS AND OUTS:    07-05-25 @ 07:01  -  07-06-25 @ 07:00  --------------------------------------------------------  IN: 900 mL / OUT: 803 mL / NET: 97 mL      --------------------------------------------------------------------------------------

## 2025-07-06 NOTE — PROVIDER CONTACT NOTE (CRITICAL VALUE NOTIFICATION) - TEST AND RESULT REPORTED:
potassium 2.6
abcess culture 7/3, gram stain: moderate polymorophonuclear leukocytes seen seen per low power field. Moderate Gram Negative coccobacilli seen per oil power field. moderate gram positive cocci in pairs seen per oil power field.

## 2025-07-06 NOTE — PROGRESS NOTE ADULT - ASSESSMENT
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 Had SVT  managed with vagal maneuvres. Now on the floor for diverticulitis management, slightly worsening repeat CT scan of diverticulitis with small abscess, now s/p IR drainage of pericolonic abscess 07/03.    Plan:  - Cardiology recs       - started on low dose metrop to help maintain sinus       - f/u on TTE       - will need AC for CVA prevention  - drain 3 ml output,  Fu with IR re dressing changes and drain management   - Bolus tube feeds  - Replete lytes   - monitor temps    A team: [id number]

## 2025-07-06 NOTE — PROGRESS NOTE ADULT - SUBJECTIVE AND OBJECTIVE BOX
Patient is a 75y old  Male who presents with a chief complaint of diverticulitis (06 Jul 2025 08:52)      DATE OF SERVICE: 07-06-25 @ 10:51    SUBJECTIVE / OVERNIGHT EVENTS: overnight events noted    ROS:  Resp: No cough no sputum production  CVS: No chest pain no palpitations no orthopnea  GI: no N/V/D  family at bedside     MEDICATIONS  (STANDING):  enoxaparin Injectable 40 milliGRAM(s) SubCutaneous every 24 hours  methadone   Solution 1 milliGRAM(s) Enteral Tube every 12 hours  metoprolol tartrate 25 milliGRAM(s) Enteral Tube two times a day  piperacillin/tazobactam IVPB.. 3.375 Gram(s) IV Intermittent every 8 hours  potassium chloride  10 mEq/100 mL IVPB 10 milliEquivalent(s) IV Intermittent every 1 hour  potassium phosphate IVPB 15 milliMole(s) IV Intermittent once  silver sulfADIAZINE 1% Cream 1 Application(s) Topical daily    MEDICATIONS  (PRN):  acetaminophen   IVPB .. 1000 milliGRAM(s) IV Intermittent every 6 hours PRN Mild Pain (1 - 3)  sodium chloride 0.65% Nasal 1 Spray(s) Both Nostrils four times a day PRN Nasal Congestion        CAPILLARY BLOOD GLUCOSE        I&O's Summary    05 Jul 2025 07:01  -  06 Jul 2025 07:00  --------------------------------------------------------  IN: 900 mL / OUT: 803 mL / NET: 97 mL    06 Jul 2025 07:01  -  06 Jul 2025 10:51  --------------------------------------------------------  IN: 0 mL / OUT: 152.5 mL / NET: -152.5 mL        Vital Signs Last 24 Hrs  T(C): 37.3 (06 Jul 2025 09:07), Max: 37.3 (06 Jul 2025 09:07)  T(F): 99.2 (06 Jul 2025 09:07), Max: 99.2 (06 Jul 2025 09:07)  HR: 77 (06 Jul 2025 09:07) (77 - 88)  BP: 114/64 (06 Jul 2025 09:07) (114/64 - 146/73)  BP(mean): --  RR: 16 (06 Jul 2025 09:07) (16 - 20)  SpO2: 97% (06 Jul 2025 09:07) (97% - 100%)    PHYSICAL EXAM:  CHEST/LUNG: clear   HEART: S1 S2; no murmurs   ABDOMEN: Soft, nontender   EXTREMITIES: no edema  NEUROLOGY: Alert non-focal      LABS:                        9.5    10.24 )-----------( 166      ( 06 Jul 2025 04:30 )             28.7     07-06    135  |  96[L]  |  11  ----------------------------<  110[H]  2.6[LL]   |  28  |  0.74    Ca    8.1[L]      06 Jul 2025 04:30  Phos  2.3     07-06  Mg     1.70     07-06    TPro  5.5[L]  /  Alb  2.9[L]  /  TBili  0.2  /  DBili  x   /  AST  24  /  ALT  18  /  AlkPhos  62  07-05      CARDIAC MARKERS ( 05 Jul 2025 03:38 )  x     / x     / x     / x     / 3.6 ng/mL      Urinalysis Basic - ( 06 Jul 2025 04:30 )    Color: x / Appearance: x / SG: x / pH: x  Gluc: 110 mg/dL / Ketone: x  / Bili: x / Urobili: x   Blood: x / Protein: x / Nitrite: x   Leuk Esterase: x / RBC: x / WBC x   Sq Epi: x / Non Sq Epi: x / Bacteria: x          All consultant(s) notes reviewed and care discussed with other providers        Contact Number, Dr Mcnulty [phone]

## 2025-07-06 NOTE — PROGRESS NOTE ADULT - SUBJECTIVE AND OBJECTIVE BOX
DATE OF SERVICE: 07-06-25    Patient denies chest pain or shortness of breath.   Review of symptoms otherwise negative.    MEDICATIONS:  acetaminophen   IVPB .. 1000 milliGRAM(s) IV Intermittent every 6 hours PRN  enoxaparin Injectable 40 milliGRAM(s) SubCutaneous every 24 hours  methadone   Solution 1 milliGRAM(s) Enteral Tube every 12 hours  metoprolol tartrate 25 milliGRAM(s) Enteral Tube two times a day  piperacillin/tazobactam IVPB.. 3.375 Gram(s) IV Intermittent every 8 hours  potassium phosphate IVPB 15 milliMole(s) IV Intermittent once  silver sulfADIAZINE 1% Cream 1 Application(s) Topical daily  sodium chloride 0.65% Nasal 1 Spray(s) Both Nostrils four times a day PRN      LABS:                        9.5    10.24 )-----------( 166      ( 06 Jul 2025 04:30 )             28.7       Hemoglobin: 9.5 g/dL (07-06 @ 04:30)  Hemoglobin: 9.4 g/dL (07-05 @ 03:38)  Hemoglobin: 9.6 g/dL (07-04 @ 04:49)  Hemoglobin: 10.5 g/dL (07-03 @ 05:45)  Hemoglobin: 10.0 g/dL (07-02 @ 16:30)      07-06    135  |  96[L]  |  11  ----------------------------<  110[H]  2.6[LL]   |  28  |  0.74    Ca    8.1[L]      06 Jul 2025 04:30  Phos  2.3     07-06  Mg     1.70     07-06    TPro  5.5[L]  /  Alb  2.9[L]  /  TBili  0.2  /  DBili  x   /  AST  24  /  ALT  18  /  AlkPhos  62  07-05    Creatinine Trend: 0.74<--, 0.80<--, 0.79<--, 0.82<--, 0.78<--, 0.82<--    COAGS:     CARDIAC MARKERS ( 05 Jul 2025 03:38 )  x     / x     / x     / x     / 3.6 ng/mL        PHYSICAL EXAM:  T(C): 37.3 (07-06-25 @ 09:07), Max: 37.3 (07-06-25 @ 09:07)  HR: 77 (07-06-25 @ 09:07) (77 - 88)  BP: 114/64 (07-06-25 @ 09:07) (114/64 - 146/73)  RR: 16 (07-06-25 @ 09:07) (16 - 20)  SpO2: 97% (07-06-25 @ 09:07) (97% - 100%)  Wt(kg): --    I&O's Summary    05 Jul 2025 07:01  -  06 Jul 2025 07:00  --------------------------------------------------------  IN: 900 mL / OUT: 803 mL / NET: 97 mL    06 Jul 2025 07:01  -  06 Jul 2025 11:58  --------------------------------------------------------  IN: 0 mL / OUT: 152.5 mL / NET: -152.5 mL    Gen: NAD  HEENT:  (-)icterus (-)pallor  CV: N S1 S2 1/6 TEN (+)2 Pulses B/l  Resp:  Clear to auscultation B/L, normal effort  GI: (+) BS Soft, NT, ND  Lymph:  (-)Edema, (-)obvious lymphadenopathy  Skin: Warm to touch, Normal turgor  Psych: Appropriate mood and affect        TELEMETRY: 	  NSR, Afib    ECG:  	AFIB    RADIOLOGY:         CXR:     ASSESSMENT/PLAN: 	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 He reports the symptoms began on Tuesday while at chemotherapy with crampy abdominal pain associated with nausea. He has been otherwise tolerating PEG tube feeds. Denies fevers at home. No diarrhea. Never had similar episodes. Had a colonoscopy 5 years ago which showed benign polyps. In the ED, patient was noted to be febrile to 100.7 and in SVT with HR in 170s. Given beta blockers, antibiotics and IVF resuscitation with improvement. CT scan of diverticulitis with small abscess, now s/p IR drainage of pericolonic abscess 07/03.      PAFIB  - now in sinus, [id number] of 1  - check TTE  - c/w low dose Metoprolol to help maintain sinus  - will need AC for CVA prevention, will need to be cleared by Surgery to start AC, pln for drain removal tomorrow      Alvin De Luna MD  Pager: [phone]  Office: [phone]

## 2025-07-07 ENCOUNTER — TRANSCRIPTION ENCOUNTER (OUTPATIENT)
Age: 75
End: 2025-07-07

## 2025-07-07 ENCOUNTER — RESULT REVIEW (OUTPATIENT)
Age: 75
End: 2025-07-07

## 2025-07-07 NOTE — PROGRESS NOTE ADULT - SUBJECTIVE AND OBJECTIVE BOX
Patient is a 75y old  Male who presents with a chief complaint of diverticulitis (07 Jul 2025 09:40)      DATE OF SERVICE: 07-07-25 @ 11:41    SUBJECTIVE / OVERNIGHT EVENTS: overnight events noted    ROS:  Resp: No cough no sputum production  CVS: No chest pain no palpitations no orthopnea  GI: no N/V/D  wife at bedside     MEDICATIONS  (STANDING):  enoxaparin Injectable 40 milliGRAM(s) SubCutaneous every 24 hours  methadone   Solution 1 milliGRAM(s) Enteral Tube every 12 hours  metoprolol tartrate 25 milliGRAM(s) Enteral Tube two times a day  piperacillin/tazobactam IVPB.. 3.375 Gram(s) IV Intermittent every 8 hours  silver sulfADIAZINE 1% Cream 1 Application(s) Topical daily    MEDICATIONS  (PRN):  acetaminophen   IVPB .. 1000 milliGRAM(s) IV Intermittent every 6 hours PRN Mild Pain (1 - 3)  sodium chloride 0.65% Nasal 1 Spray(s) Both Nostrils four times a day PRN Nasal Congestion        CAPILLARY BLOOD GLUCOSE        I&O's Summary    06 Jul 2025 07:01  -  07 Jul 2025 07:00  --------------------------------------------------------  IN: 1850 mL / OUT: 1061.5 mL / NET: 788.5 mL    07 Jul 2025 07:01  -  07 Jul 2025 11:41  --------------------------------------------------------  IN: 0 mL / OUT: 250 mL / NET: -250 mL        Vital Signs Last 24 Hrs  T(C): 37 (07 Jul 2025 09:48), Max: 37.3 (06 Jul 2025 20:00)  T(F): 98.6 (07 Jul 2025 09:48), Max: 99.1 (06 Jul 2025 20:00)  HR: 80 (07 Jul 2025 09:48) (80 - 87)  BP: 133/71 (07 Jul 2025 09:48) (108/59 - 133/71)  BP(mean): --  RR: 18 (07 Jul 2025 09:48) (16 - 18)  SpO2: 100% (07 Jul 2025 09:48) (97% - 100%)    PHYSICAL EXAM:  CHEST/LUNG: clear   HEART: S1 S2; no murmurs   ABDOMEN: Soft, nontender   EXTREMITIES: no edema  NEUROLOGY: Alert non-focal    LABS:                        9.5    8.22  )-----------( 172      ( 07 Jul 2025 04:10 )             28.4     07-07    136  |  99  |  10  ----------------------------<  118[H]  3.4[L]   |  25  |  0.77    Ca    8.4      07 Jul 2025 04:10  Phos  2.6     07-07  Mg     2.00     07-07            Urinalysis Basic - ( 07 Jul 2025 04:10 )    Color: x / Appearance: x / SG: x / pH: x  Gluc: 118 mg/dL / Ketone: x  / Bili: x / Urobili: x   Blood: x / Protein: x / Nitrite: x   Leuk Esterase: x / RBC: x / WBC x   Sq Epi: x / Non Sq Epi: x / Bacteria: x          All consultant(s) notes reviewed and care discussed with other providers        Contact Number, Dr Mcnulty [phone]

## 2025-07-07 NOTE — CONSULT NOTE ADULT - SUBJECTIVE AND OBJECTIVE BOX
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 He reports the symptoms began on Tuesday while at chemotherapy with crampy abdominal pain associated with nausea. He has been otherwise tolerating PEG tube feeds. Denies fevers at home. No diarrhea. Never had similar episodes. Had a colonoscopy 5 years ago which showed benign polyps.   In the ED, patient was noted to be febrile to 100.7 and in SVT with HR in 170s. Given beta blockers, antibiotics and IVF resuscitation with improvement (28 Jun 2025 05:04)  No prior history of palpitations or fainting. A 10 pt ROS is otherwise negative.    PAST MEDICAL & SURGICAL HISTORY:  BPH with elevated PSA  Osteoarthritis  Hyperlipidemia  Anxiety  Cancer of sinus  Malignant neoplasm of nasopharynx  History of chemotherapy  S/P radiation therapy  History of knee replacement procedure of right knee  S/P total knee replacement, left  S/P cataract surgery  H/O colonoscopy  H/O cervical biopsy  History of incisional hernia repair      MEDICATIONS  (STANDING):  enoxaparin Injectable 40 milliGRAM(s) SubCutaneous every 24 hours  methadone   Solution 1 milliGRAM(s) Enteral Tube every 12 hours  metoprolol tartrate 25 milliGRAM(s) Enteral Tube two times a day  piperacillin/tazobactam IVPB.. 3.375 Gram(s) IV Intermittent every 8 hours  silver sulfADIAZINE 1% Cream 1 Application(s) Topical daily      Allergies    No Known Allergies    Intolerances      FAMILY HISTORY:  Family hx of colon cancer (Aunt)    Non-contributary for premature coronary disease or sudden cardiac death    SOCIAL HISTORY:    [ x] Non-smoker  [ ] Smoker  [ ] Alcohol      PHYSICAL EXAM:  T(C): 37.1 (07-07-25 @ 13:47), Max: 37.3 (07-06-25 @ 20:00)  HR: 87 (07-07-25 @ 13:47) (80 - 87)  BP: 132/68 (07-07-25 @ 13:47) (109/67 - 133/71)  RR: 18 (07-07-25 @ 13:47) (16 - 18)  SpO2: 99% (07-07-25 @ 13:47) (97% - 100%)  Wt(kg): --    Appearance: Normal appearing adult man in no acute distress, prior nasopharyngeal CA.	  HEENT:   Normal oral mucosa, PERRL, EOMI	  Lymphatic: No lymphadenopathy , no edema  Cardiovascular: Normal S1 S2, No JVD, No murmurs , Peripheral pulses palpable 2+ bilaterally  Respiratory: Lungs clear to auscultation, normal effort 	  Gastrointestinal:  Soft, Non-tender, + BS	  Skin: No rashes, No ecchymoses, No cyanosis, warm to touch  Musculoskeletal: Normal range of motion, normal strength  Psychiatry:  Mood & affect appropriate      TELEMETRY: sinus, APCs, runs of PAT. 	    ECG: atrial tachycardia 165bpm. 	  	  	  LABS:	 	                          9.5    8.22  )-----------( 172      ( 07 Jul 2025 04:10 )             28.4     07-07    136  |  99  |  10  ----------------------------<  118[H]  3.4[L]   |  25  |  0.77    Ca    8.4      07 Jul 2025 04:10  Phos  2.6     07-07  Mg     2.00     07-07    ASSESSMENT/PLAN: Mr Benral is a 75y Male admitted with facial pain.  EP called re: SVT in the emergency department.  EKG c/w an atrial tachycardia.  Some visible P-waves with fluctuation of the heartrate.  No sustained AFib.  Ongoing EKG telemetry upstairs with short bursts of PAT.  Recommend to regular electrolytes, volume status, treat fever, and use beta blockers to suppress arrhythmia.  (Continue metoprolol via enteral tube).  At this time, would avoid ablation, and try medical management first.  Will follow with you.      Mik Iverson M.D.  Cardiac Electrophysiology    office [phone]  pager [phone]

## 2025-07-07 NOTE — PROGRESS NOTE ADULT - NSPROGADDITIONALINFOA_GEN_ALL_CORE
discussed with patient's family at bedside in detail
discussed with patient's wife at bedside in detail

## 2025-07-07 NOTE — PROGRESS NOTE ADULT - ASSESSMENT
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  CT w/ severe acute sigmoid colon diverticulitis with perforation, no abscess.   Pt admitted to SICU, started on Cipro/flagyl with improvement, transferred to floors on 6/30.     BCx w/ pan sensitive E Coli in 1/2 BCx, repeat negative. C/b RRT on 7/2, repeat CT w/ abscess now, s/p IR drain on 7/3, Cx w/ strep anginosus.     #Abscess s/p IR drain Cx w/ strep anginosus  #E Coli bacteremia 2/2 perforated diverticulitis   #Nasopharyngeal carcinoma on chemotherapy     Recommendations:   1. Can change to Ceftriaxone 2 g q24, Flagyl 500 mg BID  2. Agree with tube check     Thank you for consulting us and involving us in the management of this patient's case. In addition to reviewing history, imaging, documents, labs, microbiology, and infection control strategies and potential issues.     ID will continue to follow    Rell Reyes M.D.  Attending Physician  Division of Infectious Diseases  Department of Medicine    Please contact through MS Teams message.  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 on 6/28.  CT w/ severe acute sigmoid colon diverticulitis with perforation, no abscess.   Pt admitted to SICU, started on Cipro/flagyl with improvement, transferred to floors on 6/30.     BCx w/ pan sensitive E Coli in 1/2 BCx, repeat negative. C/b RRT on 7/2, repeat CT w/ abscess now, s/p IR drain on 7/3, Cx w/ strep anginosus.     #Abscess s/p IR drain Cx w/ strep anginosus  #E Coli bacteremia 2/2 perforated diverticulitis   #Nasopharyngeal carcinoma on chemotherapy     Recommendations:   1. Can change to Ceftriaxone 2 g q24, Flagyl 500 mg BID. When ready for d/c Cefpodoxime 400 mg BID, Flagyl 500 mg BID until 7/12 for 10 days of abx post IR drain.    2. Agree with tube check     Thank you for consulting us and involving us in the management of this patient's case. In addition to reviewing history, imaging, documents, labs, microbiology, and infection control strategies and potential issues.     ID will continue to follow    Rell Reyes M.D.  Attending Physician  Division of Infectious Diseases  Department of Medicine    Please contact through MS Teams message.  Office: [phone] (after 5 PM or weekend)

## 2025-07-07 NOTE — PROGRESS NOTE ADULT - SUBJECTIVE AND OBJECTIVE BOX
75y Male s/p diverticular abscess drainage on 6/3 in Interventional Radiology.     Patient seen and examined at bedside w/ wife, resting comfortably. No complaints offered at this time.   AM labs reviewed: H/H stable, no leukocytosis.  Otherwise, VSS.    T(F): 98.6 (25 @ 09:48), Max: 99.1 (25 @ 20:00)  HR: 80 (25 @ 09:48) (80 - 84)  BP: 133/71 (25 @ 09:48) (109/67 - 133/71)  RR: 18 (25 @ 09:48) (16 - 18)  SpO2: 100% (25 @ 09:48) (97% - 100%)    LABS:                     9.5    8.22  )-----------( 172      ( 2025 04:10 )             28.4       136  |  99  |  10  ----------------------------<  118[H]  3.4[L]   |  25  |  0.77    Ca    8.4      2025 04:10  Phos  2.6     07-07  Mg     2.00     07-    I&O's Detail  2025 07:01  -  2025 07:00  --------------------------------------------------------  IN:    IV PiggyBack: 100 mL    Miscellaneous Tube Feedin mL    Oral Fluid: 450 mL  Total IN: 1850 mL  OUT:    Bulb (mL): 11.5 mL    Voided (mL): 1050 mL  Total OUT: 1061.5 mL  Total NET: 788.5 mL    2025 07:01  -  2025 12:39  --------------------------------------------------------  IN:  Total IN: 0 mL  OUT:    Oral Fluid: 0 mL    Voided (mL): 250 mL  Total OUT: 250 mL  Total NET: -250 mL      PHYSICAL EXAM:  General: Nontoxic, in NAD  Left lower quadrant drain: Dressing noted to be soiled. Applied new dressing at bedside. Mild tenderness to palpation over insertion site. No erythema, edema, or hematoma noted at insertion site. Flushed with 5cc NS without resistance, mild leakage noted at insertion site. 75y Male s/p diverticular abscess drainage on 7/3 in Interventional Radiology.     Patient seen and examined at bedside w/ wife, resting comfortably. No complaints offered at this time.   AM labs reviewed: H/H stable, no leukocytosis.  Otherwise, VSS.    T(F): 98.6 (25 @ 09:48), Max: 99.1 (25 @ 20:00)  HR: 80 (25 @ 09:48) (80 - 84)  BP: 133/71 (25 @ 09:48) (109/67 - 133/71)  RR: 18 (25 @ 09:48) (16 - 18)  SpO2: 100% (25 @ 09:48) (97% - 100%)    LABS:                     9.5    8.22  )-----------( 172      ( 2025 04:10 )             28.4       136  |  99  |  10  ----------------------------<  118[H]  3.4[L]   |  25  |  0.77    Ca    8.4      2025 04:10  Phos  2.6     07-07  Mg     2.00     07-    I&O's Detail  2025 07:01  -  2025 07:00  --------------------------------------------------------  IN:    IV PiggyBack: 100 mL    Miscellaneous Tube Feedin mL    Oral Fluid: 450 mL  Total IN: 1850 mL  OUT:    Bulb (mL): 11.5 mL    Voided (mL): 1050 mL  Total OUT: 1061.5 mL  Total NET: 788.5 mL    2025 07:01  -  2025 12:39  --------------------------------------------------------  IN:  Total IN: 0 mL  OUT:    Oral Fluid: 0 mL    Voided (mL): 250 mL  Total OUT: 250 mL  Total NET: -250 mL      PHYSICAL EXAM:  General: Nontoxic, in NAD  Left lower quadrant drain: Dressing noted to be soiled. Applied new dressing at bedside. Mild tenderness to palpation over insertion site. No erythema, edema, or hematoma noted at insertion site. Flushed with 5cc NS without resistance, mild leakage noted at insertion site.

## 2025-07-07 NOTE — DISCHARGE NOTE PROVIDER - NSDCMRMEDTOKEN_GEN_ALL_CORE_FT
atorvastatin 40 mg oral tablet: 1 tab(s) orally once a day  methadone 10 mg/5 mL oral solution: 2 milliliter(s) orally 2 times a day  metoclopramide 5 mg/5 mL oral syrup: 10 milliliter(s) by gastrostomy tube 4 times a day  metroNIDAZOLE 500 mg/5 mL oral suspension: 500 milligram(s) by gastrostomy tube 3 times a day  oxyCODONE 5 mg/5 mL oral solution: 5 milliliter(s) by gastrostomy tube every 6 hours as needed for  moderate pain  Reglan 10 mg oral tablet: 1 tab(s) orally once a day as needed for  nausea  Tylenol 500 mg oral tablet: 2 tab(s) orally 3 times a day as needed for  mild pain   atorvastatin 40 mg oral tablet: 1 tab(s) orally once a day  cefpodoxime 100 mg/5 mL oral liquid: 5 milliliter(s) by gastrostomy tube 2 times a day  methadone 10 mg/5 mL oral solution: 2 milliliter(s) orally 2 times a day  metoclopramide 5 mg/5 mL oral syrup: 10 milliliter(s) by gastrostomy tube 4 times a day  metoprolol tartrate 25 mg oral tablet: 1 tab(s) orally 2 times a day  metroNIDAZOLE 500 mg/5 mL oral suspension: 5 milliliter(s) by gastrostomy tube 3 times a day  metroNIDAZOLE 500 mg/5 mL oral suspension: 500 milligram(s) by gastrostomy tube 3 times a day  oxyCODONE 5 mg/5 mL oral solution: 5 milliliter(s) by gastrostomy tube every 6 hours as needed for  moderate pain  Tylenol 500 mg oral tablet: 2 tab(s) orally 3 times a day as needed for  mild pain   amoxicillin-clavulanate 875 mg-125 mg oral tablet: 1 tab(s) orally 2 times a day MDD: 2  atorvastatin 40 mg oral tablet: 1 tab(s) orally once a day  methadone 10 mg/5 mL oral solution: 2 milliliter(s) orally 2 times a day  metoclopramide 5 mg/5 mL oral syrup: 10 milliliter(s) by gastrostomy tube 4 times a day  metoprolol tartrate 25 mg oral tablet: 1 tab(s) orally 2 times a day  oxyCODONE 5 mg/5 mL oral solution: 5 milliliter(s) by gastrostomy tube every 6 hours as needed for  moderate pain  Tylenol 500 mg oral tablet: 2 tab(s) orally 3 times a day as needed for  mild pain   amoxicillin-clavulanate 875 mg-125 mg oral tablet: 1 tab(s) by PEG tube 2 times a day crush tablet prior to administration MDD: 2  methadone 10 mg/5 mL oral solution: 2 milliliter(s) orally 2 times a day  metoclopramide 5 mg/5 mL oral syrup: 10 milliliter(s) by gastrostomy tube 4 times a day  metoprolol tartrate 25 mg oral tablet: 1 tab(s) by gastrostomy tube 2 times a day  oxyCODONE 5 mg/5 mL oral solution: 5 milliliter(s) by gastrostomy tube every 6 hours as needed for  moderate pain  Tylenol 500 mg oral tablet: 2 tab(s) orally 3 times a day as needed for  mild pain

## 2025-07-07 NOTE — DISCHARGE NOTE PROVIDER - CARE PROVIDER_API CALL
Sadi Billingsley (DO)  Surgery (General Surgery)  [address], [address]  [place] [address]  Phone: [phone]  Fax: [phone]  Follow Up Time: 1 week   Sadi Billingsley (DO)  Surgery (General Surgery)  [address], [address]  [place] [address]  Phone: [phone]  Fax: [phone]  Follow Up Time: 1 week    Alvin De Luna  Interventional Cardiology  [address] [place] [address] [address]  [place] [address]  Phone: [phone]  Fax: [phone]  Follow Up Time: 2 weeks

## 2025-07-07 NOTE — CONSULT NOTE ADULT - REASON FOR ADMISSION
diverticulitis

## 2025-07-07 NOTE — DISCHARGE NOTE PROVIDER - PROVIDER TOKENS
PROVIDER:[TOKEN:[[id number]],FOLLOWUP:[1 week]] PROVIDER:[TOKEN:[[id number]],FOLLOWUP:[1 week]],PROVIDER:[TOKEN:[[id number]],FOLLOWUP:[2 weeks]]

## 2025-07-07 NOTE — DISCHARGE NOTE PROVIDER - HOSPITAL COURSE
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 He reports the symptoms began on Tuesday while at chemotherapy with crampy abdominal pain associated with nausea. He has been otherwise tolerating PEG tube feeds. Denies fevers at home. No diarrhea. Never had similar episodes. Had a colonoscopy 5 years ago which showed benign polyps.     In the ED, patient was noted to be febrile to 100.7 and in SVT with HR in 170s. Given beta blockers, antibiotics and IVF resuscitation with improvement, and SVT returned vagal maneuvers attempted and SVT improved.    SICU was consulted for hemodynamic monitoring. Patient was listed for the floors and brought back down to SICU shortly after for recurrent SVTs.     ID was consulted for antibiotic recommendations for diverticulitis w GNR bacteremia; recommend 14 days abx total.     S/p diverticular abscess drain placement by IR on 7/3.     Cardiology/EP was consulted for arrhythmias; recommended lose dose metoprolol to maintain sinus and outpatient holter monitor for afib burden.     Patient tolerated operation well and there were no post-operative complications identified. Patient remained hemodynamically stable in the PACU and transferred to the surgical floor. Diet was restarted and advanced as tolerated. Pain control was transitioned from IV to PO pain meds. At this time, patient is currently ambulating, voiding, tolerating a regular diet. Patient has been deemed stable for discharge home with follow up as an outpatient. 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 He reports the symptoms began on Tuesday while at chemotherapy with crampy abdominal pain associated with nausea. He has been otherwise tolerating PEG tube feeds. Denies fevers at home. No diarrhea. Never had similar episodes. Had a colonoscopy 5 years ago which showed benign polyps.     In the ED, patient was noted to be febrile to 100.7 and in SVT with HR in 170s. Given beta blockers, antibiotics and IVF resuscitation with improvement, and SVT returned vagal maneuvers attempted and SVT improved.    SICU was consulted for hemodynamic monitoring. Patient was listed for the floors and brought back down to SICU shortly after for recurrent SVTs.     ID was consulted for antibiotic recommendations for diverticulitis w GNR bacteremia; recommend 14 days abx total.   On 7/2 Patient with fever, tachycardia CT found with diverticular abscess  S/p diverticular abscess drain placement by IR on 7/3.     Cardiology/EP was consulted for arrhythmias; recommended lose dose metoprolol to maintain sinus and outpatient holter monitor for afib burden.   D/W Cardiology patient can be discharge. Cardiology will reach out to patient to schedule for follow up.    Pt. seen by IR prior to d/c recommend for drain to remain and patient to follow up as outpatient   Patient scheduled for outpatient CT scan and tube check on 7/17 at 10am in room 263A. Outpatient IR office [phone].     At this time, patient is currently ambulating, voiding, tolerating a tube feed. Patient has been deemed stable for discharge home with follow up as an outpatient.

## 2025-07-07 NOTE — CONSULT NOTE ADULT - CONSULT REQUESTED DATE/TIME
07-Jul-2025 14:56
02-Jul-2025 17:00
28-Jun-2025 12:23
28-Jun-2025 14:47
05-Jul-2025 13:02
28-Jun-2025 06:24
01-Jul-2025 14:37
03-Jul-2025 11:40
02-Jul-2025 10:07

## 2025-07-07 NOTE — PROGRESS NOTE ADULT - SUBJECTIVE AND OBJECTIVE BOX
TEAM [ A ] Surgery Daily Progress Note  =====================================================    SUBJECTIVE: Patient seen and examined at bedside on AM rounds. Patient reports feeling well, pain controlled on current regimen.Tolerating tube feeds, denies nausea, vomiting.  Denies fever, chills.    ALLERGIES:  No Known Allergies      --------------------------------------------------------------------------------------    MEDICATIONS:    Neurologic Medications  acetaminophen   IVPB .. 1000 milliGRAM(s) IV Intermittent every 6 hours PRN Mild Pain (1 - 3)  methadone   Solution 1 milliGRAM(s) Enteral Tube every 12 hours    Respiratory Medications    Cardiovascular Medications  metoprolol tartrate 25 milliGRAM(s) Enteral Tube two times a day    Gastrointestinal Medications    Genitourinary Medications    Hematologic/Oncologic Medications  enoxaparin Injectable 40 milliGRAM(s) SubCutaneous every 24 hours    Antimicrobial/Immunologic Medications  piperacillin/tazobactam IVPB.. 3.375 Gram(s) IV Intermittent every 8 hours    Endocrine/Metabolic Medications    Topical/Other Medications  silver sulfADIAZINE 1% Cream 1 Application(s) Topical daily  sodium chloride 0.65% Nasal 1 Spray(s) Both Nostrils four times a day PRN Nasal Congestion    --------------------------------------------------------------------------------------    VITAL SIGNS:  T(C): 37.1 (07-07-25 @ 04:30), Max: 37.3 (07-06-25 @ 09:07)  HR: 82 (07-07-25 @ 04:30) (77 - 87)  BP: 121/68 (07-07-25 @ 04:30) (108/59 - 128/63)  RR: 18 (07-07-25 @ 04:30) (16 - 18)  SpO2: 98% (07-07-25 @ 04:30) (97% - 98%)  --------------------------------------------------------------------------------------    EXAM    General: NAD, resting in bed comfortably.  Cardiac: regular rate, warm and well perfused  Respiratory: Nonlabored respirations, normal cw expansion.  Abdomen: soft, nontender, nondistended  Extremities: normal strength, FROM, no gross deformities    --------------------------------------------------------------------------------------    LABS      --------------------------------------------------------------------------------------    INS AND OUTS:    07-06-25 @ 07:01  -  07-07-25 @ 07:00  --------------------------------------------------------  IN: 1850 mL / OUT: 1061.5 mL / NET: 788.5 mL      --------------------------------------------------------------------------------------

## 2025-07-07 NOTE — CHART NOTE - NSCHARTNOTEFT_GEN_A_CORE
Patient scheduled for outpatient CT scan and tube check on 7/17 at 10am in room 263A. Outpatient IR office [phone]

## 2025-07-07 NOTE — PROGRESS NOTE ADULT - ASSESSMENT
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 IR was consulted for diverticular abscess drainage. Pt is now s/p diverticular abscess drainage on 6/3 in Interventional Radiology.     Plan:  - Continue drainage, monitor/record output  - Flush drain with 5cc NS QD  - Please obtain repeat CT abd/pelvis w/out contrast for tube check later this week.    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 IR was consulted for diverticular abscess drainage. Pt is now s/p diverticular abscess drainage on 7/3 in Interventional Radiology.     Plan:  - Continue drainage, monitor/record output  - Flush drain with 5cc NS QD  - Please obtain repeat CT abd/pelvis w/out contrast for tube check later this week.    [id number]

## 2025-07-07 NOTE — DISCHARGE NOTE PROVIDER - NSDCFUADDAPPT_GEN_ALL_CORE_FT
Patient scheduled for outpatient CT scan and tube check on 7/17 at 10am. Outpatient IR office [phone].    Location is in [facility] on the second floor radiology Room 263. You can park at [address]. Continue to empty and record the drain output daily as you have been taught.    Patient scheduled for outpatient CT scan and tube check on 7/17 at 10am. Outpatient IR office [phone].    Location is in [facility] on the second floor radiology Room 263. You can park at [address]. Continue to empty and record the drain output daily as you have been taught.     Please follow up with Cardiologist, Dr. De Luna in 2 weeks after your discharge from the hospital. Please call [phone] to make an appointment.

## 2025-07-07 NOTE — DISCHARGE NOTE PROVIDER - NSDCFUADDINST_GEN_ALL_CORE_FT
WOUND CARE:  Please keep incisions clean and dry. Please do not Scrub or rub incisions. Do not use lotion or powder on incisions.   BATHING: You may shower and/or sponge bathe. You may use warm soapy water in the shower and rinse, pat dry.  ACTIVITY: No heavy lifting or straining. Otherwise, you may return to your usual level of physical activity. If you are taking narcotic pain medication DO NOT drive a car, operate machinery or make important decisions.  DIET: Return to your usual diet.  NOTIFY YOUR SURGEON IF YOU HAVE: any bleeding that does not stop, any pus draining from your wound(s), any fever (over 100.4 F) persistent nausea/vomiting, or if your pain is not controlled on your discharge pain medications, unable to urinate.  Please follow up with your primary care physician in one week regarding your hospitalization, bring copies of your discharge paperwork.  Please follow up with your surgeon, Dr. Billingsley. Call [phone] to make an appointment.

## 2025-07-07 NOTE — PROGRESS NOTE ADULT - ASSESSMENT
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 Had SVT  managed with vagal maneuvres. Now on the floor for diverticulitis management, slightly worsening repeat CT scan of diverticulitis with small abscess, now s/p IR drainage of pericolonic abscess 07/03.    Plan:  - Cardiology recs       - started on low dose metrop to help maintain sinus       - f/u on TTE  - Hold off AC  - minimal drain output,  Fu with IR about drain check   - drain growing strep anginosus, follow up with ID for recs  - Bolus tube feeds  - Replete lytes   - monitor temps    A team: [id number]

## 2025-07-07 NOTE — PROGRESS NOTE ADULT - SUBJECTIVE AND OBJECTIVE BOX
Infectious Diseases Follow Up:    Patient is a 75y old  Male who presents with a chief complaint of diverticulitis (07 Jul 2025 08:11)      Interval History/ROS:  No acute events, abdominal discomfort, not pain.    Allergies  No Known Allergies        ANTIMICROBIALS:  piperacillin/tazobactam IVPB.. 3.375 every 8 hours      Current Abx:     Previous Abx     OTHER MEDS:  MEDICATIONS  (STANDING):  acetaminophen   IVPB .. 1000 every 6 hours PRN  enoxaparin Injectable 40 every 24 hours  methadone   Solution 1 every 12 hours  metoprolol tartrate 25 two times a day      Vital Signs Last 24 Hrs  T(C): 37.1 (07 Jul 2025 04:30), Max: 37.3 (06 Jul 2025 20:00)  T(F): 98.7 (07 Jul 2025 04:30), Max: 99.1 (06 Jul 2025 20:00)  HR: 82 (07 Jul 2025 04:30) (80 - 87)  BP: 121/68 (07 Jul 2025 04:30) (108/59 - 128/63)  BP(mean): --  RR: 18 (07 Jul 2025 04:30) (16 - 18)  SpO2: 98% (07 Jul 2025 04:30) (97% - 98%)    Parameters below as of 07 Jul 2025 04:30  Patient On (Oxygen Delivery Method): nasal cannula  O2 Flow (L/min): 2      PHYSICAL EXAM:  GENERAL: NAD, well-developed  HEAD:  Atraumatic, Normocephalic  EYES: EOMI, conjunctiva and sclera clear  CHEST/LUNG: On RA, not in respiratory distress, clear to auscultation bilaterally;   HEART: Regular rate and rhythm;   ABDOMEN: Soft, Nontender, Nondistended; Drain w/o output                             9.5    8.22  )-----------( 172      ( 07 Jul 2025 04:10 )             28.4       07-07    136  |  99  |  10  ----------------------------<  118[H]  3.4[L]   |  25  |  0.77    Ca    8.4      07 Jul 2025 04:10  Phos  2.6     07-07  Mg     2.00     07-07        Urinalysis Basic - ( 07 Jul 2025 04:10 )    Color: x / Appearance: x / SG: x / pH: x  Gluc: 118 mg/dL / Ketone: x  / Bili: x / Urobili: x   Blood: x / Protein: x / Nitrite: x   Leuk Esterase: x / RBC: x / WBC x   Sq Epi: x / Non Sq Epi: x / Bacteria: x        MICROBIOLOGY:  v  Abscess Diverticular Abscess Drainage  07-03-25   Numerous Streptococcus anginosus "Susceptibilities not performed"  --    Moderate polymorphonuclear leukocytes seen per low power field  Moderate Gram Negative Coccobacilli seen per oil power field  Moderate Gram positive cocci in pairs seen per oil power field      Blood Blood-Arterial  07-02-25   No growth at 4 days  --  --      Blood Blood  06-29-25   No growth at 5 days  --  --      Blood Blood-Peripheral  06-29-25   No growth at 5 days  --  --      Blood Blood-Peripheral  06-27-25   Growth in anaerobic bottle: Escherichia coli  Direct identification is available within approximately 3-5  hours either by Blood Panel Multiplexed PCR or Direct  MALDI-TOF. Details: [email or web address]  --  Blood Culture PCR  Escherichia coli      Blood Blood-Peripheral  06-27-25   No growth at 5 days  --  --                RADIOLOGY:  < from: CT Abdomen and Pelvis w/ IV Cont (07.02.25 @ 17:37) >  PRESSION:  Severe colonic diverticulitis with suspected perforation as well as   abscess formation which appears slightly worse compared to prior CT.      < end of copied text >

## 2025-07-08 ENCOUNTER — APPOINTMENT (OUTPATIENT)
Dept: HEMATOLOGY ONCOLOGY | Facility: CLINIC | Age: 75
End: 2025-07-08

## 2025-07-08 ENCOUNTER — APPOINTMENT (OUTPATIENT)
Dept: INFUSION THERAPY | Facility: HOSPITAL | Age: 75
End: 2025-07-08

## 2025-07-08 ENCOUNTER — TRANSCRIPTION ENCOUNTER (OUTPATIENT)
Age: 75
End: 2025-07-08

## 2025-07-08 NOTE — DISCHARGE NOTE NURSING/CASE MANAGEMENT/SOCIAL WORK - NSDCFUADDAPPT_GEN_ALL_CORE_FT
Patient scheduled for outpatient CT scan and tube check on 7/17 at 10am. Outpatient IR office [phone].    Location is in [facility] on the second floor radiology Room 263. You can park at [address]. Continue to empty and record the drain output daily as you have been taught.

## 2025-07-08 NOTE — PROGRESS NOTE ADULT - PROBLEM SELECTOR PLAN 2
transient  now resolved  continue to monitor
resolved  continue to monitor   continue metoprolol for rate control
transient  now resolved  continue to monitor
resolved  continue to monitor   continue metoprolol for rate control  severe hypokalemia  resolved
overnight brief episode  back in NSR  discussed with cardiology  hold off on anticoagulation   continue to monitor   metoprolol for rate control
resolved  continue to monitor   continue metoprolol for rate control  severe hypokalemia  resolving  will need 40 more in addition to 45 mEq given earlier today

## 2025-07-08 NOTE — PROGRESS NOTE ADULT - PROBLEM SELECTOR PROBLEM 5
Hyperlipidemia
Nasopharynx neoplasm
Hyperlipidemia
Hyperlipidemia
Nasopharynx neoplasm
Hyperlipidemia

## 2025-07-08 NOTE — PROGRESS NOTE ADULT - PROBLEM SELECTOR PLAN 4
asymptomatic  continue Finasteride
outpatient fasting lipid panel once infective issues resolved
asymptomatic  continue Finasteride
outpatient fasting lipid panel once infective issues resolved

## 2025-07-08 NOTE — PROGRESS NOTE ADULT - SUBJECTIVE AND OBJECTIVE BOX
Patient is a 75y old  Male who presents with a chief complaint of diverticulitis (08 Jul 2025 13:02)      DATE OF SERVICE: 07-08-25 @ 13:46    SUBJECTIVE / OVERNIGHT EVENTS: overnight events noted    ROS:  Resp: No cough no sputum production  CVS: No chest pain no palpitations no orthopnea  GI: no N/V/D        MEDICATIONS  (STANDING):  enoxaparin Injectable 40 milliGRAM(s) SubCutaneous every 24 hours  methadone   Solution 1 milliGRAM(s) Enteral Tube every 12 hours  metoprolol tartrate 25 milliGRAM(s) Enteral Tube two times a day  piperacillin/tazobactam IVPB.. 3.375 Gram(s) IV Intermittent every 8 hours  silver sulfADIAZINE 1% Cream 1 Application(s) Topical daily    MEDICATIONS  (PRN):  sodium chloride 0.65% Nasal 1 Spray(s) Both Nostrils four times a day PRN Nasal Congestion        CAPILLARY BLOOD GLUCOSE        I&O's Summary    07 Jul 2025 07:01  -  08 Jul 2025 07:00  --------------------------------------------------------  IN: 2230 mL / OUT: 760 mL / NET: 1470 mL    08 Jul 2025 07:01  -  08 Jul 2025 13:46  --------------------------------------------------------  IN: 385 mL / OUT: 2.5 mL / NET: 382.5 mL        Vital Signs Last 24 Hrs  T(C): 36.9 (08 Jul 2025 09:40), Max: 37.1 (07 Jul 2025 13:47)  T(F): 98.5 (08 Jul 2025 09:40), Max: 98.8 (07 Jul 2025 13:47)  HR: 88 (08 Jul 2025 09:40) (74 - 97)  BP: 113/68 (08 Jul 2025 09:40) (103/81 - 133/74)  BP(mean): --  RR: 18 (08 Jul 2025 09:40) (17 - 18)  SpO2: 98% (08 Jul 2025 09:40) (96% - 99%)    PHYSICAL EXAM:  CHEST/LUNG: clear   HEART: S1 S2; no murmurs   ABDOMEN: Soft, nontender   EXTREMITIES: no edema  NEUROLOGY: Alert non-focal    LABS:                        9.5    8.22  )-----------( 172      ( 07 Jul 2025 04:10 )             28.4     07-08    132[L]  |  96[L]  |  12  ----------------------------<  109[H]  3.8   |  25  |  0.79    Ca    8.9      08 Jul 2025 12:34  Phos  2.6     07-07  Mg     2.00     07-07            Urinalysis Basic - ( 08 Jul 2025 12:34 )    Color: x / Appearance: x / SG: x / pH: x  Gluc: 109 mg/dL / Ketone: x  / Bili: x / Urobili: x   Blood: x / Protein: x / Nitrite: x   Leuk Esterase: x / RBC: x / WBC x   Sq Epi: x / Non Sq Epi: x / Bacteria: x          All consultant(s) notes reviewed and care discussed with other providers        Contact Number, Dr Mcnulty [phone]

## 2025-07-08 NOTE — DISCHARGE NOTE NURSING/CASE MANAGEMENT/SOCIAL WORK - PATIENT PORTAL LINK FT
You can access the FollowMyHealth Patient Portal offered by [facility] by registering at the following website: [email or web address]. By joining [employer]’s FollowMyHealth portal, you will also be able to view your health information using other applications (apps) compatible with our system.

## 2025-07-08 NOTE — CHART NOTE - NSCHARTNOTESELECT_GEN_ALL_CORE
Event Note
Event Note
Post-procedure check/Event Note
Pre Procedure note
SICU Transfer Note
Event Note
Event Note
IR/Event Note
Nutrition Follow-up Note/Nutrition Services
Nutrition Follow-up Note/Nutrition Services
numerical 0-10

## 2025-07-08 NOTE — PROGRESS NOTE ADULT - ASSESSMENT
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  CT w/ severe acute sigmoid colon diverticulitis with perforation, no abscess.   Pt admitted to SICU, started on Cipro/flagyl with improvement, transferred to floors on 6/30.     BCx w/ pan sensitive E Coli in 1/2 BCx, repeat negative. C/b RRT on 7/2, repeat CT w/ abscess now, s/p IR drain on 7/3, Cx w/ strep anginosus.     #Abscess s/p IR drain Cx w/ strep anginosus  #E Coli bacteremia 2/2 perforated diverticulitis   #Nasopharyngeal carcinoma on chemotherapy     Recommendations:   1. When ready for d/c, change to Augmentin 875/125 mg BID until 7/12   2. Change to unasyn while inpatient, will cover both E Coli in the blood and strep anginosus     Thank you for consulting us and involving us in the management of this patient's case. In addition to reviewing history, imaging, documents, labs, microbiology, and infection control strategies and potential issues.     Inpatient ID consult team will sign off.    Further changes in lab values, imaging studies, or clinical status will not be known to ID inpatient consultants unless specifically communicated by primary team.    Rell Reyes MD  Attending Physician  Division of Infectious Diseases  Department of Medicine    Please contact through MS Teams message.  Office: [phone] (after 5 PM or weekend)

## 2025-07-08 NOTE — PROGRESS NOTE ADULT - NS ATTEND AMEND GEN_ALL_CORE FT
Patient seen and examined. Agree with plan as detailed in PA/NP Note.     Outpatient HOLTER for afib burden  per surgery would like to hold of for surgery for now in setting of abscess and drain    Alvin De Luna MD  Pager: [phone]  Office: [phone]
Patient seen and examined. Agree with plan as detailed in PA/NP Note.       C/w BB, runs of SVT noted  Daughter will call to set-up outpatient EP/Cardio f/u  [id number] is 1 will hold off on AC for now after discussion with surgery as bleeding risk is high in setting of abscess will start AC as outpatient when cleared by surgery    Alvin De Luna MD  Pager: [phone]  Office: [phone]

## 2025-07-08 NOTE — PROGRESS NOTE ADULT - PROBLEM SELECTOR PROBLEM 2
Atrial fibrillation with RVR
Hyponatremia
Hyponatremia

## 2025-07-08 NOTE — PROGRESS NOTE ADULT - ATTENDING COMMENTS
Afebrile and tachycardia resolved  Abd soft, nontender   Drain scant and growing strep aginosus  Follow up ID plan for antibiotics. Check TTE per cardiology  Continue tube feeds   ?tube check prior to discharge
DATE OF SERVICE: 07-02-25 @ 14:32    Tolerating tube feeds, pain improved  labs/vitals appropriate  abd soft nt/nd    DC planning tomorrow to finish 2wk course of abx, liquid formulations  oncology fu outpatient
s/p IR drainage of pericolonic abscess  Afebrile overnight. Exam improved, less LLQ tenderness, drain scant amount  Restart tube feeds. Follow up drain cultures
DATE OF SERVICE: 06-30-25 @ 14:56    Pain improving, labs wnl  abd soft less tender  start cld/trickle feed
DATE OF SERVICE: 07-08-25 @ 14:18    Doing well  Drain with scant purulent output  abd soft nt/nd  For Dc home today, outpatient tube check  abx per ID
New onset afib overnight  States that he's feeling better  Mild LLQ tenderness, drain output scant   Follow up drain cultures  Cardiology consult - check TTE, AC eventually
No acute events, denies pain  Abd soft, nontender, UMESH drain scant  Outpatient CT and IR tube check scheduled for 7/17  Antibiotics plan per ID  Dispo planning
Sepsis secondary to perforated diverticulitis  a.  WBC WNL, afebrile, mildly tender  b. On cipr/flagyl    Sinus tachycardia r/p atrial arrhythmia  a. Likely reactive  b.  Continue monitor    Malnutrition  a.  Remains NPO  b.  PEG in place  c.  Discuss with primary service re: timing of feed advancement    History of NPCA  a.  Recent chemotherapy.  Obtain heme/o ncoloy consult pen  b.  Silvadene to necrotic neck wound.  Discussed with Dr. Romo at bedside .
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 Much of the perforation is contained in the left lower quadrant with retroperitoneal gas extending caudally all the way up to the diaphragm. No abscess.  Bladder wall thickening could possibly represent cystitis.  pt had SVT x 2 and was admitted to SICU  blood cx 6/28 with pan sensitive E-coli and repeat negative 6/29  pt was on ceftriaxone, flagyl to be discharged with cipro flagyl but had RRT 7/3 for tachycardia, fever  Repeat CT A/P 7/2 showing Severe colonic diverticulitis with suspected perforation as well as abscess formation which appears slightly worse compared to prior CT.    * agree with zosyn nw  * follow with IR for abscess drainage  * follow the repeat blood cx  * monitor CBC/diff and CMP    The above assessment and plan was discussed with the primary team    Usha Corbin MD  contact on teams  After 5pm and on weekends call [phone]
I agree with the detailed interval history, physical, and plan, which I have reviewed and edited where appropriate'; also agree with notes/assessment with my team on service.  I have personally examined the patient.  I was physically present for the key portions of the evaluation and management (E/M) service provided.  I reviewed all the pertinent data.  The patient is a critical care patient with life threatening hemodynamic and metabolic instability in SICU.  The SICU team has a constant risk benefit analyzes discussion and coordinating care with the primary team and all consultants.   The patient is in SICU with the chief complaint and diagnosis mentioned in the note.   The plan will be specified in the note.  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  AO3  Lung clear  Heart RR  Abd softly dist    PLAN  NEUROLOGIC   - Tylenol  RESPIRATORY   - Monitor SpO2 goal >92%  CARDIOVASCULAR   -MAP>65  GASTROINTESTINAL   - Diet: NPO w/ sips of water  /RENAL   - mIVF 50cc/hr  -Monitor electrolytes  HEMATOLOGIC  - Monitor H/H   - Lovenox & SCD's  INFECTIOUS DISEASE  - Monitor WBC  - Cipro and Flagyl  ENDOCRINE  - Monitor glucose  DISPO: DC
DATE OF SERVICE: 07-03-25 @ 16:35    fever yesterday, feels ok today  wbc nl  ct last night with small new abscess, decrease in RP air  abd soft with LLQ ttp, no rebound/guarding    IR consult  ID recall  Zosyn

## 2025-07-08 NOTE — PROGRESS NOTE ADULT - SUBJECTIVE AND OBJECTIVE BOX
DATE OF SERVICE: 07-08-25    Patient denies chest pain or shortness of breath.  Review of symptoms otherwise negative.    MEDICATIONS:  enoxaparin Injectable 40 milliGRAM(s) SubCutaneous every 24 hours  methadone   Solution 1 milliGRAM(s) Enteral Tube every 12 hours  metoprolol tartrate 25 milliGRAM(s) Enteral Tube two times a day  piperacillin/tazobactam IVPB.. 3.375 Gram(s) IV Intermittent every 8 hours  silver sulfADIAZINE 1% Cream 1 Application(s) Topical daily  sodium chloride 0.65% Nasal 1 Spray(s) Both Nostrils four times a day PRN                            9.5    8.22  )-----------( 172      ( 07 Jul 2025 04:10 )             28.4     136  |  99  |  10  ----------------------------<  118[H]  3.4[L]   |  25  |  0.77    Ca    8.4      07 Jul 2025 04:10  Phos  2.6     07-07  Mg     2.00     07-07    Creatinine Trend: 0.77<--, 0.79<--, 0.74<--, 0.80<--, 0.79<--, 0.82<--        T(C): 36.9 (07-08-25 @ 09:40), Max: 37.1 (07-07-25 @ 13:47)  HR: 88 (07-08-25 @ 09:40) (74 - 97)  BP: 113/68 (07-08-25 @ 09:40) (103/81 - 133/74)  RR: 18 (07-08-25 @ 09:40) (17 - 18)  SpO2: 98% (07-08-25 @ 09:40) (96% - 99%)  Wt(kg): --    I&O's Summary    07 Jul 2025 07:01  -  08 Jul 2025 07:00  --------------------------------------------------------  IN: 2230 mL / OUT: 760 mL / NET: 1470 mL    08 Jul 2025 07:01  -  08 Jul 2025 13:03  --------------------------------------------------------  IN: 0 mL / OUT: 0 mL / NET: 0 mL        Gen: NAD  HEENT:  (-)icterus (-)pallor  CV: N S1 S2 1/6 TEN (+)2 Pulses B/l  Resp:  Clear to auscultation B/L, normal effort  GI: (+) BS Soft, NT, ND  Lymph:  (-)Edema, (-)obvious lymphadenopathy  Skin: Warm to touch, Normal turgor  Psych: Appropriate mood and affect    TELEMETRY: 	  NSR, PSVT    ECG:  	AFIB    < from: TTE W or WO Ultrasound Enhancing Agent (07.07.25 @ 10:42) >  CONCLUSIONS:      1. Left ventricular systolic function is normal with an ejection fraction of 56 % by Lee's method of disks.   2. Normal right ventricular cavity size, with normal wall thickness, and normal right ventricular systolic function.   3. Normal left and right atrial size.   4. No significant valvular disease.   5. No pericardial effusion seen.   6. Estimated pulmonary artery systolic pressure is 19 mmHg.    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 He reports the symptoms began on Tuesday while at chemotherapy with crampy abdominal pain associated with nausea. He has been otherwise tolerating PEG tube feeds. Denies fevers at home. No diarrhea. Never had similar episodes. Had a colonoscopy 5 years ago which showed benign polyps. In the ED, patient was noted to be febrile to 100.7 and HR in 170s. Given beta blockers, antibiotics and IVF resuscitation with improvement. CT scan of diverticulitis with small abscess, now s/p IR drainage of pericolonic abscess 07/03.      PAFIB  - now in sinus, [id number] of 1  - TTE with normal LV/RV function  - c/w low dose Metoprolol to help maintain sinus  - will need AC for CVA prevention, await clearance by Surgery to start AC, per surgery hold of on AC for now  - EP consult appreciated  - Pt wife wishes to call and schedule a follow up in the office, [phone]      Lazara AVILES  [phone]

## 2025-07-08 NOTE — PROGRESS NOTE ADULT - SUBJECTIVE AND OBJECTIVE BOX
Infectious Diseases Follow Up:    Patient is a 75y old  Male who presents with a chief complaint of diverticulitis (07 Jul 2025 15:08)      Interval History/ROS:  No acute events, pt w/o abdominal pain     Allergies  No Known Allergies        ANTIMICROBIALS:  piperacillin/tazobactam IVPB.. 3.375 every 8 hours      Current Abx:     Previous Abx     OTHER MEDS:  MEDICATIONS  (STANDING):  enoxaparin Injectable 40 every 24 hours  methadone   Solution 1 every 12 hours  metoprolol tartrate 25 two times a day      Vital Signs Last 24 Hrs  T(C): 36.9 (08 Jul 2025 09:40), Max: 37.1 (07 Jul 2025 13:47)  T(F): 98.5 (08 Jul 2025 09:40), Max: 98.8 (07 Jul 2025 13:47)  HR: 88 (08 Jul 2025 09:40) (74 - 97)  BP: 113/68 (08 Jul 2025 09:40) (103/81 - 133/74)  BP(mean): --  RR: 18 (08 Jul 2025 09:40) (17 - 18)  SpO2: 98% (08 Jul 2025 09:40) (96% - 99%)    Parameters below as of 08 Jul 2025 09:40  Patient On (Oxygen Delivery Method): nasal cannula  O2 Flow (L/min): 2      PHYSICAL EXAM:  GENERAL: NAD, well-developed  HEAD:  Atraumatic, Normocephalic  EYES: EOMI, conjunctiva and sclera clear  CHEST/LUNG: On RA, not in respiratory distress  ABDOMEN: Soft, Nontender, Nondistended; Drain w/o output                             9.5    8.22  )-----------( 172      ( 07 Jul 2025 04:10 )             28.4       07-07    136  |  99  |  10  ----------------------------<  118[H]  3.4[L]   |  25  |  0.77    Ca    8.4      07 Jul 2025 04:10  Phos  2.6     07-07  Mg     2.00     07-07        Urinalysis Basic - ( 07 Jul 2025 04:10 )    Color: x / Appearance: x / SG: x / pH: x  Gluc: 118 mg/dL / Ketone: x  / Bili: x / Urobili: x   Blood: x / Protein: x / Nitrite: x   Leuk Esterase: x / RBC: x / WBC x   Sq Epi: x / Non Sq Epi: x / Bacteria: x        MICROBIOLOGY:  v  Abscess Diverticular Abscess Drainage  07-03-25   Numerous Streptococcus anginosus "Susceptibilities not performed"  --    Moderate polymorphonuclear leukocytes seen per low power field  Moderate Gram Negative Coccobacilli seen per oil power field  Moderate Gram positive cocci in pairs seen per oil power field      Blood Blood-Arterial  07-02-25   No growth at 5 days  --  --      Blood Blood  06-29-25   No growth at 5 days  --  --      Blood Blood-Peripheral  06-29-25   No growth at 5 days  --  --      Blood Blood-Peripheral  06-27-25   Growth in anaerobic bottle: Escherichia coli  Direct identification is available within approximately 3-5  hours either by Blood Panel Multiplexed PCR or Direct  MALDI-TOF. Details: [email or web address]  --  Blood Culture PCR  Escherichia coli      Blood Blood-Peripheral  06-27-25   No growth at 5 days  --  --                RADIOLOGY:

## 2025-07-08 NOTE — DISCHARGE NOTE NURSING/CASE MANAGEMENT/SOCIAL WORK - NSDCPNINST_GEN_ALL_CORE
Maintain incision and dressing clean dry and intact. Call MD with any signs of infection (i.e.: fever, redness, swelling, drainage), or with signs of bleeding, unrelieved increased pain, or persistent nausea. No driving while narcotic medication, it causes drowsiness and constipation. Continue to drink plenty of fluids to promote hydration. No heavy lifting, pulling, or pushing heavy objects. Teach back utilized in education process. Safety and fall prevention measures reinforced.

## 2025-07-08 NOTE — PROGRESS NOTE ADULT - PROBLEM SELECTOR PROBLEM 4
Hyperlipidemia
Hyperlipidemia
BPH with elevated PSA

## 2025-07-08 NOTE — PROGRESS NOTE ADULT - PROVIDER SPECIALTY LIST ADULT
Colorectal Surgery
SICU
SICU
Surgery
Cardiology
Cardiology
Colorectal Surgery
Infectious Disease
Surgery
Cardiology
Electrophysiology
Infectious Disease
Internal Medicine
Intervent Radiology
Surgery
Surgery
Infectious Disease
Surgery
Surgery
Internal Medicine

## 2025-07-08 NOTE — PROGRESS NOTE ADULT - SUBJECTIVE AND OBJECTIVE BOX
A TEAM SURGERY DAILY PROGRESS NOTE    STATUS POST:      POST OPERATIVE DAY #:     Vital Signs Last 24 Hrs  T(C): 36.9 (2025 09:40), Max: 37.1 (2025 13:47)  T(F): 98.5 (2025 09:40), Max: 98.8 (2025 13:47)  HR: 88 (:40) (74 - 97)  BP: 113/68 (:40) (103/81 - 133/74)  BP(mean): --  RR: 18 (:40) (17 - 18)  SpO2: 98% (:40) (96% - 99%)    Parameters below as of :40  Patient On (Oxygen Delivery Method): nasal cannula  O2 Flow (L/min): 2        SUBJECTIVE: Pt seen and examined at bedside on AM rounds. Patient reports feeling well, pain controlled on current regimen.Tolerating tube feeds, denies nausea, vomiting.  Denies fever, chills.    General: NAD, resting in bed comfortably.  Cardiac: regular rate, warm and well perfused  Respiratory: Even and unlabored breathing, on room air  Abdomen: soft, nontender, nondistended. PEG in place  Extremities: normal strength, FROM, no gross deformities    I&O's Summary    2025 07:01  -  2025 07:00  --------------------------------------------------------  IN: 2230 mL / OUT: 760 mL / NET: 1470 mL    2025 07:01  -  2025 12:18  --------------------------------------------------------  IN: 0 mL / OUT: 0 mL / NET: 0 mL      I&O's Detail    2025 07:01  -  2025 07:00  --------------------------------------------------------  IN:    Enteral Tube Flush: 180 mL    IV PiggyBack: 750 mL    Miscellaneous Tube Feedin mL  Total IN: 2230 mL    OUT:    Bulb (mL): 10 mL    Oral Fluid: 0 mL    Voided (mL): 750 mL  Total OUT: 760 mL    Total NET: 1470 mL      2025 07:01  -  2025 12:18  --------------------------------------------------------  IN:  Total IN: 0 mL    OUT:    Oral Fluid: 0 mL  Total OUT: 0 mL    Total NET: 0 mL          MEDICATIONS  (STANDING):  enoxaparin Injectable 40 milliGRAM(s) SubCutaneous every 24 hours  methadone   Solution 1 milliGRAM(s) Enteral Tube every 12 hours  metoprolol tartrate 25 milliGRAM(s) Enteral Tube two times a day  piperacillin/tazobactam IVPB.. 3.375 Gram(s) IV Intermittent every 8 hours  silver sulfADIAZINE 1% Cream 1 Application(s) Topical daily    MEDICATIONS  (PRN):  sodium chloride 0.65% Nasal 1 Spray(s) Both Nostrils four times a day PRN Nasal Congestion      LABS:                        9.5    8.22  )-----------( 172      ( 2025 04:10 )             28.4     07-    136  |  99  |  10  ----------------------------<  118[H]  3.4[L]   |  25  |  0.77    Ca    8.4      2025 04:10  Phos  2.6     07-07  Mg     2.00     07-07        Urinalysis Basic - ( 2025 04:10 )    Color: x / Appearance: x / SG: x / pH: x  Gluc: 118 mg/dL / Ketone: x  / Bili: x / Urobili: x   Blood: x / Protein: x / Nitrite: x   Leuk Esterase: x / RBC: x / WBC x   Sq Epi: x / Non Sq Epi: x / Bacteria: x        RADIOLOGY & ADDITIONAL STUDIES:  A TEAM SURGERY DAILY PROGRESS NOTE      Vital Signs Last 24 Hrs  T(C): 36.9 (2025 09:40), Max: 37.1 (2025 13:47)  T(F): 98.5 (2025 09:40), Max: 98.8 (2025 13:47)  HR: 88 (:40) (74 - 97)  BP: 113/68 (:40) (103/81 - 133/74)  BP(mean): --  RR: 18 (:40) (17 - 18)  SpO2: 98% (:40) (96% - 99%)    Parameters below as of :40  Patient On (Oxygen Delivery Method): nasal cannula  O2 Flow (L/min): 2        SUBJECTIVE: Pt seen and examined at bedside on AM rounds. Patient reports feeling well, pain controlled on current regimen. Tolerating tube feeds, denies nausea, vomiting.  Denies fever, chills.    General: NAD, resting in bed comfortably.  Cardiac: regular rate, warm and well perfused  Respiratory: Even and unlabored breathing, on room air  Abdomen: soft, nontender, nondistended. PEG in place  Extremities: normal strength, FROM, no gross deformities    I&O's Summary    2025 07:  -  2025 07:00  --------------------------------------------------------  IN: 2230 mL / OUT: 760 mL / NET: 1470 mL    2025 07:  -  2025 12:18  --------------------------------------------------------  IN: 0 mL / OUT: 0 mL / NET: 0 mL      I&O's Detail    2025 07:  -  2025 07:00  --------------------------------------------------------  IN:    Enteral Tube Flush: 180 mL    IV PiggyBack: 750 mL    Miscellaneous Tube Feedin mL  Total IN: 2230 mL    OUT:    Bulb (mL): 10 mL    Oral Fluid: 0 mL    Voided (mL): 750 mL  Total OUT: 760 mL    Total NET: 1470 mL      2025 07:01  -  2025 12:18  --------------------------------------------------------  IN:  Total IN: 0 mL    OUT:    Oral Fluid: 0 mL  Total OUT: 0 mL    Total NET: 0 mL          MEDICATIONS  (STANDING):  enoxaparin Injectable 40 milliGRAM(s) SubCutaneous every 24 hours  methadone   Solution 1 milliGRAM(s) Enteral Tube every 12 hours  metoprolol tartrate 25 milliGRAM(s) Enteral Tube two times a day  piperacillin/tazobactam IVPB.. 3.375 Gram(s) IV Intermittent every 8 hours  silver sulfADIAZINE 1% Cream 1 Application(s) Topical daily    MEDICATIONS  (PRN):  sodium chloride 0.65% Nasal 1 Spray(s) Both Nostrils four times a day PRN Nasal Congestion      LABS:                        9.5    8.22  )-----------( 172      ( 2025 04:10 )             28.4     07-07    136  |  99  |  10  ----------------------------<  118[H]  3.4[L]   |  25  |  0.77    Ca    8.4      2025 04:10  Phos  2.6     07-07  Mg     2.00     07-07        Urinalysis Basic - ( 2025 04:10 )    Color: x / Appearance: x / SG: x / pH: x  Gluc: 118 mg/dL / Ketone: x  / Bili: x / Urobili: x   Blood: x / Protein: x / Nitrite: x   Leuk Esterase: x / RBC: x / WBC x   Sq Epi: x / Non Sq Epi: x / Bacteria: x        RADIOLOGY & ADDITIONAL STUDIES:

## 2025-07-08 NOTE — PROGRESS NOTE ADULT - ASSESSMENT
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 Had SVT  managed with vagal maneuvres. Now on the floor for diverticulitis management, slightly worsening repeat CT scan of diverticulitis with small abscess, now s/p IR drainage of pericolonic abscess 07/03.    Plan:  - Cardiology recs       - started on low dose metroprolol to help maintain sinus       - TTE EF 56%  - Hold off AC  - Minimal drain output, continue with 5cc flushes QD: Outpatient CT and IR tube check scheduled for 7/17  - drain growing strep anginosus, ID following appreciate recs: Change to Augmentin 875/125 mg BID until 7/12 upon d/c   - NPO/PEG with bolus tube feeds  - Follow up BMP  - Plan for d/c home today     Nikole Fay PA-C  [facility] Surgery   #[id number]

## 2025-07-08 NOTE — PROGRESS NOTE ADULT - PROBLEM SELECTOR PLAN 5
outpatient fasting lipid panel once infective issues resolved
outpatient fasting lipid panel once infective issues resolved
outpatient follow up ENT and oncology
outpatient fasting lipid panel once infective issues resolved
outpatient fasting lipid panel once infective issues resolved
outpatient follow up ENT and oncology

## 2025-07-08 NOTE — PROGRESS NOTE ADULT - SUBJECTIVE AND OBJECTIVE BOX
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 He reports the symptoms began on Tuesday while at chemotherapy with crampy abdominal pain associated with nausea. He has been otherwise tolerating PEG tube feeds. Denies fevers at home. No diarrhea. Never had similar episodes. Had a colonoscopy 5 years ago which showed benign polyps.   In the ED, patient was noted to be febrile to 100.7 and in SVT with HR in 170s. Given beta blockers, antibiotics and IVF resuscitation with improvement (28 Jun 2025 05:04)  No prior history of palpitations or fainting. A 10 pt ROS is otherwise negative.  Date of service 7/8- EKG with AFib identified in the chart (not yet uploaded to [facility])    PAST MEDICAL & SURGICAL HISTORY:  BPH with elevated PSA  Osteoarthritis  Hyperlipidemia  Anxiety  Cancer of sinus  Malignant neoplasm of nasopharynx  History of chemotherapy  S/P radiation therapy  History of knee replacement procedure of right knee  S/P total knee replacement, left  S/P cataract surgery  H/O colonoscopy  H/O cervical biopsy  History of incisional hernia repair    enoxaparin Injectable 40 milliGRAM(s) SubCutaneous every 24 hours  methadone   Solution 1 milliGRAM(s) Enteral Tube every 12 hours  metoprolol tartrate 25 milliGRAM(s) Enteral Tube two times a day  piperacillin/tazobactam IVPB.. 3.375 Gram(s) IV Intermittent every 8 hours  silver sulfADIAZINE 1% Cream 1 Application(s) Topical daily  sodium chloride 0.65% Nasal 1 Spray(s) Both Nostrils four times a day PRN                            9.5    8.22  )-----------( 172      ( 07 Jul 2025 04:10 )             28.4       07-08    132[L]  |  96[L]  |  12  ----------------------------<  109[H]  3.8   |  25  |  0.79    Ca    8.9      08 Jul 2025 12:34  Phos  2.6     07-07  Mg     2.00     07-07    T(C): 36.9 (07-08-25 @ 09:40), Max: 36.9 (07-07-25 @ 22:08)  HR: 88 (07-08-25 @ 09:40) (74 - 97)  BP: 113/68 (07-08-25 @ 09:40) (103/81 - 133/74)  RR: 18 (07-08-25 @ 09:40) (17 - 18)  SpO2: 98% (07-08-25 @ 09:40) (96% - 99%)  Wt(kg): --    I&O's Summary    07 Jul 2025 07:01  -  08 Jul 2025 07:00  --------------------------------------------------------  IN: 2230 mL / OUT: 760 mL / NET: 1470 mL    08 Jul 2025 07:01  -  08 Jul 2025 14:41  --------------------------------------------------------  IN: 385 mL / OUT: 2.5 mL / NET: 382.5 mL    Appearance: Normal appearing adult man in no acute distress, prior nasopharyngeal CA.	  HEENT:   Normal oral mucosa, PERRL, EOMI	  Lymphatic: No lymphadenopathy , no edema  Cardiovascular: Normal S1 S2, No JVD, No murmurs , Peripheral pulses palpable 2+ bilaterally  Respiratory: Lungs clear to auscultation, normal effort 	  Gastrointestinal:  Soft, Non-tender, + BS	  Skin: No rashes, No ecchymoses, No cyanosis, warm to touch  Musculoskeletal: Normal range of motion, normal strength  Psychiatry:  Mood & affect appropriate    TELEMETRY: sinus, APCs, runs of PAT. 	    ECG: atrial tachycardia 165bpm. 	 AFib identified.  	  ASSESSMENT/PLAN: Mr Bernal is a 75y Male admitted with facial pain.  EP called re: SVT in the emergency department.  EKG c/w an atrial tachycardia.  Some visible P-waves with fluctuation of the heartrate.    Another EKG identified with sustained AFIb.  [id number] is 1 for age.  Does not require anticoagulation.  Ongoing EKG telemetry upstairs with short bursts of PAT.  Recommend to regular electrolytes, volume status, treat fever, and use beta blockers to suppress arrhythmia.  (Continue metoprolol via enteral tube).  At this time, would avoid ablation, and try medical management first.  Will follow with you.      Mik Iverson M.D.  Cardiac Electrophysiology    office [phone]  pager [phone]

## 2025-07-08 NOTE — DISCHARGE NOTE NURSING/CASE MANAGEMENT/SOCIAL WORK - NSDCPNDISPN_GEN_ALL_CORE
Education provided on the pain management plan of care/Side effects of pain management treatment/Activities of daily living, including home environment that might     exacerbate pain or reduce effectiveness of the pain management plan of care as well as strategies to address these issues/Safe use, storage and disposal of opioids when prescribed
For information on Fall & injury Prevention, visit [email or web address]

## 2025-07-08 NOTE — PROGRESS NOTE ADULT - REASON FOR ADMISSION
diverticulitis

## 2025-07-08 NOTE — PROGRESS NOTE ADULT - PROBLEM SELECTOR PLAN 1
secondary to diverticulitis and abscess  s/p RRT yesterday  now on piperacillin/tazobactam   ID follow up appreciated  awaiting IR drainage
secondary to diverticulitis and abscess  resolved  continue antibiotics per ID recommendations  now on piperacillin/tazobactam
resolved  continue antibiotics per ID recommendations  continue piperacillin/tazobactam
secondary to diverticulitis and abscess  aspirate positive for coccobacilli  ID follow up
resolved  continue antibiotics per ID recommendations  follow up appreciated
resolved  continue antibiotics per ID recommendations  continue piperacillin/tazobactam

## 2025-07-08 NOTE — DISCHARGE NOTE NURSING/CASE MANAGEMENT/SOCIAL WORK - NSFLUVACAGEDISCH_IMM_ALL_CORE
Adult Carac Counseling:  I discussed with the patient the risks of Carac including but not limited to erythema, scaling, itching, weeping, crusting, and pain.

## 2025-07-08 NOTE — CHART NOTE - NSCHARTNOTEFT_GEN_A_CORE
NUTRITION FOLLOW UP NOTE     REASON FOR ASSESSMENT: follow-up enteral nutrition.    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     DIET PRESCRIPTION:  Diet, NPO:   With Ice Chips/Sips of Water  Tube Feeding Modality: Gastrostomy  Interactive [employer] standard 1.4  levon  Total Volume for 24 Hours (mL): 1300  Bolus  Total # of Feeds: 3  Tube Feed Frequency: Every 6 hours   Tube Feed Start Time: 08:00  Bolus Feed Rate (mL per Hour): 325  Tube Feed Start Time: 09:05   Bolus Feed Duration (in Hours): 1   Total Volume of Bolus (mL):  325 (25 @ 09:02)      NUTRITION COURSE Met with patient at bedside. Patient currently on RegainGo Standard 1.4cal bolus feeds of 325mL [id number] provides total volume of 1300mL, 1820kcal, 81g pro and 923mL free water (meeting ~28kcal/kg and 1.2g pro/kg of IBW-65kg). Denies any nausea/vomiting/diarrhea/constipation reported. Patient is tolerating tube feeds well. No questions or concerns voiced at this time.          PERTINENT MEDICATIONS:  MEDICATIONS  (STANDING):  enoxaparin Injectable 40 milliGRAM(s) SubCutaneous every 24 hours  methadone   Solution 1 milliGRAM(s) Enteral Tube every 12 hours  metoprolol tartrate 25 milliGRAM(s) Enteral Tube two times a day  piperacillin/tazobactam IVPB.. 3.375 Gram(s) IV Intermittent every 8 hours  silver sulfADIAZINE 1% Cream 1 Application(s) Topical daily    MEDICATIONS  (PRN):  sodium chloride 0.65% Nasal 1 Spray(s) Both Nostrils four times a day PRN Nasal Congestion    PERTINENT LABS:   Na136 mmol/L Glu 118 mg/dL[H] K+ 3.4 mmol/L[L] Cr  0.77 mg/dL BUN 10 mg/dL - Phos 2.6 mg/dL - Alb 2.9 g/dL[L]       ANTHROPOMETRICS:  Weight: Height (cm): 163 (06-27 @ 21:52)  Weight (kg): 74.9kg (-29)  75.8 (-28 @ 06:55)  BMI (kg/m2): 28.2   Weight Assessment: relatively stable  [X] spoke to RN- request new weekly weight   IBW: 130lbs (+10%)    PHYSICAL ASSESSMENT, per flowsheets:  Edema: 2+ right neck edema  Pressure Injury: No pressure ulcers/DTI noted in flowsheets.     NUTRITION FOCUSED PHYSICAL EXAM  [ X] NFPE consistent with previous RD assessment (date _25__ )       ESTIMATED NEEDS:    [ X] recalculated, based on weight    PREVIOUS NUTRITION DX:   [X ] Increased Nutrient Needs  Nutrition Diagnosis is [X ] ongoing  [ ] resolved [ ] not applicable   New Nutrition Diagnosis: [X ] not applicable     EDUCATION:    [X ] Reinforced bolus tube feeds regimen.      RECOMMENDATIONS/INTERVENTIONS:  1) Continue current TF regimen as tolerated.  2) Obtain new weekly weight.      MONITORING & EVALUATIN) Monitor weights, labs, BM's, skin integrity, tolerance to EN.  2) Further adjustments to rate/volume/duration/free water provision of enteral feeds dependant on long term monitoring of patient's tolerance, weight trends and needs.      Dell Dutta, FLOYD, MS, RDN | Available on [facility] TEAMS | Pager# [phone]

## 2025-07-08 NOTE — PROGRESS NOTE ADULT - PROBLEM SELECTOR PLAN 3
asymptomatic  continue Finasteride
transient  now resolved  continue to monitor
asymptomatic  continue Finasteride

## 2025-07-08 NOTE — PROGRESS NOTE ADULT - PROBLEM SELECTOR PLAN 6
outpatient follow up ENT and oncology

## 2025-07-09 ENCOUNTER — TRANSCRIPTION ENCOUNTER (OUTPATIENT)
Age: 75
End: 2025-07-09

## 2025-07-09 ENCOUNTER — APPOINTMENT (OUTPATIENT)
Dept: OTOLARYNGOLOGY | Facility: CLINIC | Age: 75
End: 2025-07-09
Payer: MEDICARE

## 2025-07-09 VITALS
OXYGEN SATURATION: 96 % | DIASTOLIC BLOOD PRESSURE: 84 MMHG | HEART RATE: 88 BPM | SYSTOLIC BLOOD PRESSURE: 131 MMHG | HEIGHT: 66 IN | WEIGHT: 162 LBS | BODY MASS INDEX: 26.03 KG/M2

## 2025-07-09 PROCEDURE — 99215 OFFICE O/P EST HI 40 MIN: CPT

## 2025-07-09 RX ORDER — POLYVINYL ALCOHOL, POVIDONE .5; .6 G/100ML; G/100ML
0.5-0.6 LIQUID OPHTHALMIC
Qty: 1 | Refills: 0 | Status: ACTIVE | COMMUNITY
Start: 2025-07-09 | End: 1900-01-01

## 2025-07-09 RX ORDER — CARBOXYMETHYLCELLULOSE SODIUM 5 MG/ML
0.5 SOLUTION/ DROPS OPHTHALMIC
Qty: 1 | Refills: 2 | Status: ACTIVE | COMMUNITY
Start: 2025-07-09 | End: 1900-01-01

## 2025-07-11 ENCOUNTER — NON-APPOINTMENT (OUTPATIENT)
Age: 75
End: 2025-07-11

## 2025-07-15 ENCOUNTER — RESULT REVIEW (OUTPATIENT)
Age: 75
End: 2025-07-15

## 2025-07-15 ENCOUNTER — APPOINTMENT (OUTPATIENT)
Dept: HEMATOLOGY ONCOLOGY | Facility: CLINIC | Age: 75
End: 2025-07-15
Payer: MEDICARE

## 2025-07-15 ENCOUNTER — APPOINTMENT (OUTPATIENT)
Dept: INFUSION THERAPY | Facility: HOSPITAL | Age: 75
End: 2025-07-15

## 2025-07-15 PROBLEM — R22.2: Status: ACTIVE | Noted: 2025-01-01

## 2025-07-15 PROBLEM — H04.129 DRY EYE: Status: ACTIVE | Noted: 2025-01-01

## 2025-07-15 PROCEDURE — 99214 OFFICE O/P EST MOD 30 MIN: CPT

## 2025-07-15 RX ORDER — ARTIFICIAL TEARS 1; 2; 3 MG/ML; MG/ML; MG/ML
SOLUTION/ DROPS OPHTHALMIC 4 TIMES DAILY
Qty: 30 | Refills: 5 | Status: ACTIVE | COMMUNITY
Start: 2025-07-15 | End: 1900-01-01

## 2025-07-16 ENCOUNTER — NON-APPOINTMENT (OUTPATIENT)
Age: 75
End: 2025-07-16

## 2025-07-17 ENCOUNTER — APPOINTMENT (OUTPATIENT)
Dept: CT IMAGING | Facility: HOSPITAL | Age: 75
End: 2025-07-17

## 2025-07-17 ENCOUNTER — RESULT REVIEW (OUTPATIENT)
Age: 75
End: 2025-07-17

## 2025-07-22 PROBLEM — I87.8 POOR VENOUS ACCESS: Status: ACTIVE | Noted: 2025-01-01

## 2025-07-29 PROBLEM — R22.1 MASS OF RIGHT SIDE OF NECK: Status: ACTIVE | Noted: 2025-01-01

## 2025-07-29 PROBLEM — R53.82 CHRONIC FATIGUE: Status: ACTIVE | Noted: 2025-01-01

## 2025-08-07 PROBLEM — C31.9 MALIGNANT NEOPLASM OF ACCESSORY SINUS, UNSPECIFIED: Chronic | Status: INACTIVE | Noted: 2024-01-01 | Resolved: 2025-01-01

## 2025-08-10 LAB
CULTURE RESULTS: SIGNIFICANT CHANGE UP
CULTURE RESULTS: SIGNIFICANT CHANGE UP
SPECIMEN SOURCE: SIGNIFICANT CHANGE UP
SPECIMEN SOURCE: SIGNIFICANT CHANGE UP

## 2025-08-12 ENCOUNTER — APPOINTMENT (OUTPATIENT)
Dept: HEMATOLOGY ONCOLOGY | Facility: CLINIC | Age: 75
End: 2025-08-12

## 2025-08-12 ENCOUNTER — APPOINTMENT (OUTPATIENT)
Dept: INFUSION THERAPY | Facility: HOSPITAL | Age: 75
End: 2025-08-12

## 2025-08-19 ENCOUNTER — APPOINTMENT (OUTPATIENT)
Dept: OTOLARYNGOLOGY | Facility: CLINIC | Age: 75
End: 2025-08-19

## 2025-08-19 ENCOUNTER — APPOINTMENT (OUTPATIENT)
Dept: HEMATOLOGY ONCOLOGY | Facility: CLINIC | Age: 75
End: 2025-08-19

## 2025-08-19 ENCOUNTER — APPOINTMENT (OUTPATIENT)
Dept: INFUSION THERAPY | Facility: HOSPITAL | Age: 75
End: 2025-08-19

## 2025-08-26 ENCOUNTER — APPOINTMENT (OUTPATIENT)
Dept: INFUSION THERAPY | Facility: HOSPITAL | Age: 75
End: 2025-08-26

## 2025-08-26 ENCOUNTER — APPOINTMENT (OUTPATIENT)
Dept: HEMATOLOGY ONCOLOGY | Facility: CLINIC | Age: 75
End: 2025-08-26

## 2025-08-26 ENCOUNTER — APPOINTMENT (OUTPATIENT)
Dept: OTOLARYNGOLOGY | Facility: CLINIC | Age: 75
End: 2025-08-26

## 2025-09-02 ENCOUNTER — APPOINTMENT (OUTPATIENT)
Dept: HEMATOLOGY ONCOLOGY | Facility: CLINIC | Age: 75
End: 2025-09-02

## 2025-09-02 ENCOUNTER — APPOINTMENT (OUTPATIENT)
Dept: INFUSION THERAPY | Facility: HOSPITAL | Age: 75
End: 2025-09-02

## (undated) DEVICE — DRSG 2X2

## (undated) DEVICE — DRAPE MAGNETIC INSTRUMENT MEDIUM

## (undated) DEVICE — BASIN EMESIS 10IN GRADUATED MAUVE

## (undated) DEVICE — DENTURE CUP PINK

## (undated) DEVICE — DRAPE SPLIT SHEET 77" X 120"

## (undated) DEVICE — NDL HYPO SAFE 25G X 5/8" (ORANGE)

## (undated) DEVICE — TUBING IV SET GRAVITY 3Y 100" MACRO

## (undated) DEVICE — SUT SILK 3-0 18" TIES

## (undated) DEVICE — DRSG TELFA 3 X 8

## (undated) DEVICE — SYR LUER LOK 5CC

## (undated) DEVICE — CATH IV SAFE BC 22G X 1" (BLUE)

## (undated) DEVICE — PACK IV START WITH CHG

## (undated) DEVICE — ELCTR BOVIE PENCIL SMOKE EVACUATION

## (undated) DEVICE — SUT CHROMIC 3-0 27" RB-1

## (undated) DEVICE — DRAPE TOWEL BLUE 17" X 24"

## (undated) DEVICE — UNDERPAD LINEN SAVER 17 X 24"

## (undated) DEVICE — BIOPSY FORCEP COLD DISP

## (undated) DEVICE — DRAINAGE BAG URINARY 2L

## (undated) DEVICE — NDL HYPO SAFE 22G X 1" (BLACK)

## (undated) DEVICE — BIOPSY FORCEP RADIAL JAW 4 STANDARD WITH NEEDLE

## (undated) DEVICE — TUBING MEDI-VAC W MAXIGRIP CONNECTORS 1/4"X6'

## (undated) DEVICE — ELCTR ECG CONDUCTIVE ADHESIVE

## (undated) DEVICE — DRAPE INSTRUMENT POUCH 6.75" X 11"

## (undated) DEVICE — DRSG CURITY GAUZE SPONGE 4 X 4" 12-PLY NON-STERILE

## (undated) DEVICE — SUT SILK 2-0 18" TIES

## (undated) DEVICE — WARMING BLANKET LOWER ADULT

## (undated) DEVICE — CLAMP BX HOT RAD JAW 3

## (undated) DEVICE — PROTECTOR HEEL / ELBOW FLUFFY

## (undated) DEVICE — STAPLER SKIN VISI-STAT 35 WIDE

## (undated) DEVICE — PACK HEAD & NECK

## (undated) DEVICE — SALIVA EJECTOR (BLUE)

## (undated) DEVICE — CONTAINER FORMALIN 80ML YELLOW

## (undated) DEVICE — GOWN LG

## (undated) DEVICE — NERVE STIMULATOR/LOCATOR HEAD AND NECK MODEL

## (undated) DEVICE — GLV 7 PROTEXIS (WHITE)

## (undated) DEVICE — LUBRICATING JELLY HR ONE SHOT 3G

## (undated) DEVICE — CANISTER DISPOSABLE THIN WALL 3000CC

## (undated) DEVICE — LABELS BLANK W PEN

## (undated) DEVICE — VAGINAL PACKING 2"

## (undated) DEVICE — SOL IRR POUR H2O 500ML

## (undated) DEVICE — VENODYNE/SCD SLEEVE CALF MEDIUM

## (undated) DEVICE — ELCTR BOVIE TIP BLADE INSULATED 2.8" EDGE WITH SAFETY

## (undated) DEVICE — BITE BLOCK ADULT 20 X 27MM (GREEN)

## (undated) DEVICE — SYR LUER LOK 1CC

## (undated) DEVICE — DRSG BANDAID 0.75X3"

## (undated) DEVICE — DRAPE 3/4 SHEET 52X76"

## (undated) DEVICE — DRAPE FLUID WARMER 44 X 44"